# Patient Record
Sex: MALE | Race: ASIAN | NOT HISPANIC OR LATINO | Employment: FULL TIME | URBAN - METROPOLITAN AREA
[De-identification: names, ages, dates, MRNs, and addresses within clinical notes are randomized per-mention and may not be internally consistent; named-entity substitution may affect disease eponyms.]

---

## 2017-03-17 ENCOUNTER — ALLSCRIPTS OFFICE VISIT (OUTPATIENT)
Dept: OTHER | Facility: OTHER | Age: 52
End: 2017-03-17

## 2017-03-17 ENCOUNTER — TRANSCRIBE ORDERS (OUTPATIENT)
Dept: ADMINISTRATIVE | Facility: HOSPITAL | Age: 52
End: 2017-03-17

## 2017-03-17 DIAGNOSIS — K50.919 CROHN'S DISEASE WITH COMPLICATION, UNSPECIFIED GASTROINTESTINAL TRACT LOCATION (HCC): Primary | ICD-10-CM

## 2017-03-17 DIAGNOSIS — R10.13 DYSPEPSIA: ICD-10-CM

## 2017-03-17 DIAGNOSIS — K85.90 ACUTE PANCREATITIS WITHOUT INFECTION OR NECROSIS: ICD-10-CM

## 2017-03-17 DIAGNOSIS — R10.13 ABDOMINAL PAIN, EPIGASTRIC: ICD-10-CM

## 2017-03-17 DIAGNOSIS — R10.13 EPIGASTRIC PAIN: ICD-10-CM

## 2017-03-18 ENCOUNTER — APPOINTMENT (OUTPATIENT)
Dept: LAB | Facility: HOSPITAL | Age: 52
End: 2017-03-18
Attending: INTERNAL MEDICINE
Payer: COMMERCIAL

## 2017-03-18 DIAGNOSIS — K50.919 CROHN'S DISEASE WITH COMPLICATION, UNSPECIFIED GASTROINTESTINAL TRACT LOCATION (HCC): ICD-10-CM

## 2017-03-18 DIAGNOSIS — R10.13 ABDOMINAL PAIN, EPIGASTRIC: ICD-10-CM

## 2017-03-18 DIAGNOSIS — K85.90 ACUTE PANCREATITIS WITHOUT INFECTION OR NECROSIS: ICD-10-CM

## 2017-03-18 DIAGNOSIS — R10.13 EPIGASTRIC PAIN: ICD-10-CM

## 2017-03-18 LAB
ALBUMIN SERPL BCP-MCNC: 4.2 G/DL (ref 3.5–5)
ALP SERPL-CCNC: 83 U/L (ref 46–116)
ALT SERPL W P-5'-P-CCNC: 40 U/L (ref 12–78)
ANION GAP SERPL CALCULATED.3IONS-SCNC: 7 MMOL/L (ref 4–13)
AST SERPL W P-5'-P-CCNC: 19 U/L (ref 5–45)
BILIRUB SERPL-MCNC: 0.45 MG/DL (ref 0.2–1)
BUN SERPL-MCNC: 9 MG/DL (ref 5–25)
CALCIUM SERPL-MCNC: 9.5 MG/DL (ref 8.3–10.1)
CHLORIDE SERPL-SCNC: 97 MMOL/L (ref 100–108)
CO2 SERPL-SCNC: 29 MMOL/L (ref 21–32)
CREAT SERPL-MCNC: 0.91 MG/DL (ref 0.6–1.3)
GFR SERPL CREATININE-BSD FRML MDRD: >60 ML/MIN/1.73SQ M
GLUCOSE P FAST SERPL-MCNC: 138 MG/DL (ref 65–99)
IGA SERPL-MCNC: 186 MG/DL (ref 70–400)
LIPASE SERPL-CCNC: 68 U/L (ref 73–393)
POTASSIUM SERPL-SCNC: 4.5 MMOL/L (ref 3.5–5.3)
PROT SERPL-MCNC: 8 G/DL (ref 6.4–8.2)
SODIUM SERPL-SCNC: 133 MMOL/L (ref 136–145)
TRIGL SERPL-MCNC: 258 MG/DL

## 2017-03-18 PROCEDURE — 82787 IGG 1 2 3 OR 4 EACH: CPT

## 2017-03-18 PROCEDURE — 83516 IMMUNOASSAY NONANTIBODY: CPT

## 2017-03-18 PROCEDURE — 84478 ASSAY OF TRIGLYCERIDES: CPT

## 2017-03-18 PROCEDURE — 82784 ASSAY IGA/IGD/IGG/IGM EACH: CPT

## 2017-03-18 PROCEDURE — 80053 COMPREHEN METABOLIC PANEL: CPT

## 2017-03-18 PROCEDURE — 36415 COLL VENOUS BLD VENIPUNCTURE: CPT

## 2017-03-18 PROCEDURE — 83690 ASSAY OF LIPASE: CPT

## 2017-03-20 LAB
IGG SERPL-MCNC: 1023 MG/DL (ref 700–1600)
IGG1 SER-MCNC: 454 MG/DL (ref 422–1292)
IGG2 SER-MCNC: 449 MG/DL (ref 117–747)
IGG3 SER-MCNC: 62 MG/DL (ref 41–129)
IGG4 SER-MCNC: 315 MG/DL (ref 1–291)
TTG IGA SER-ACNC: <2 U/ML (ref 0–3)

## 2017-03-30 ENCOUNTER — HOSPITAL ENCOUNTER (OUTPATIENT)
Dept: RADIOLOGY | Facility: HOSPITAL | Age: 52
Discharge: HOME | End: 2017-03-30
Attending: INTERNAL MEDICINE
Payer: COMMERCIAL

## 2017-03-30 DIAGNOSIS — R10.13 DYSPEPSIA: ICD-10-CM

## 2017-03-30 PROCEDURE — A9585 GADOBUTROL INJECTION: HCPCS | Performed by: INTERNAL MEDICINE

## 2017-03-30 PROCEDURE — 74183 MRI ABD W/O CNTR FLWD CNTR: CPT

## 2017-03-30 RX ADMIN — GADOBUTROL 7 ML: 604.72 INJECTION INTRAVENOUS at 15:58

## 2017-04-06 ENCOUNTER — GENERIC CONVERSION - ENCOUNTER (OUTPATIENT)
Dept: OTHER | Facility: OTHER | Age: 52
End: 2017-04-06

## 2017-05-25 RX ORDER — SIMVASTATIN 20 MG
20 TABLET ORAL
COMMUNITY
End: 2021-03-19

## 2017-05-25 RX ORDER — FOLIC ACID 1 MG/1
1 TABLET ORAL DAILY
COMMUNITY

## 2017-05-25 RX ORDER — MULTIVIT WITH MINERALS/LUTEIN
1000 TABLET ORAL DAILY
COMMUNITY

## 2017-05-25 RX ORDER — DIPHENOXYLATE HYDROCHLORIDE AND ATROPINE SULFATE 2.5; .025 MG/1; MG/1
1 TABLET ORAL DAILY
COMMUNITY

## 2017-05-25 RX ORDER — BRIMONIDINE TARTRATE 2 MG/ML
3 SOLUTION/ DROPS OPHTHALMIC DAILY
COMMUNITY
End: 2020-12-28

## 2017-05-25 RX ORDER — GLIMEPIRIDE 1 MG/1
2 TABLET ORAL DAILY
COMMUNITY
End: 2020-07-09

## 2017-05-25 RX ORDER — LATANOPROST 50 UG/ML
1 SOLUTION/ DROPS OPHTHALMIC
COMMUNITY

## 2017-05-25 RX ORDER — FAMOTIDINE 20 MG
1 TABLET ORAL 2 TIMES DAILY
COMMUNITY

## 2017-05-25 RX ORDER — LISINOPRIL 10 MG/1
10 TABLET ORAL DAILY
COMMUNITY
End: 2020-12-15 | Stop reason: ALTCHOICE

## 2017-05-26 ENCOUNTER — HOSPITAL ENCOUNTER (OUTPATIENT)
Facility: HOSPITAL | Age: 52
Setting detail: OUTPATIENT SURGERY
Discharge: HOME | End: 2017-05-26
Attending: INTERNAL MEDICINE | Admitting: INTERNAL MEDICINE
Payer: COMMERCIAL

## 2017-05-26 ENCOUNTER — TRANSCRIBE ORDERS (OUTPATIENT)
Dept: LAB | Facility: HOSPITAL | Age: 52
End: 2017-05-26

## 2017-05-26 ENCOUNTER — ANESTHESIA (OUTPATIENT)
Dept: GASTROENTEROLOGY | Facility: HOSPITAL | Age: 52
End: 2017-05-26
Payer: COMMERCIAL

## 2017-05-26 ENCOUNTER — ANESTHESIA EVENT (OUTPATIENT)
Dept: GASTROENTEROLOGY | Facility: HOSPITAL | Age: 52
End: 2017-05-26
Payer: COMMERCIAL

## 2017-05-26 ENCOUNTER — GENERIC CONVERSION - ENCOUNTER (OUTPATIENT)
Dept: OTHER | Facility: OTHER | Age: 52
End: 2017-05-26

## 2017-05-26 VITALS
BODY MASS INDEX: 24.34 KG/M2 | HEIGHT: 70 IN | TEMPERATURE: 98.4 F | WEIGHT: 170 LBS | SYSTOLIC BLOOD PRESSURE: 118 MMHG | DIASTOLIC BLOOD PRESSURE: 71 MMHG | OXYGEN SATURATION: 98 % | RESPIRATION RATE: 20 BRPM | HEART RATE: 82 BPM

## 2017-05-26 DIAGNOSIS — Z12.11 ENCOUNTER FOR SCREENING FOR MALIGNANT NEOPLASM OF COLON: ICD-10-CM

## 2017-05-26 DIAGNOSIS — K29.80 DUODENITIS WITHOUT BLEEDING: ICD-10-CM

## 2017-05-26 DIAGNOSIS — R10.13 EPIGASTRIC PAIN: ICD-10-CM

## 2017-05-26 LAB — GLUCOSE SERPL-MCNC: 159 MG/DL (ref 65–140)

## 2017-05-26 PROCEDURE — 88305 TISSUE EXAM BY PATHOLOGIST: CPT | Performed by: INTERNAL MEDICINE

## 2017-05-26 PROCEDURE — 88342 IMHCHEM/IMCYTCHM 1ST ANTB: CPT | Performed by: INTERNAL MEDICINE

## 2017-05-26 PROCEDURE — 82948 REAGENT STRIP/BLOOD GLUCOSE: CPT

## 2017-05-26 RX ORDER — SODIUM CHLORIDE 9 MG/ML
100 INJECTION, SOLUTION INTRAVENOUS CONTINUOUS
Status: DISCONTINUED | OUTPATIENT
Start: 2017-05-26 | End: 2017-05-26 | Stop reason: HOSPADM

## 2017-05-26 RX ORDER — PROPOFOL 10 MG/ML
INJECTION, EMULSION INTRAVENOUS AS NEEDED
Status: DISCONTINUED | OUTPATIENT
Start: 2017-05-26 | End: 2017-05-26 | Stop reason: SURG

## 2017-05-26 RX ADMIN — SODIUM CHLORIDE 100 ML/HR: 0.9 INJECTION, SOLUTION INTRAVENOUS at 09:27

## 2017-05-26 RX ADMIN — PROPOFOL 50 MG: 10 INJECTION, EMULSION INTRAVENOUS at 10:15

## 2017-05-26 RX ADMIN — PROPOFOL 50 MG: 10 INJECTION, EMULSION INTRAVENOUS at 10:10

## 2017-05-26 RX ADMIN — PROPOFOL 150 MG: 10 INJECTION, EMULSION INTRAVENOUS at 09:44

## 2017-05-26 RX ADMIN — PROPOFOL 50 MG: 10 INJECTION, EMULSION INTRAVENOUS at 09:55

## 2017-05-26 RX ADMIN — PROPOFOL 50 MG: 10 INJECTION, EMULSION INTRAVENOUS at 09:50

## 2017-05-26 RX ADMIN — PROPOFOL 50 MG: 10 INJECTION, EMULSION INTRAVENOUS at 10:20

## 2017-05-30 ENCOUNTER — APPOINTMENT (OUTPATIENT)
Dept: LAB | Facility: HOSPITAL | Age: 52
End: 2017-05-30
Attending: INTERNAL MEDICINE
Payer: COMMERCIAL

## 2017-05-30 ENCOUNTER — TRANSCRIBE ORDERS (OUTPATIENT)
Dept: LAB | Facility: HOSPITAL | Age: 52
End: 2017-05-30

## 2017-05-30 DIAGNOSIS — K29.80 DUODENITIS WITHOUT BLEEDING: ICD-10-CM

## 2017-05-30 PROCEDURE — 36415 COLL VENOUS BLD VENIPUNCTURE: CPT

## 2017-05-30 PROCEDURE — 82941 ASSAY OF GASTRIN: CPT

## 2017-06-01 LAB — GASTRIN SERPL-MCNC: 27 PG/ML (ref 0–115)

## 2017-07-21 ENCOUNTER — ALLSCRIPTS OFFICE VISIT (OUTPATIENT)
Dept: OTHER | Facility: OTHER | Age: 52
End: 2017-07-21

## 2018-01-15 VITALS
HEART RATE: 86 BPM | DIASTOLIC BLOOD PRESSURE: 74 MMHG | SYSTOLIC BLOOD PRESSURE: 132 MMHG | WEIGHT: 170 LBS | OXYGEN SATURATION: 97 % | TEMPERATURE: 97.2 F

## 2018-01-15 VITALS
HEART RATE: 96 BPM | TEMPERATURE: 96.2 F | WEIGHT: 168 LBS | OXYGEN SATURATION: 100 % | BODY MASS INDEX: 24.05 KG/M2 | SYSTOLIC BLOOD PRESSURE: 116 MMHG | DIASTOLIC BLOOD PRESSURE: 72 MMHG | HEIGHT: 70 IN

## 2018-01-15 NOTE — RESULT NOTES
Verified Results  * MRI ABDOMEN W WO CONTRAST AND MRCP [id number] 02:31PM Bakari Ford     Test Name Result Flag Reference   MRI ABDOMEN W WO CONTRAST AND MRCP (Report)     This is a summary report  The complete report is available in the patient's medical record  If you cannot access the medical record, please contact the sending organization for a detailed fax or copy  MRI OF THE ABDOMEN WITH AND WITHOUT CONTRAST WITH MRCP     INDICATION: Dyspepsia  COMPARISON: None     TECHNIQUE: The following pulse sequences were obtained on a 1 5 T scanner: Coronal and axial T2 with TE of 90 and 180 respectively, axial T2 with fat saturation, axial FIESTA fat-sat, axial T1-weighted in-and-out-of phase, axial DWI/ADC, pre-contrast    axial T1 with fat saturation, post-contrast dynamic axial T1 with fat saturation at 20, 70, and 180 seconds, followed by coronal and 7 minute delayed axial T1 with fat saturation  3D MRCP images were obtained with radial thick slabs and projections  3D    rendering was performed from the acquisition scanner  Pre- and postcontrast subtraction images were also obtained  IV Contrast: 7 mL of gadobutrol injection (MULTI-DOSE)       FINDINGS:     LIVER:    General: Normal in size and contour  No loss of signal on out-of-phase images to suggest hepatic steatosis  Lesions: No cystic or solid lesions identified  No areas of abnormal arterial enhancement  Vasculature: Portal and hepatic veins patent without evidence of thrombosis  BILIARY TREE:    No intra-hepatic biliary ductal dilatation  Common bile duct is normal in caliber  The distal cystic duct has a fairly long parallel course with the common hepatic duct at least 3 cm in length  No evidence of bile duct stricture or choledocholithiasis  No mass identified  The pancreatic duct is normal caliber  There is no evidence of pancreatic divisum  GALLBLADDER: Contracted  No gallstones or pericholecystic inflammation  PANCREAS: Moderately severe atrophy  ADRENAL GLANDS: Normal      SPLEEN: Normal size  A 6 mm arterial enhancing nodular focus seen on series 12 image 45 is favored to represent heterogeneous enhancement on arterial phase imaging  This is not seen on additional postcontrast sequences and is without T1 or T2-weighted   signal abnormality and is devoid of restricted diffusion  KIDNEYS: Normal      ABDOMINAL CAVITY: No lymphadenopathy or mass  No ascites  BOWEL: Unremarkable MRI appearance  OSSEOUS STRUCTURES: No osseous destruction  EXTRAHEPATIC VASCULAR STRUCTURES: Visualized vasculature is normal      ABDOMINAL WALL: Normal      LOWER CHEST: Unremarkable MRI appearance  IMPRESSION:     Moderately severe pancreatic atrophy without acute finding  Contracted gallbladder without stones or evidence of cholecystitis  Unremarkable MRCP         Workstation performed: [id number]     Signed by:   Dru Panda DO   3/31/17

## 2018-05-26 DIAGNOSIS — K21.9 GASTROESOPHAGEAL REFLUX DISEASE WITHOUT ESOPHAGITIS: Primary | ICD-10-CM

## 2018-05-30 RX ORDER — OMEPRAZOLE 20 MG/1
CAPSULE, DELAYED RELEASE ORAL
Qty: 90 CAPSULE | Refills: 3 | Status: SHIPPED | OUTPATIENT
Start: 2018-05-30 | End: 2020-07-09

## 2018-09-15 ENCOUNTER — TRANSCRIBE ORDERS (OUTPATIENT)
Dept: LAB | Facility: HOSPITAL | Age: 53
End: 2018-09-15

## 2018-09-15 ENCOUNTER — APPOINTMENT (OUTPATIENT)
Dept: LAB | Facility: HOSPITAL | Age: 53
End: 2018-09-15
Payer: COMMERCIAL

## 2018-09-15 DIAGNOSIS — E78.2 MIXED HYPERLIPIDEMIA: ICD-10-CM

## 2018-09-15 DIAGNOSIS — R73.01 IMPAIRED FASTING GLUCOSE: ICD-10-CM

## 2018-09-15 DIAGNOSIS — E78.2 MIXED HYPERLIPIDEMIA: Primary | ICD-10-CM

## 2018-09-15 LAB
ALBUMIN SERPL BCP-MCNC: 3.9 G/DL (ref 3.5–5)
ALP SERPL-CCNC: 86 U/L (ref 46–116)
ALT SERPL W P-5'-P-CCNC: 25 U/L (ref 12–78)
ANION GAP SERPL CALCULATED.3IONS-SCNC: 5 MMOL/L (ref 4–13)
AST SERPL W P-5'-P-CCNC: 13 U/L (ref 5–45)
BILIRUB SERPL-MCNC: 0.53 MG/DL (ref 0.2–1)
BUN SERPL-MCNC: 14 MG/DL (ref 5–25)
CALCIUM SERPL-MCNC: 9.4 MG/DL (ref 8.3–10.1)
CHLORIDE SERPL-SCNC: 98 MMOL/L (ref 100–108)
CHOLEST SERPL-MCNC: 153 MG/DL (ref 50–200)
CO2 SERPL-SCNC: 29 MMOL/L (ref 21–32)
CREAT SERPL-MCNC: 0.94 MG/DL (ref 0.6–1.3)
EST. AVERAGE GLUCOSE BLD GHB EST-MCNC: 151 MG/DL
GFR SERPL CREATININE-BSD FRML MDRD: 92 ML/MIN/1.73SQ M
GLUCOSE P FAST SERPL-MCNC: 98 MG/DL (ref 65–99)
HBA1C MFR BLD: 6.9 % (ref 4.2–6.3)
HDLC SERPL-MCNC: 48 MG/DL (ref 40–60)
LDLC SERPL CALC-MCNC: 66 MG/DL (ref 0–100)
NONHDLC SERPL-MCNC: 105 MG/DL
POTASSIUM SERPL-SCNC: 4.9 MMOL/L (ref 3.5–5.3)
PROT SERPL-MCNC: 7.6 G/DL (ref 6.4–8.2)
SODIUM SERPL-SCNC: 132 MMOL/L (ref 136–145)
TRIGL SERPL-MCNC: 196 MG/DL

## 2018-09-15 PROCEDURE — 36415 COLL VENOUS BLD VENIPUNCTURE: CPT

## 2018-09-15 PROCEDURE — 80053 COMPREHEN METABOLIC PANEL: CPT

## 2018-09-15 PROCEDURE — 83036 HEMOGLOBIN GLYCOSYLATED A1C: CPT

## 2018-09-15 PROCEDURE — 80061 LIPID PANEL: CPT

## 2018-10-05 ENCOUNTER — TELEPHONE (OUTPATIENT)
Dept: GASTROENTEROLOGY | Facility: MEDICAL CENTER | Age: 53
End: 2018-10-05

## 2018-11-28 ENCOUNTER — OFFICE VISIT (OUTPATIENT)
Dept: GASTROENTEROLOGY | Facility: MEDICAL CENTER | Age: 53
End: 2018-11-28
Payer: COMMERCIAL

## 2018-11-28 VITALS
BODY MASS INDEX: 24.34 KG/M2 | WEIGHT: 170 LBS | DIASTOLIC BLOOD PRESSURE: 78 MMHG | HEIGHT: 70 IN | SYSTOLIC BLOOD PRESSURE: 128 MMHG | TEMPERATURE: 97.8 F | HEART RATE: 80 BPM

## 2018-11-28 DIAGNOSIS — K29.80 DUODENITIS: ICD-10-CM

## 2018-11-28 DIAGNOSIS — D89.89 IGG4 RELATED DISEASE (HCC): ICD-10-CM

## 2018-11-28 DIAGNOSIS — K85.00 IDIOPATHIC ACUTE PANCREATITIS WITHOUT INFECTION OR NECROSIS: ICD-10-CM

## 2018-11-28 DIAGNOSIS — K21.9 GASTROESOPHAGEAL REFLUX DISEASE WITHOUT ESOPHAGITIS: Primary | ICD-10-CM

## 2018-11-28 PROCEDURE — 99214 OFFICE O/P EST MOD 30 MIN: CPT | Performed by: INTERNAL MEDICINE

## 2018-11-28 RX ORDER — FAMOTIDINE 40 MG/1
40 TABLET, FILM COATED ORAL DAILY
Qty: 30 TABLET | Refills: 6 | Status: SHIPPED | OUTPATIENT
Start: 2018-11-28 | End: 2019-06-08 | Stop reason: SDUPTHER

## 2018-11-28 NOTE — LETTER
December 3, 2018     [place], [address] [address]    Patient: Nancy Stevenson   YOB: 1965   Date of Visit: 11/28/2018       Dear Dr Karrie Greenberg Recipients: Thank you for referring Jossy Weber to me for evaluation  Below are my notes for this consultation  If you have questions, please do not hesitate to call me  I look forward to following your patient along with you  Sincerely,        Demetrius Bustamante MD        CC: No Recipients  Demetrius Bustamante MD  12/3/2018  2:31 PM  Sign at close encounter  [facility] - Outpatient Follow-up Note  Nancy Stevenson 52 y o  male MRN: [id number]  Encounter: [id number]          ASSESSMENT AND PLAN:      1  Gastroesophageal reflux disease without esophagitis  - famotidine (PEPCID) 40 MG tablet; Take 1 tablet (40 mg total) by mouth daily for 30 days  Dispense: 30 tablet; Refill: 6  - Case request operating room: ESOPHAGOGASTRODUODENOSCOPY (EGD); Standing  - Case request operating room: ESOPHAGOGASTRODUODENOSCOPY (EGD)    2  Duodenitis    He presents here for follow-up of duodenitis and GERD  Symptoms of GERD have been overall well controlled he is currently taking PPI therapy after extensive discussion regarding side effects of long-term PPI use we have decided to wean off PPI therapy in use H2 blocker in the long-term  He does have history of duodenitis this may be secondary to previous history of pancreatitis  We prior to his endoscopy he did have epigastric discomfort concerning for possible pancreatitis at that time  Based on previous EGD in normal gastrin level will repeat endoscopy to make sure his duodenitis has resolved as this can be to duodenal stricture  He does have previous history of pancreatitis with high IgG 4 levels 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 currently he is taking methotrexate and Rituxan for this   Previously he was on infliximab but due to lack of efficacy he was switched over to Rituxan last year  He has received 2 doses and he is doing fairly well with this  He will be getting Rituxan every 6 months based on his symptoms  His visual symptoms have also improved since he started Rituxan  Will plan for EGD evaluation to confirm resolution of duodenitis  He will remain on H2 blockers as needed for GERD symptoms  Screening colonoscopy be due in 2020    ______________________________________________________________________    SUBJECTIVE:      He presents here for follow-up  He has history of sarcoid, IgG 4 related autoimmune disorders  He likely has pancreatitis secondary to IgG 4 related pathology fortunately has not had episode of pancreatitis was diagnosed  He did have abdominal discomfort in August of 2018 which improved after receive another dose of Rituxan  Currently doing well overall without any acute distress  EGD performed in May of 2017 showed severe duodenitis in the 2nd portion of the duodenum with normal gastrin levels  He has not had any abdominal discomfort at this time and denies any NSAID use  REVIEW OF SYSTEMS IS OTHERWISE NEGATIVE  Historical Information   Past Medical History:   Diagnosis Date    Diabetes mellitus ([address] )     Hyperlipidemia     Hypertension     Sarcoid      Past Surgical History:   Procedure Laterality Date    LYMPH NODE BIOPSY      OPTIC NERVE DECOMPRESSION      [place] ESOPHAGOGASTRODUODENOSCOPY TRANSORAL DIAGNOSTIC N/A 5/26/2017    Procedure: EGD AND COLONOSCOPY;  Surgeon: Connor Guardado MD;  Location[facility]; Service: Gastroenterology     Social History   History   Alcohol Use    Yes     Comment: occasionally     History   Drug Use No     History   Smoking Status    Former Smoker   Smokeless Tobacco    Not on file     No family history on file      Meds/Allergies       Current Outpatient Prescriptions:     Ascorbic Acid (VITAMIN C) 1000 MG tablet    b complex vitamins tablet    brimonidine tartrate 0 2 % ophthalmic solution    Cyanocobalamin (VITAMIN B12 PO)    famotidine (PEPCID) 40 MG tablet    FLUTICASONE PROPIONATE, INHAL, IN    folic acid (FOLVITE) 1 mg tablet    glimepiride (AMARYL) 1 mg tablet    latanoprost (XALATAN) 0 005 % ophthalmic solution    lisinopril (ZESTRIL) 10 mg tablet    METFORMIN HCL PO    Methotrexate Sodium (METHOTREXATE PO)    Multiple Vitamins-Minerals (PRESERVISION AREDS PO)    multivitamin (THERAGRAN) TABS    Omega-3 Fatty Acids (FISH OIL PO)    omeprazole (PriLOSEC) 20 mg delayed release capsule    simvastatin (ZOCOR) 20 mg tablet    Vitamin D, Cholecalciferol, 1000 units CAPS    Allergies   Allergen Reactions    Ceclor [Cefaclor]            Objective     Blood pressure 128/78, pulse 80, temperature 97 8 °F (36 6 °C), temperature source Tympanic, height 5' 10" (1 778 m), weight 77 1 kg (170 lb)  Body mass index is 24 39 kg/m²  PHYSICAL EXAM:      General Appearance:   Alert, cooperative, no distress   HEENT:   Normocephalic, atraumatic, anicteric      Neck:  Supple, symmetrical, trachea midline   Lungs:   Clear to auscultation bilaterally; no rales, rhonchi or wheezing; respirations unlabored    Heart[de-identified]   Regular rate and rhythm; no murmur, rub, or gallop  Abdomen:   Soft, non-tender, non-distended; normal bowel sounds; no masses, no organomegaly    Genitalia:   Deferred    Rectal:   Deferred    Extremities:  No cyanosis, clubbing or edema    Pulses:  2+ and symmetric    Skin:  No jaundice, rashes, or lesions    Lymph nodes:  No palpable cervical lymphadenopathy        Lab Results:   No visits with results within 1 Day(s) from this visit     Latest known visit with results is:   Appointment on 09/15/2018   Component Date Value    Cholesterol 09/15/2018 153     Triglycerides 09/15/2018 196*    HDL, Direct 09/15/2018 48     LDL Calculated 09/15/2018 66     Non-HDL-Chol (CHOL-HDL) 09/15/2018 105          Radiology Results:   No results found

## 2018-11-28 NOTE — PATIENT INSTRUCTIONS
EGD scheduled on 2/15/2019 with Dr Smita Sullivan at [facility]  Instructions given to patient   PT is diabetic

## 2018-12-03 PROBLEM — K85.00 IDIOPATHIC ACUTE PANCREATITIS WITHOUT INFECTION OR NECROSIS: Status: ACTIVE | Noted: 2018-12-03

## 2018-12-03 PROBLEM — D89.84 IGG4 RELATED DISEASE: Status: ACTIVE | Noted: 2018-12-03

## 2018-12-03 PROBLEM — D89.89 IGG4 RELATED DISEASE (HCC): Status: ACTIVE | Noted: 2018-12-03

## 2018-12-03 PROBLEM — K29.80 DUODENITIS: Status: ACTIVE | Noted: 2018-12-03

## 2018-12-03 NOTE — PROGRESS NOTES
[facility] - Outpatient Follow-up Note  Ned Aschoff 52 y o  male MRN: [id number]  Encounter: [id number]          ASSESSMENT AND PLAN:      1  Gastroesophageal reflux disease without esophagitis  - famotidine (PEPCID) 40 MG tablet; Take 1 tablet (40 mg total) by mouth daily for 30 days  Dispense: 30 tablet; Refill: 6  - Case request operating room: ESOPHAGOGASTRODUODENOSCOPY (EGD); Standing  - Case request operating room: ESOPHAGOGASTRODUODENOSCOPY (EGD)    2  Duodenitis    He presents here for follow-up of duodenitis and GERD  Symptoms of GERD have been overall well controlled he is currently taking PPI therapy after extensive discussion regarding side effects of long-term PPI use we have decided to wean off PPI therapy in use H2 blocker in the long-term  He does have history of duodenitis this may be secondary to previous history of pancreatitis  We prior to his endoscopy he did have epigastric discomfort concerning for possible pancreatitis at that time  Based on previous EGD in normal gastrin level will repeat endoscopy to make sure his duodenitis has resolved as this can be to duodenal stricture  He does have previous history of pancreatitis with high IgG 4 levels 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 currently he is taking methotrexate and Rituxan for this  Previously he was on infliximab but due to lack of efficacy he was switched over to Rituxan last year  He has received 2 doses and he is doing fairly well with this  He will be getting Rituxan every 6 months based on his symptoms  His visual symptoms have also improved since he started Rituxan  Will plan for EGD evaluation to confirm resolution of duodenitis  He will remain on H2 blockers as needed for GERD symptoms  Screening colonoscopy be due in 2020  ______________________________________________________________________    SUBJECTIVE:      He presents here for follow-up  He has history of sarcoid, IgG 4 related autoimmune disorders  He likely has pancreatitis secondary to IgG 4 related pathology fortunately has not had episode of pancreatitis was diagnosed  He did have abdominal discomfort in August of 2018 which improved after receive another dose of Rituxan  Currently doing well overall without any acute distress  EGD performed in May of 2017 showed severe duodenitis in the 2nd portion of the duodenum with normal gastrin levels  He has not had any abdominal discomfort at this time and denies any NSAID use  REVIEW OF SYSTEMS IS OTHERWISE NEGATIVE  Historical Information   Past Medical History:   Diagnosis Date    Diabetes mellitus [address] [address] )     Hyperlipidemia     Hypertension     Sarcoid      Past Surgical History:   Procedure Laterality Date    LYMPH NODE BIOPSY      OPTIC NERVE DECOMPRESSION      [place] ESOPHAGOGASTRODUODENOSCOPY TRANSORAL DIAGNOSTIC N/A 5/26/2017    Procedure: EGD AND COLONOSCOPY;  Surgeon: Tommie Holstein, MD;  Location: [facility]; Service: Gastroenterology     Social History   History   Alcohol Use    Yes     Comment: occasionally     History   Drug Use No     History   Smoking Status    Former Smoker   Smokeless Tobacco    Not on file     No family history on file      Meds/Allergies       Current Outpatient Prescriptions:     Ascorbic Acid (VITAMIN C) 1000 MG tablet    b complex vitamins tablet    brimonidine tartrate 0 2 % ophthalmic solution    Cyanocobalamin (VITAMIN B12 PO)    famotidine (PEPCID) 40 MG tablet    FLUTICASONE PROPIONATE, INHAL, IN    folic acid (FOLVITE) 1 mg tablet    glimepiride (AMARYL) 1 mg tablet    latanoprost (XALATAN) 0 005 % ophthalmic solution    lisinopril (ZESTRIL) 10 mg tablet    METFORMIN HCL PO    Methotrexate Sodium (METHOTREXATE PO)    Multiple Vitamins-Minerals (PRESERVISION AREDS PO)    multivitamin (THERAGRAN) TABS    Omega-3 Fatty Acids (FISH OIL PO)    omeprazole (PriLOSEC) 20 mg delayed release capsule    simvastatin (ZOCOR) 20 mg tablet    Vitamin D, Cholecalciferol, 1000 units CAPS    Allergies   Allergen Reactions    Ceclor [Cefaclor]            Objective     Blood pressure 128/78, pulse 80, temperature 97 8 °F (36 6 °C), temperature source Tympanic, height 5' 10" (1 778 m), weight 77 1 kg (170 lb)  Body mass index is 24 39 kg/m²  PHYSICAL EXAM:      General Appearance:   Alert, cooperative, no distress   HEENT:   Normocephalic, atraumatic, anicteric      Neck:  Supple, symmetrical, trachea midline   Lungs:   Clear to auscultation bilaterally; no rales, rhonchi or wheezing; respirations unlabored    Heart[de-identified]   Regular rate and rhythm; no murmur, rub, or gallop  Abdomen:   Soft, non-tender, non-distended; normal bowel sounds; no masses, no organomegaly    Genitalia:   Deferred    Rectal:   Deferred    Extremities:  No cyanosis, clubbing or edema    Pulses:  2+ and symmetric    Skin:  No jaundice, rashes, or lesions    Lymph nodes:  No palpable cervical lymphadenopathy        Lab Results:   No visits with results within 1 Day(s) from this visit  Latest known visit with results is:   Appointment on 09/15/2018   Component Date Value    Cholesterol 09/15/2018 153     Triglycerides 09/15/2018 196*    HDL, Direct 09/15/2018 48     LDL Calculated 09/15/2018 66     Non-HDL-Chol (CHOL-HDL) 09/15/2018 105          Radiology Results:   No results found

## 2019-01-05 ENCOUNTER — TRANSCRIBE ORDERS (OUTPATIENT)
Dept: LAB | Facility: HOSPITAL | Age: 54
End: 2019-01-05

## 2019-01-05 ENCOUNTER — APPOINTMENT (OUTPATIENT)
Dept: LAB | Facility: HOSPITAL | Age: 54
End: 2019-01-05
Payer: COMMERCIAL

## 2019-01-05 DIAGNOSIS — E78.2 MIXED HYPERLIPIDEMIA: ICD-10-CM

## 2019-01-05 DIAGNOSIS — E11.8 TYPE 2 DIABETES MELLITUS WITH COMPLICATION, UNSPECIFIED WHETHER LONG TERM INSULIN USE: ICD-10-CM

## 2019-01-05 DIAGNOSIS — E11.8 TYPE 2 DIABETES MELLITUS WITH COMPLICATION, UNSPECIFIED WHETHER LONG TERM INSULIN USE: Primary | ICD-10-CM

## 2019-01-05 LAB
ALBUMIN SERPL BCP-MCNC: 3.9 G/DL (ref 3.5–5)
ALP SERPL-CCNC: 79 U/L (ref 46–116)
ALT SERPL W P-5'-P-CCNC: 37 U/L (ref 12–78)
ANION GAP SERPL CALCULATED.3IONS-SCNC: 8 MMOL/L (ref 4–13)
AST SERPL W P-5'-P-CCNC: 14 U/L (ref 5–45)
BILIRUB SERPL-MCNC: 0.33 MG/DL (ref 0.2–1)
BUN SERPL-MCNC: 12 MG/DL (ref 5–25)
CALCIUM SERPL-MCNC: 8.7 MG/DL (ref 8.3–10.1)
CHLORIDE SERPL-SCNC: 102 MMOL/L (ref 100–108)
CHOLEST SERPL-MCNC: 186 MG/DL (ref 50–200)
CO2 SERPL-SCNC: 25 MMOL/L (ref 21–32)
CREAT SERPL-MCNC: 0.86 MG/DL (ref 0.6–1.3)
EST. AVERAGE GLUCOSE BLD GHB EST-MCNC: 174 MG/DL
GFR SERPL CREATININE-BSD FRML MDRD: 98 ML/MIN/1.73SQ M
GLUCOSE P FAST SERPL-MCNC: 138 MG/DL (ref 65–99)
HBA1C MFR BLD: 7.7 % (ref 4.2–6.3)
HDLC SERPL-MCNC: 46 MG/DL (ref 40–60)
LDLC SERPL CALC-MCNC: 96 MG/DL (ref 0–100)
NONHDLC SERPL-MCNC: 140 MG/DL
POTASSIUM SERPL-SCNC: 4.1 MMOL/L (ref 3.5–5.3)
PROT SERPL-MCNC: 7.2 G/DL (ref 6.4–8.2)
SODIUM SERPL-SCNC: 135 MMOL/L (ref 136–145)
TRIGL SERPL-MCNC: 220 MG/DL

## 2019-01-05 PROCEDURE — 83036 HEMOGLOBIN GLYCOSYLATED A1C: CPT

## 2019-01-05 PROCEDURE — 80061 LIPID PANEL: CPT

## 2019-01-05 PROCEDURE — 36415 COLL VENOUS BLD VENIPUNCTURE: CPT

## 2019-01-05 PROCEDURE — 80053 COMPREHEN METABOLIC PANEL: CPT

## 2019-02-04 ENCOUNTER — TELEPHONE (OUTPATIENT)
Dept: GASTROENTEROLOGY | Facility: CLINIC | Age: 54
End: 2019-02-04

## 2019-04-01 ENCOUNTER — ANESTHESIA EVENT (OUTPATIENT)
Dept: GASTROENTEROLOGY | Facility: MEDICAL CENTER | Age: 54
End: 2019-04-01
Payer: COMMERCIAL

## 2019-04-02 ENCOUNTER — ANESTHESIA (OUTPATIENT)
Dept: GASTROENTEROLOGY | Facility: MEDICAL CENTER | Age: 54
End: 2019-04-02
Payer: COMMERCIAL

## 2019-04-02 ENCOUNTER — HOSPITAL ENCOUNTER (OUTPATIENT)
Facility: MEDICAL CENTER | Age: 54
Setting detail: OUTPATIENT SURGERY
Discharge: HOME | End: 2019-04-02
Attending: INTERNAL MEDICINE | Admitting: INTERNAL MEDICINE
Payer: COMMERCIAL

## 2019-04-02 VITALS
DIASTOLIC BLOOD PRESSURE: 87 MMHG | HEART RATE: 82 BPM | HEIGHT: 70 IN | TEMPERATURE: 97 F | OXYGEN SATURATION: 98 % | WEIGHT: 165 LBS | BODY MASS INDEX: 23.62 KG/M2 | RESPIRATION RATE: 16 BRPM | SYSTOLIC BLOOD PRESSURE: 143 MMHG

## 2019-04-02 PROCEDURE — 43235 EGD DIAGNOSTIC BRUSH WASH: CPT | Performed by: INTERNAL MEDICINE

## 2019-04-02 RX ORDER — PROPOFOL 10 MG/ML
INJECTION, EMULSION INTRAVENOUS AS NEEDED
Status: DISCONTINUED | OUTPATIENT
Start: 2019-04-02 | End: 2019-04-02 | Stop reason: SURG

## 2019-04-02 RX ORDER — SODIUM CHLORIDE 9 MG/ML
125 INJECTION, SOLUTION INTRAVENOUS CONTINUOUS
Status: DISCONTINUED | OUTPATIENT
Start: 2019-04-02 | End: 2019-04-02 | Stop reason: HOSPADM

## 2019-04-02 RX ADMIN — SODIUM CHLORIDE 125 ML/HR: 0.9 INJECTION, SOLUTION INTRAVENOUS at 07:38

## 2019-04-02 RX ADMIN — PROPOFOL 120 MG: 10 INJECTION, EMULSION INTRAVENOUS at 07:59

## 2019-04-12 ENCOUNTER — HOSPITAL ENCOUNTER (EMERGENCY)
Facility: HOSPITAL | Age: 54
Discharge: HOME | End: 2019-04-12
Attending: EMERGENCY MEDICINE | Admitting: EMERGENCY MEDICINE
Payer: COMMERCIAL

## 2019-04-12 VITALS
DIASTOLIC BLOOD PRESSURE: 110 MMHG | TEMPERATURE: 98.9 F | WEIGHT: 165.44 LBS | HEIGHT: 70 IN | HEART RATE: 91 BPM | SYSTOLIC BLOOD PRESSURE: 195 MMHG | BODY MASS INDEX: 23.68 KG/M2 | OXYGEN SATURATION: 99 % | RESPIRATION RATE: 16 BRPM

## 2019-04-12 DIAGNOSIS — M79.601 RIGHT ARM PAIN: Primary | ICD-10-CM

## 2019-04-12 PROCEDURE — 99283 EMERGENCY DEPT VISIT LOW MDM: CPT

## 2019-04-12 PROCEDURE — 99283 EMERGENCY DEPT VISIT LOW MDM: CPT | Performed by: EMERGENCY MEDICINE

## 2019-04-15 ENCOUNTER — TELEPHONE (OUTPATIENT)
Dept: GASTROENTEROLOGY | Facility: AMBULARY SURGERY CENTER | Age: 54
End: 2019-04-15

## 2019-05-14 ENCOUNTER — APPOINTMENT (OUTPATIENT)
Dept: LAB | Facility: HOSPITAL | Age: 54
End: 2019-05-14
Payer: COMMERCIAL

## 2019-05-14 ENCOUNTER — TRANSCRIBE ORDERS (OUTPATIENT)
Dept: LAB | Facility: HOSPITAL | Age: 54
End: 2019-05-14

## 2019-05-14 DIAGNOSIS — E78.00 PURE HYPERCHOLESTEROLEMIA: ICD-10-CM

## 2019-05-14 DIAGNOSIS — E78.00 PURE HYPERCHOLESTEROLEMIA: Primary | ICD-10-CM

## 2019-05-14 DIAGNOSIS — R73.09 IMPAIRED GLUCOSE TOLERANCE TEST: ICD-10-CM

## 2019-05-14 LAB
ALBUMIN SERPL BCP-MCNC: 4 G/DL (ref 3.5–5)
ALP SERPL-CCNC: 83 U/L (ref 46–116)
ALT SERPL W P-5'-P-CCNC: 32 U/L (ref 12–78)
ANION GAP SERPL CALCULATED.3IONS-SCNC: 4 MMOL/L (ref 4–13)
AST SERPL W P-5'-P-CCNC: 13 U/L (ref 5–45)
BILIRUB SERPL-MCNC: 0.38 MG/DL (ref 0.2–1)
BUN SERPL-MCNC: 12 MG/DL (ref 5–25)
CALCIUM SERPL-MCNC: 8.5 MG/DL (ref 8.3–10.1)
CHLORIDE SERPL-SCNC: 99 MMOL/L (ref 100–108)
CHOLEST SERPL-MCNC: 163 MG/DL (ref 50–200)
CO2 SERPL-SCNC: 30 MMOL/L (ref 21–32)
CREAT SERPL-MCNC: 0.89 MG/DL (ref 0.6–1.3)
EST. AVERAGE GLUCOSE BLD GHB EST-MCNC: 174 MG/DL
GFR SERPL CREATININE-BSD FRML MDRD: 96 ML/MIN/1.73SQ M
GLUCOSE P FAST SERPL-MCNC: 138 MG/DL (ref 65–99)
HBA1C MFR BLD: 7.7 % (ref 4.2–6.3)
HDLC SERPL-MCNC: 45 MG/DL (ref 40–60)
LDLC SERPL CALC-MCNC: 77 MG/DL (ref 0–100)
NONHDLC SERPL-MCNC: 118 MG/DL
POTASSIUM SERPL-SCNC: 4.1 MMOL/L (ref 3.5–5.3)
PROT SERPL-MCNC: 7.4 G/DL (ref 6.4–8.2)
SODIUM SERPL-SCNC: 133 MMOL/L (ref 136–145)
TRIGL SERPL-MCNC: 206 MG/DL

## 2019-05-14 PROCEDURE — 36415 COLL VENOUS BLD VENIPUNCTURE: CPT

## 2019-05-14 PROCEDURE — 83036 HEMOGLOBIN GLYCOSYLATED A1C: CPT

## 2019-05-14 PROCEDURE — 80053 COMPREHEN METABOLIC PANEL: CPT

## 2019-05-14 PROCEDURE — 80061 LIPID PANEL: CPT

## 2019-06-08 DIAGNOSIS — K21.9 GASTROESOPHAGEAL REFLUX DISEASE WITHOUT ESOPHAGITIS: ICD-10-CM

## 2019-06-17 RX ORDER — FAMOTIDINE 40 MG/1
TABLET, FILM COATED ORAL
Qty: 30 TABLET | Refills: 6 | Status: SHIPPED | OUTPATIENT
Start: 2019-06-17 | End: 2022-08-09

## 2019-08-29 ENCOUNTER — OFFICE VISIT (OUTPATIENT)
Dept: GASTROENTEROLOGY | Facility: MEDICAL CENTER | Age: 54
End: 2019-08-29
Payer: COMMERCIAL

## 2019-08-29 VITALS
BODY MASS INDEX: 23.48 KG/M2 | DIASTOLIC BLOOD PRESSURE: 80 MMHG | WEIGHT: 164 LBS | SYSTOLIC BLOOD PRESSURE: 120 MMHG | HEART RATE: 76 BPM | HEIGHT: 70 IN | TEMPERATURE: 98.6 F

## 2019-08-29 DIAGNOSIS — D89.89 IGG4 RELATED DISEASE (HCC): Primary | ICD-10-CM

## 2019-08-29 DIAGNOSIS — K29.80 DUODENITIS: ICD-10-CM

## 2019-08-29 DIAGNOSIS — K85.00 IDIOPATHIC ACUTE PANCREATITIS WITHOUT INFECTION OR NECROSIS: ICD-10-CM

## 2019-08-29 PROCEDURE — 99213 OFFICE O/P EST LOW 20 MIN: CPT | Performed by: INTERNAL MEDICINE

## 2019-09-04 NOTE — PROGRESS NOTES
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 disorders as well as GI involvement  He is currently on Rituxan with good response  Rituxan is being dosed with IgG 4 levels and total B-cell CD 19+ levels  He is following up with [address] [place] [address] for this  Major indicator for uncontrolled disease is related to discomfort and periorbital region as his optic nerves are also involved  Currently he has no acute issues or distress  He has pancreatitis related to IgG 4 related disease in the past   He has had multiple episodes but has not had an episode over the last year  This is another indication is disease is better controlled  Pancreatitis led to duodenitis in the past but recent EGD with biopsy showed resolution of severe duodenitis  He will follow up with us as needed  His repeat colonoscopy will be planned in 3 years since removed polyp of the colon was serrated sessile adenoma     ______________________________________________________________________    SUBJECTIVE:      He denies any acute distress at this time  He has not had any episodes related to pancreatitis over the last year  Denies any nausea/vomiting/fever/chills  No acute distress at this time  REVIEW OF SYSTEMS IS OTHERWISE NEGATIVE        Historical Information   Past Medical History:   Diagnosis Date    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[facility] )     Diabetes mellitus ([facility] )     blood sugar 121 @ 0600    GERD (gastroesophageal reflux disease)     Hyperlipidemia     Hypertension     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[address] [place] [address] )     Sarcoid      Past Surgical History:   Procedure Laterality Date    COLONOSCOPY      ESOPHAGOGASTRODUODENOSCOPY N/A 4/2/2019 Procedure: ESOPHAGOGASTRODUODENOSCOPY (EGD); Surgeon: Rozina Brooks MD;  Location: [facility]; Service: Gastroenterology    LYMPH NODE BIOPSY      OPTIC NERVE DECOMPRESSION      [place] ESOPHAGOGASTRODUODENOSCOPY TRANSORAL DIAGNOSTIC N/A 5/26/2017    Procedure: EGD AND COLONOSCOPY;  Surgeon: Rozina Brooks MD;  Location:  GI LAB; Service: Gastroenterology    SINUS SURGERY      WISDOM TOOTH EXTRACTION       Social History   Social History     Substance and Sexual Activity   Alcohol Use Yes    Comment: occasionally     Social History     Substance and Sexual Activity   Drug Use No     Social History     Tobacco Use   Smoking Status Former Smoker   Smokeless Tobacco Never Used   Tobacco Comment    quit 3 years ago     No family history on file  Meds/Allergies       Current Outpatient Medications:     Ascorbic Acid (VITAMIN C) 1000 MG tablet    b complex vitamins tablet    brimonidine tartrate 0 2 % ophthalmic solution    Cyanocobalamin (VITAMIN B12 PO)    famotidine (PEPCID) 40 MG tablet    folic acid (FOLVITE) 1 mg tablet    insulin aspart (NovoLOG) 100 units/mL injection    latanoprost (XALATAN) 0 005 % ophthalmic solution    lisinopril (ZESTRIL) 10 mg tablet    METFORMIN HCL PO    Methotrexate Sodium (METHOTREXATE PO)    Multiple Vitamins-Minerals (PRESERVISION AREDS PO)    multivitamin (THERAGRAN) TABS    Omega-3 Fatty Acids (FISH OIL PO)    riTUXimab (RITUXAN) injection    simvastatin (ZOCOR) 20 mg tablet    Vitamin D, Cholecalciferol, 1000 units CAPS    FLUTICASONE PROPIONATE, INHAL, IN    glimepiride (AMARYL) 1 mg tablet    omeprazole (PriLOSEC) 20 mg delayed release capsule    Allergies   Allergen Reactions    Ceclor [Cefaclor]            Objective     Blood pressure 120/80, pulse 76, temperature 98 6 °F (37 °C), temperature source Tympanic, height 5' 10" (1 778 m), weight 74 4 kg (164 lb)  Body mass index is 23 53 kg/m²        PHYSICAL EXAM:      General Appearance:   Alert, cooperative, no distress   HEENT:   Normocephalic, atraumatic, anicteric      Neck:  Supple, symmetrical, trachea midline   Lungs:   Clear to auscultation bilaterally; no rales, rhonchi or wheezing; respirations unlabored    Heart[de-identified]   Regular rate and rhythm; no murmur, rub, or gallop  Abdomen:   Soft, non-tender, non-distended; normal bowel sounds; no masses, no organomegaly    Genitalia:   Deferred    Rectal:   Deferred    Extremities:  No cyanosis, clubbing or edema    Pulses:  2+ and symmetric    Skin:  No jaundice, rashes, or lesions    Lymph nodes:  No palpable cervical lymphadenopathy        Lab Results:   No visits with results within 1 Day(s) from this visit  Latest known visit with results is:   Appointment on 05/14/2019   Component Date Value    Cholesterol 05/14/2019 163     Triglycerides 05/14/2019 206*    HDL, Direct 05/14/2019 45     LDL Calculated 05/14/2019 77     Non-HDL-Chol (CHOL-HDL) 05/14/2019 118     Sodium 05/14/2019 133*    Potassium 05/14/2019 4 1     Chloride 05/14/2019 99*    CO2 05/14/2019 30     ANION GAP 05/14/2019 4     BUN 05/14/2019 12     Creatinine 05/14/2019 0 89     Glucose, Fasting 05/14/2019 138*    Calcium 05/14/2019 8 5     AST 05/14/2019 13     ALT 05/14/2019 32     Alkaline Phosphatase 05/14/2019 83     Total Protein 05/14/2019 7 4     Albumin 05/14/2019 4 0     Total Bilirubin 05/14/2019 0 38     eGFR 05/14/2019 96     Hemoglobin A1C 05/14/2019 7 7*    EAG 05/14/2019 174          Radiology Results:   No results found

## 2019-09-28 ENCOUNTER — TRANSCRIBE ORDERS (OUTPATIENT)
Dept: LAB | Facility: HOSPITAL | Age: 54
End: 2019-09-28

## 2019-09-28 ENCOUNTER — APPOINTMENT (OUTPATIENT)
Dept: LAB | Facility: HOSPITAL | Age: 54
End: 2019-09-28
Payer: COMMERCIAL

## 2019-09-28 DIAGNOSIS — E13.8 DIABETES MELLITUS OF OTHER TYPE WITH COMPLICATION, UNSPECIFIED WHETHER LONG TERM INSULIN USE: ICD-10-CM

## 2019-09-28 DIAGNOSIS — E13.8 DIABETES MELLITUS OF OTHER TYPE WITH COMPLICATION, UNSPECIFIED WHETHER LONG TERM INSULIN USE: Primary | ICD-10-CM

## 2019-09-28 DIAGNOSIS — E78.2 MIXED HYPERLIPIDEMIA: ICD-10-CM

## 2019-09-28 DIAGNOSIS — I10 ESSENTIAL HYPERTENSION, MALIGNANT: ICD-10-CM

## 2019-09-28 LAB
ALBUMIN SERPL BCP-MCNC: 4.1 G/DL (ref 3.5–5)
ALP SERPL-CCNC: 74 U/L (ref 46–116)
ALT SERPL W P-5'-P-CCNC: 41 U/L (ref 12–78)
ANION GAP SERPL CALCULATED.3IONS-SCNC: 6 MMOL/L (ref 4–13)
AST SERPL W P-5'-P-CCNC: 25 U/L (ref 5–45)
BILIRUB DIRECT SERPL-MCNC: 0.13 MG/DL (ref 0–0.2)
BILIRUB SERPL-MCNC: 0.46 MG/DL (ref 0.2–1)
BUN SERPL-MCNC: 14 MG/DL (ref 5–25)
CALCIUM SERPL-MCNC: 9.2 MG/DL (ref 8.3–10.1)
CHLORIDE SERPL-SCNC: 99 MMOL/L (ref 100–108)
CHOLEST SERPL-MCNC: 167 MG/DL (ref 50–200)
CO2 SERPL-SCNC: 27 MMOL/L (ref 21–32)
CREAT SERPL-MCNC: 0.84 MG/DL (ref 0.6–1.3)
EST. AVERAGE GLUCOSE BLD GHB EST-MCNC: 171 MG/DL
GFR SERPL CREATININE-BSD FRML MDRD: 99 ML/MIN/1.73SQ M
GLUCOSE P FAST SERPL-MCNC: 140 MG/DL (ref 65–99)
HBA1C MFR BLD: 7.6 % (ref 4.2–6.3)
HDLC SERPL-MCNC: 51 MG/DL (ref 40–60)
LDLC SERPL CALC-MCNC: 81 MG/DL (ref 0–100)
NONHDLC SERPL-MCNC: 116 MG/DL
POTASSIUM SERPL-SCNC: 4.2 MMOL/L (ref 3.5–5.3)
PROT SERPL-MCNC: 7.2 G/DL (ref 6.4–8.2)
SODIUM SERPL-SCNC: 132 MMOL/L (ref 136–145)
TRIGL SERPL-MCNC: 173 MG/DL

## 2019-09-28 PROCEDURE — 80061 LIPID PANEL: CPT

## 2019-09-28 PROCEDURE — 83036 HEMOGLOBIN GLYCOSYLATED A1C: CPT

## 2019-09-28 PROCEDURE — 36415 COLL VENOUS BLD VENIPUNCTURE: CPT

## 2019-09-28 PROCEDURE — 80053 COMPREHEN METABOLIC PANEL: CPT

## 2019-09-28 PROCEDURE — 82248 BILIRUBIN DIRECT: CPT

## 2019-12-06 LAB
LEFT EYE DIABETIC RETINOPATHY: NORMAL
RIGHT EYE DIABETIC RETINOPATHY: NORMAL

## 2020-02-08 ENCOUNTER — APPOINTMENT (OUTPATIENT)
Dept: LAB | Facility: HOSPITAL | Age: 55
End: 2020-02-08
Payer: COMMERCIAL

## 2020-02-08 ENCOUNTER — TRANSCRIBE ORDERS (OUTPATIENT)
Dept: LAB | Facility: HOSPITAL | Age: 55
End: 2020-02-08

## 2020-02-08 DIAGNOSIS — E78.2 MIXED HYPERLIPIDEMIA: ICD-10-CM

## 2020-02-08 DIAGNOSIS — Z00.01 ENCOUNTER FOR GENERAL ADULT MEDICAL EXAMINATION WITH ABNORMAL FINDINGS: ICD-10-CM

## 2020-02-08 DIAGNOSIS — R25.0 HEAD MOVEMENTS ABNORMAL: ICD-10-CM

## 2020-02-08 DIAGNOSIS — E13.69 OTHER SPECIFIED DIABETES MELLITUS WITH OTHER SPECIFIED COMPLICATION, UNSPECIFIED WHETHER LONG TERM INSULIN USE (HCC): Primary | ICD-10-CM

## 2020-02-08 DIAGNOSIS — E13.69 OTHER SPECIFIED DIABETES MELLITUS WITH OTHER SPECIFIED COMPLICATION, UNSPECIFIED WHETHER LONG TERM INSULIN USE (HCC): ICD-10-CM

## 2020-02-08 LAB
ALBUMIN SERPL BCP-MCNC: 4.1 G/DL (ref 3.5–5)
ALP SERPL-CCNC: 76 U/L (ref 46–116)
ALT SERPL W P-5'-P-CCNC: 47 U/L (ref 12–78)
ANION GAP SERPL CALCULATED.3IONS-SCNC: 2 MMOL/L (ref 4–13)
AST SERPL W P-5'-P-CCNC: 13 U/L (ref 5–45)
BILIRUB SERPL-MCNC: 0.45 MG/DL (ref 0.2–1)
BUN SERPL-MCNC: 13 MG/DL (ref 5–25)
CALCIUM SERPL-MCNC: 9.3 MG/DL (ref 8.3–10.1)
CHLORIDE SERPL-SCNC: 100 MMOL/L (ref 100–108)
CHOLEST SERPL-MCNC: 177 MG/DL (ref 50–200)
CO2 SERPL-SCNC: 29 MMOL/L (ref 21–32)
CREAT SERPL-MCNC: 0.9 MG/DL (ref 0.6–1.3)
EST. AVERAGE GLUCOSE BLD GHB EST-MCNC: 183 MG/DL
GFR SERPL CREATININE-BSD FRML MDRD: 95 ML/MIN/1.73SQ M
GLUCOSE P FAST SERPL-MCNC: 126 MG/DL (ref 65–99)
HBA1C MFR BLD: 8 % (ref 4.2–6.3)
HDLC SERPL-MCNC: 50 MG/DL
LDLC SERPL CALC-MCNC: 94 MG/DL (ref 0–100)
NONHDLC SERPL-MCNC: 127 MG/DL
POTASSIUM SERPL-SCNC: 4.8 MMOL/L (ref 3.5–5.3)
PROT SERPL-MCNC: 7.3 G/DL (ref 6.4–8.2)
PSA SERPL-MCNC: 1 NG/ML (ref 0–4)
SODIUM SERPL-SCNC: 131 MMOL/L (ref 136–145)
TRIGL SERPL-MCNC: 164 MG/DL
TSH SERPL DL<=0.05 MIU/L-ACNC: 2 UIU/ML (ref 0.36–3.74)

## 2020-02-08 PROCEDURE — 83036 HEMOGLOBIN GLYCOSYLATED A1C: CPT

## 2020-02-08 PROCEDURE — 80053 COMPREHEN METABOLIC PANEL: CPT

## 2020-02-08 PROCEDURE — 84153 ASSAY OF PSA TOTAL: CPT

## 2020-02-08 PROCEDURE — 36415 COLL VENOUS BLD VENIPUNCTURE: CPT

## 2020-02-08 PROCEDURE — 80061 LIPID PANEL: CPT

## 2020-02-08 PROCEDURE — 84443 ASSAY THYROID STIM HORMONE: CPT

## 2020-03-04 ENCOUNTER — PREP FOR PROCEDURE (OUTPATIENT)
Dept: GASTROENTEROLOGY | Facility: MEDICAL CENTER | Age: 55
End: 2020-03-04

## 2020-03-04 ENCOUNTER — TELEPHONE (OUTPATIENT)
Dept: GASTROENTEROLOGY | Facility: MEDICAL CENTER | Age: 55
End: 2020-03-04

## 2020-03-04 DIAGNOSIS — D89.89 IGG4 RELATED DISEASE (HCC): Primary | ICD-10-CM

## 2020-03-04 NOTE — TELEPHONE ENCOUNTER
Patient called in to schedule recall colon in May with Dr Veronica Moreira  The patient is scheduled at [facility] for a repeat colon with Dr Veronica Moreira on 05/06/2020  Miralax/Dulcolax and diabetic prep instructions will be sent to the patient  He is aware that he will receive a call with the arrival time the day prior to procedure and that he will need a  the day of the procedure   I have asked the patient to call with any questions that he might have prior to procedure

## 2020-03-04 NOTE — PATIENT INSTRUCTIONS
Patient called in to schedule recall colon in May with Dr Latrice Paz  The patient is scheduled at [facility] for a repeat colon with Dr Latrice Paz on 05/06/2020  Miralax/Dulcolax and diabetic prep instructions will be sent to the patient  He is aware that he will receive a call with the arrival time the day prior to procedure and that he will need a  the day of the procedure   I have asked the patient to call with any questions that he might have prior to procedure

## 2020-04-17 NOTE — TELEPHONE ENCOUNTER
Pt is rescheduled due to covid with dr Anoop Carr to 7/9/20 at [facility] pt is diabetic

## 2020-05-06 ENCOUNTER — TRANSCRIBE ORDERS (OUTPATIENT)
Dept: LAB | Facility: HOSPITAL | Age: 55
End: 2020-05-06

## 2020-05-06 ENCOUNTER — TELEPHONE (OUTPATIENT)
Dept: LAB | Facility: HOSPITAL | Age: 55
End: 2020-05-06

## 2020-05-06 DIAGNOSIS — D89.89 SECONDARY IMMUNE DEFICIENCY DISORDER (HCC): Primary | ICD-10-CM

## 2020-05-15 ENCOUNTER — APPOINTMENT (OUTPATIENT)
Dept: LAB | Facility: HOSPITAL | Age: 55
End: 2020-05-15
Payer: COMMERCIAL

## 2020-05-15 DIAGNOSIS — D89.89 SECONDARY IMMUNE DEFICIENCY DISORDER (HCC): ICD-10-CM

## 2020-05-15 LAB
ALBUMIN SERPL BCP-MCNC: 4.3 G/DL (ref 3.5–5)
ALP SERPL-CCNC: 95 U/L (ref 46–116)
ALT SERPL W P-5'-P-CCNC: 41 U/L (ref 12–78)
ANION GAP SERPL CALCULATED.3IONS-SCNC: 6 MMOL/L (ref 4–13)
AST SERPL W P-5'-P-CCNC: 20 U/L (ref 5–45)
BASOPHILS # BLD AUTO: 0.04 THOUSANDS/ΜL (ref 0–0.1)
BASOPHILS NFR BLD AUTO: 1 % (ref 0–1)
BILIRUB SERPL-MCNC: 0.45 MG/DL (ref 0.2–1)
BUN SERPL-MCNC: 12 MG/DL (ref 5–25)
CALCIUM SERPL-MCNC: 9.6 MG/DL (ref 8.3–10.1)
CHLORIDE SERPL-SCNC: 96 MMOL/L (ref 100–108)
CO2 SERPL-SCNC: 28 MMOL/L (ref 21–32)
CREAT SERPL-MCNC: 0.9 MG/DL (ref 0.6–1.3)
CRP SERPL QL: 7.3 MG/L
EOSINOPHIL # BLD AUTO: 0.27 THOUSAND/ΜL (ref 0–0.61)
EOSINOPHIL NFR BLD AUTO: 3 % (ref 0–6)
ERYTHROCYTE [DISTWIDTH] IN BLOOD BY AUTOMATED COUNT: 13.8 % (ref 11.6–15.1)
ERYTHROCYTE [SEDIMENTATION RATE] IN BLOOD: 23 MM/HOUR (ref 0–10)
GFR SERPL CREATININE-BSD FRML MDRD: 95 ML/MIN/1.73SQ M
GLUCOSE P FAST SERPL-MCNC: 180 MG/DL (ref 65–99)
HCT VFR BLD AUTO: 44.4 % (ref 36.5–49.3)
HGB BLD-MCNC: 14.9 G/DL (ref 12–17)
IGA SERPL-MCNC: 169 MG/DL (ref 70–400)
IGG SERPL-MCNC: 757 MG/DL (ref 700–1600)
IGM SERPL-MCNC: 24 MG/DL (ref 40–230)
IMM GRANULOCYTES # BLD AUTO: 0.03 THOUSAND/UL (ref 0–0.2)
IMM GRANULOCYTES NFR BLD AUTO: 0 % (ref 0–2)
LYMPHOCYTES # BLD AUTO: 0.96 THOUSANDS/ΜL (ref 0.6–4.47)
LYMPHOCYTES NFR BLD AUTO: 12 % (ref 14–44)
MCH RBC QN AUTO: 31.4 PG (ref 26.8–34.3)
MCHC RBC AUTO-ENTMCNC: 33.6 G/DL (ref 31.4–37.4)
MCV RBC AUTO: 94 FL (ref 82–98)
MONOCYTES # BLD AUTO: 0.75 THOUSAND/ΜL (ref 0.17–1.22)
MONOCYTES NFR BLD AUTO: 10 % (ref 4–12)
NEUTROPHILS # BLD AUTO: 5.81 THOUSANDS/ΜL (ref 1.85–7.62)
NEUTS SEG NFR BLD AUTO: 74 % (ref 43–75)
NRBC BLD AUTO-RTO: 0 /100 WBCS
PLATELET # BLD AUTO: 346 THOUSANDS/UL (ref 149–390)
PMV BLD AUTO: 8.9 FL (ref 8.9–12.7)
POTASSIUM SERPL-SCNC: 4.7 MMOL/L (ref 3.5–5.3)
PROT SERPL-MCNC: 7.7 G/DL (ref 6.4–8.2)
RBC # BLD AUTO: 4.75 MILLION/UL (ref 3.88–5.62)
SODIUM SERPL-SCNC: 130 MMOL/L (ref 136–145)
WBC # BLD AUTO: 7.86 THOUSAND/UL (ref 4.31–10.16)

## 2020-05-15 PROCEDURE — 85025 COMPLETE CBC W/AUTO DIFF WBC: CPT

## 2020-05-15 PROCEDURE — 82784 ASSAY IGA/IGD/IGG/IGM EACH: CPT

## 2020-05-15 PROCEDURE — 36415 COLL VENOUS BLD VENIPUNCTURE: CPT

## 2020-05-15 PROCEDURE — 85652 RBC SED RATE AUTOMATED: CPT

## 2020-05-15 PROCEDURE — 82787 IGG 1 2 3 OR 4 EACH: CPT

## 2020-05-15 PROCEDURE — 86140 C-REACTIVE PROTEIN: CPT

## 2020-05-15 PROCEDURE — 80053 COMPREHEN METABOLIC PANEL: CPT

## 2020-05-18 LAB
IGG SERPL-MCNC: 762 MG/DL (ref 603–1613)
IGG1 SER-MCNC: 365 MG/DL (ref 248–810)
IGG2 SER-MCNC: 204 MG/DL (ref 130–555)
IGG3 SER-MCNC: 52 MG/DL (ref 15–102)
IGG4 SER-MCNC: 77 MG/DL (ref 2–96)

## 2020-06-19 ENCOUNTER — TRANSCRIBE ORDERS (OUTPATIENT)
Dept: LAB | Facility: HOSPITAL | Age: 55
End: 2020-06-19

## 2020-06-19 ENCOUNTER — APPOINTMENT (OUTPATIENT)
Dept: LAB | Facility: HOSPITAL | Age: 55
End: 2020-06-19
Payer: COMMERCIAL

## 2020-06-19 DIAGNOSIS — I10 ESSENTIAL HYPERTENSION, MALIGNANT: ICD-10-CM

## 2020-06-19 DIAGNOSIS — E08.00 DIABETES MELLITUS DUE TO UNDERLYING CONDITION WITH HYPEROSMOLARITY WITHOUT COMA, WITHOUT LONG-TERM CURRENT USE OF INSULIN (HCC): Primary | ICD-10-CM

## 2020-06-19 DIAGNOSIS — E08.00 DIABETES MELLITUS DUE TO UNDERLYING CONDITION WITH HYPEROSMOLARITY WITHOUT COMA, WITHOUT LONG-TERM CURRENT USE OF INSULIN (HCC): ICD-10-CM

## 2020-06-19 DIAGNOSIS — E78.2 MIXED HYPERLIPIDEMIA: ICD-10-CM

## 2020-06-19 LAB
ALBUMIN SERPL BCP-MCNC: 4.3 G/DL (ref 3.5–5)
ALP SERPL-CCNC: 95 U/L (ref 46–116)
ALT SERPL W P-5'-P-CCNC: 40 U/L (ref 12–78)
ANION GAP SERPL CALCULATED.3IONS-SCNC: 5 MMOL/L (ref 4–13)
AST SERPL W P-5'-P-CCNC: 19 U/L (ref 5–45)
BILIRUB SERPL-MCNC: 0.41 MG/DL (ref 0.2–1)
BUN SERPL-MCNC: 11 MG/DL (ref 5–25)
CALCIUM SERPL-MCNC: 9.4 MG/DL (ref 8.3–10.1)
CHLORIDE SERPL-SCNC: 101 MMOL/L (ref 100–108)
CHOLEST SERPL-MCNC: 188 MG/DL (ref 50–200)
CO2 SERPL-SCNC: 26 MMOL/L (ref 21–32)
CREAT SERPL-MCNC: 0.76 MG/DL (ref 0.6–1.3)
EST. AVERAGE GLUCOSE BLD GHB EST-MCNC: 177 MG/DL
GFR SERPL CREATININE-BSD FRML MDRD: 102 ML/MIN/1.73SQ M
GLUCOSE P FAST SERPL-MCNC: 138 MG/DL (ref 65–99)
HBA1C MFR BLD: 7.8 %
HDLC SERPL-MCNC: 55 MG/DL
LDLC SERPL CALC-MCNC: 94 MG/DL (ref 0–100)
NONHDLC SERPL-MCNC: 133 MG/DL
POTASSIUM SERPL-SCNC: 4.3 MMOL/L (ref 3.5–5.3)
PROT SERPL-MCNC: 7.5 G/DL (ref 6.4–8.2)
SODIUM SERPL-SCNC: 132 MMOL/L (ref 136–145)
TRIGL SERPL-MCNC: 193 MG/DL

## 2020-06-19 PROCEDURE — 83036 HEMOGLOBIN GLYCOSYLATED A1C: CPT

## 2020-06-19 PROCEDURE — 80053 COMPREHEN METABOLIC PANEL: CPT

## 2020-06-19 PROCEDURE — 80061 LIPID PANEL: CPT

## 2020-06-19 PROCEDURE — 36415 COLL VENOUS BLD VENIPUNCTURE: CPT

## 2020-06-26 ENCOUNTER — ANESTHESIA EVENT (OUTPATIENT)
Dept: GASTROENTEROLOGY | Facility: MEDICAL CENTER | Age: 55
End: 2020-06-26

## 2020-06-29 ENCOUNTER — TELEPHONE (OUTPATIENT)
Dept: GASTROENTEROLOGY | Facility: CLINIC | Age: 55
End: 2020-06-29

## 2020-06-30 ENCOUNTER — PREP FOR PROCEDURE (OUTPATIENT)
Dept: GASTROENTEROLOGY | Facility: MEDICAL CENTER | Age: 55
End: 2020-06-30

## 2020-06-30 DIAGNOSIS — Z20.822 ENCOUNTER FOR LABORATORY TESTING FOR COVID-19 VIRUS: Primary | ICD-10-CM

## 2020-07-02 DIAGNOSIS — Z20.822 ENCOUNTER FOR LABORATORY TESTING FOR COVID-19 VIRUS: ICD-10-CM

## 2020-07-02 PROCEDURE — U0003 INFECTIOUS AGENT DETECTION BY NUCLEIC ACID (DNA OR RNA); SEVERE ACUTE RESPIRATORY SYNDROME CORONAVIRUS 2 (SARS-COV-2) (CORONAVIRUS DISEASE [COVID-19]), AMPLIFIED PROBE TECHNIQUE, MAKING USE OF HIGH THROUGHPUT TECHNOLOGIES AS DESCRIBED BY CMS-2020-01-R: HCPCS

## 2020-07-09 ENCOUNTER — HOSPITAL ENCOUNTER (OUTPATIENT)
Dept: GASTROENTEROLOGY | Facility: MEDICAL CENTER | Age: 55
Setting detail: OUTPATIENT SURGERY
Discharge: HOME | End: 2020-07-09
Attending: INTERNAL MEDICINE | Admitting: INTERNAL MEDICINE
Payer: COMMERCIAL

## 2020-07-09 ENCOUNTER — ANESTHESIA (OUTPATIENT)
Dept: GASTROENTEROLOGY | Facility: MEDICAL CENTER | Age: 55
End: 2020-07-09

## 2020-07-09 VITALS
HEIGHT: 70 IN | OXYGEN SATURATION: 96 % | BODY MASS INDEX: 23.62 KG/M2 | TEMPERATURE: 97.5 F | DIASTOLIC BLOOD PRESSURE: 74 MMHG | WEIGHT: 165 LBS | SYSTOLIC BLOOD PRESSURE: 119 MMHG | HEART RATE: 68 BPM | RESPIRATION RATE: 16 BRPM

## 2020-07-09 DIAGNOSIS — D89.89 IGG4 RELATED DISEASE (HCC): ICD-10-CM

## 2020-07-09 LAB — SARS-COV-2 RNA SPEC QL NAA+PROBE: NOT DETECTED

## 2020-07-09 PROCEDURE — 88305 TISSUE EXAM BY PATHOLOGIST: CPT | Performed by: PATHOLOGY

## 2020-07-09 PROCEDURE — 45385 COLONOSCOPY W/LESION REMOVAL: CPT | Performed by: INTERNAL MEDICINE

## 2020-07-09 RX ORDER — PROPOFOL 10 MG/ML
INJECTION, EMULSION INTRAVENOUS AS NEEDED
Status: DISCONTINUED | OUTPATIENT
Start: 2020-07-09 | End: 2020-07-09 | Stop reason: SURG

## 2020-07-09 RX ORDER — SODIUM CHLORIDE 9 MG/ML
125 INJECTION, SOLUTION INTRAVENOUS CONTINUOUS
Status: DISCONTINUED | OUTPATIENT
Start: 2020-07-09 | End: 2020-07-13 | Stop reason: HOSPADM

## 2020-07-09 RX ADMIN — SODIUM CHLORIDE 125 ML/HR: 0.9 INJECTION, SOLUTION INTRAVENOUS at 08:36

## 2020-07-09 RX ADMIN — PROPOFOL 50 MG: 10 INJECTION, EMULSION INTRAVENOUS at 08:51

## 2020-07-09 RX ADMIN — PROPOFOL 150 MG: 10 INJECTION, EMULSION INTRAVENOUS at 08:48

## 2020-07-09 RX ADMIN — PROPOFOL 50 MG: 10 INJECTION, EMULSION INTRAVENOUS at 08:57

## 2020-07-09 NOTE — DISCHARGE INSTRUCTIONS
Colonoscopy   WHAT YOU NEED TO KNOW:   A colonoscopy is a procedure to examine the inside of your colon (intestine) with a scope  Polyps or tissue growths may have been removed during your colonoscopy  It is normal to feel bloated and to have some abdominal discomfort  You should be passing gas  If you have hemorrhoids or you had polyps removed, you may have a small amount of bleeding  DISCHARGE INSTRUCTIONS:   Seek care immediately if:   · You have a large amount of bright red blood in your bowel movements  · Your abdomen is hard and firm and you have severe pain  · You have sudden trouble breathing  Contact your healthcare provider if:   · You develop a rash or hives  · You have a fever within 24 hours of your procedure  · You have not had a bowel movement for 3 days after your procedure  · You have questions or concerns about your condition or care  Activity:   · Do not lift, strain, or run  for 3 days after your procedure  · Rest after your procedure  You have been given medicine to relax you  Do not  drive or make important decisions until the day after your procedure  Return to your normal activity as directed  · Relieve gas and discomfort from bloating  by lying on your right side with a heating pad on your abdomen  You may need to take short walks to help the gas move out  Eat small meals until bloating is relieved  If you had polyps removed: For 7 days after your procedure:  · Do not  take aspirin  · Do not  go on long car rides  Help prevent constipation:   · Eat a variety of healthy foods  Healthy foods include fruit, vegetables, whole-grain breads, low-fat dairy products, beans, lean meat, and fish  Ask if you need to be on a special diet  Your healthcare provider may recommend that you eat high-fiber foods such as cooked beans  Fiber helps you have regular bowel movements  · Drink liquids as directed    Adults should drink between 9 and 13 eight-ounce cups of liquid every day  Ask what amount is best for you  For most people, good liquids to drink are water, juice, and milk  · Exercise as directed  Talk to your healthcare provider about the best exercise plan for you  Exercise can help prevent constipation, decrease your blood pressure and improve your health  Follow up with your healthcare provider as directed:  Write down your questions so you remember to ask them during your visits  © 2017 [address] [employer] Information is for End User's use only and may not be sold, redistributed or otherwise used for commercial purposes  All illustrations and images included in CareNotes® are the copyrighted property of A D A M , Inc  or Fidel Coyne  The above information is an  only  It is not intended as medical advice for individual conditions or treatments  Talk to your doctor, nurse or pharmacist before following any medical regimen to see if it is safe and effective for you  Colorectal Polyps   WHAT YOU NEED TO KNOW:   Colorectal polyps are small growths of tissue in the lining of the colon and rectum  Most polyps are hyperplastic polyps and are usually benign (noncancerous)  Certain types of polyps, called adenomatous polyps, may turn into cancer  DISCHARGE INSTRUCTIONS:   Follow up with your healthcare provider or gastroenterologist as directed: You may need to return for more tests, such as another colonoscopy  Write down your questions so you remember to ask them during your visits  Reduce your risk for colorectal polyps:   · Eat a variety of healthy foods:  Healthy foods include fruit, vegetables, whole-grain breads, low-fat dairy products, beans, lean meat, and fish  Ask if you need to be on a special diet  · Maintain a healthy weight:  Ask your healthcare provider if you need to lose weight and how much you need to lose   Ask for help with a weight loss program     · Exercise:  Begin to exercise slowly and do more as you get stronger  Talk with your healthcare provider before you start an exercise program      · Limit alcohol:  Your risk for polyps increases the more you drink  · Do not smoke: If you smoke, it is never too late to quit  Ask for information about how to stop  For support and more information:   · Dajuan Marshall ([facility])  [address] , [place] [address]  Phone: [phone]  Web Address: www digestive  niddk nih gov  Contact your healthcare provider or gastroenterologist if:   · You have a fever  · You have chills, a cough, or feel weak and achy  · You have abdominal pain that does not go away or gets worse after you take medicine  · Your abdomen is swollen  · You are losing weight without trying  · You have questions or concerns about your condition or care  Seek care immediately or call 911 if:   · You have sudden shortness of breath  · You have a fast heart rate, fast breathing, or are too dizzy to stand up  · You have severe abdominal pain  · You see blood in your bowel movement  © 2017 [address] [facility] Information is for End User's use only and may not be sold, redistributed or otherwise used for commercial purposes  All illustrations and images included in CareNotes® are the copyrighted property of A D A M , Inc  or Fidel Coyne  The above information is an  only  It is not intended as medical advice for individual conditions or treatments  Talk to your doctor, nurse or pharmacist before following any medical regimen to see if it is safe and effective for you

## 2020-07-09 NOTE — ANESTHESIA POSTPROCEDURE EVALUATION
Post-Op Assessment Note    CV Status:  Stable  Pain Score: 2    Pain management: adequate     Mental Status:  Alert and awake   Hydration Status:  Euvolemic   PONV Controlled:  Controlled   Airway Patency:  Patent   Post Op Vitals Reviewed: Yes      Staff: Anesthesiologist           /76 (07/09/20 0908)    Temp      Pulse 71 (07/09/20 0908)   Resp 16 (07/09/20 0908)    SpO2 96 % (07/09/20 0908)

## 2020-07-09 NOTE — ANESTHESIA PREPROCEDURE EVALUATION
Review of Systems/Medical History  Patient summary reviewed  Chart reviewed      Cardiovascular  Exercise tolerance (METS): >4,  Hyperlipidemia, Hypertension ,    Pulmonary  Negative pulmonary ROS        GI/Hepatic    GERD ,             Endo/Other  Diabetes type 2 Oral agent,      GYN       Hematology   Musculoskeletal  Negative musculoskeletal ROS        Neurology  Negative neurology ROS      Psychology   Negative psychology ROS              Physical Exam    Airway    Mallampati score: II  TM Distance: <3 FB  Neck ROM: full     Dental       Cardiovascular  Rhythm: regular, Rate: normal,     Pulmonary  Breath sounds clear to auscultation,     Other Findings        Anesthesia Plan  ASA Score- 3     Anesthesia Type- IV sedation with anesthesia with ASA Monitors  Additional Monitors:   Airway Plan:         Plan Factors-Patient not instructed to abstain from smoking on day of procedure  Patient did not smoke on day of surgery  Induction- intravenous  Postoperative Plan-     Informed Consent- Anesthetic plan and risks discussed with patient

## 2020-07-09 NOTE — H&P
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[address] )     Sarcoid      Past Surgical History:   Procedure Laterality Date    COLONOSCOPY      ESOPHAGOGASTRODUODENOSCOPY N/A 4/2/2019    Procedure: ESOPHAGOGASTRODUODENOSCOPY (EGD); Surgeon: Regulo Valencia MD;  Location: [facility] GI LAB; Service: Gastroenterology    LYMPH NODE BIOPSY      OPTIC NERVE DECOMPRESSION      [place] ESOPHAGOGASTRODUODENOSCOPY TRANSORAL DIAGNOSTIC N/A 5/26/2017    Procedure: EGD AND COLONOSCOPY;  Surgeon: Regulo Valencia MD;  Location:  GI LAB; Service: Gastroenterology    SINUS SURGERY      WISDOM TOOTH EXTRACTION       Social History   Social History     Substance and Sexual Activity   Alcohol Use Yes    Comment: occasionally     Social History     Substance and Sexual Activity   Drug Use No     Social History     Tobacco Use   Smoking Status Former Smoker   Smokeless Tobacco Never Used   Tobacco Comment    quit 3 years ago     No family history on file  Meds/Allergies       (Not in a hospital admission)    Allergies   Allergen Reactions    Ceclor [Cefaclor]        Objective     There were no vitals taken for this visit  PHYSICAL EXAM    Gen: NAD  CV: RRR  CHEST: Clear  ABD: soft, NT/ND  EXT: no edema      ASSESSMENT/PLAN:  This is a 62y o  year old male here for colonoscopy, and he is stable and optimized for his procedure

## 2020-10-11 ENCOUNTER — LAB (OUTPATIENT)
Dept: LAB | Facility: HOSPITAL | Age: 55
End: 2020-10-11
Payer: COMMERCIAL

## 2020-10-11 ENCOUNTER — TRANSCRIBE ORDERS (OUTPATIENT)
Dept: LAB | Facility: HOSPITAL | Age: 55
End: 2020-10-11

## 2020-10-11 DIAGNOSIS — E55.9 AVITAMINOSIS D: ICD-10-CM

## 2020-10-11 DIAGNOSIS — I10 ESSENTIAL HYPERTENSION, MALIGNANT: Primary | ICD-10-CM

## 2020-10-11 DIAGNOSIS — E08.69 DIABETES MELLITUS DUE TO UNDERLYING CONDITION WITH OTHER SPECIFIED COMPLICATION, UNSPECIFIED WHETHER LONG TERM INSULIN USE (HCC): ICD-10-CM

## 2020-10-11 DIAGNOSIS — Z11.59 SCREENING EXAMINATION FOR POLIOMYELITIS: ICD-10-CM

## 2020-10-11 DIAGNOSIS — E78.2 MIXED HYPERLIPIDEMIA: ICD-10-CM

## 2020-10-11 DIAGNOSIS — I10 ESSENTIAL HYPERTENSION, MALIGNANT: ICD-10-CM

## 2020-10-11 LAB
25(OH)D3 SERPL-MCNC: 31 NG/ML (ref 30–100)
ALBUMIN SERPL BCP-MCNC: 4.5 G/DL (ref 3.5–5)
ALP SERPL-CCNC: 92 U/L (ref 46–116)
ALT SERPL W P-5'-P-CCNC: 58 U/L (ref 12–78)
ANION GAP SERPL CALCULATED.3IONS-SCNC: 4 MMOL/L (ref 4–13)
AST SERPL W P-5'-P-CCNC: 23 U/L (ref 5–45)
BASOPHILS # BLD AUTO: 0.07 THOUSANDS/ΜL (ref 0–0.1)
BASOPHILS NFR BLD AUTO: 1 % (ref 0–1)
BILIRUB SERPL-MCNC: 0.54 MG/DL (ref 0.2–1)
BILIRUB UR QL STRIP: NEGATIVE
BUN SERPL-MCNC: 13 MG/DL (ref 5–25)
CALCIUM SERPL-MCNC: 9.9 MG/DL (ref 8.3–10.1)
CHLORIDE SERPL-SCNC: 99 MMOL/L (ref 100–108)
CHOLEST SERPL-MCNC: 204 MG/DL (ref 50–200)
CLARITY UR: CLEAR
CO2 SERPL-SCNC: 29 MMOL/L (ref 21–32)
COLOR UR: YELLOW
CREAT SERPL-MCNC: 0.86 MG/DL (ref 0.6–1.3)
EOSINOPHIL # BLD AUTO: 0.21 THOUSAND/ΜL (ref 0–0.61)
EOSINOPHIL NFR BLD AUTO: 3 % (ref 0–6)
ERYTHROCYTE [DISTWIDTH] IN BLOOD BY AUTOMATED COUNT: 13.8 % (ref 11.6–15.1)
EST. AVERAGE GLUCOSE BLD GHB EST-MCNC: 194 MG/DL
GFR SERPL CREATININE-BSD FRML MDRD: 96 ML/MIN/1.73SQ M
GLUCOSE P FAST SERPL-MCNC: 137 MG/DL (ref 65–99)
GLUCOSE UR STRIP-MCNC: NEGATIVE MG/DL
HBA1C MFR BLD: 8.4 %
HCT VFR BLD AUTO: 44.6 % (ref 36.5–49.3)
HCV AB SER QL: NORMAL
HDLC SERPL-MCNC: 61 MG/DL
HGB BLD-MCNC: 15.5 G/DL (ref 12–17)
HGB UR QL STRIP.AUTO: NEGATIVE
IMM GRANULOCYTES # BLD AUTO: 0.03 THOUSAND/UL (ref 0–0.2)
IMM GRANULOCYTES NFR BLD AUTO: 0 % (ref 0–2)
KETONES UR STRIP-MCNC: NEGATIVE MG/DL
LDLC SERPL CALC-MCNC: 111 MG/DL (ref 0–100)
LEUKOCYTE ESTERASE UR QL STRIP: NEGATIVE
LYMPHOCYTES # BLD AUTO: 0.91 THOUSANDS/ΜL (ref 0.6–4.47)
LYMPHOCYTES NFR BLD AUTO: 12 % (ref 14–44)
MCH RBC QN AUTO: 31.8 PG (ref 26.8–34.3)
MCHC RBC AUTO-ENTMCNC: 34.8 G/DL (ref 31.4–37.4)
MCV RBC AUTO: 91 FL (ref 82–98)
MONOCYTES # BLD AUTO: 0.96 THOUSAND/ΜL (ref 0.17–1.22)
MONOCYTES NFR BLD AUTO: 13 % (ref 4–12)
NEUTROPHILS # BLD AUTO: 5.46 THOUSANDS/ΜL (ref 1.85–7.62)
NEUTS SEG NFR BLD AUTO: 71 % (ref 43–75)
NITRITE UR QL STRIP: NEGATIVE
NONHDLC SERPL-MCNC: 143 MG/DL
NRBC BLD AUTO-RTO: 0 /100 WBCS
PH UR STRIP.AUTO: 7.5 [PH]
PLATELET # BLD AUTO: 338 THOUSANDS/UL (ref 149–390)
PMV BLD AUTO: 8.6 FL (ref 8.9–12.7)
POTASSIUM SERPL-SCNC: 4.5 MMOL/L (ref 3.5–5.3)
PROT SERPL-MCNC: 8 G/DL (ref 6.4–8.2)
PROT UR STRIP-MCNC: NEGATIVE MG/DL
RBC # BLD AUTO: 4.88 MILLION/UL (ref 3.88–5.62)
SODIUM SERPL-SCNC: 132 MMOL/L (ref 136–145)
SP GR UR STRIP.AUTO: 1.01 (ref 1–1.03)
TRIGL SERPL-MCNC: 161 MG/DL
UROBILINOGEN UR QL STRIP.AUTO: 1 E.U./DL
WBC # BLD AUTO: 7.64 THOUSAND/UL (ref 4.31–10.16)

## 2020-10-11 PROCEDURE — 85025 COMPLETE CBC W/AUTO DIFF WBC: CPT

## 2020-10-11 PROCEDURE — 83036 HEMOGLOBIN GLYCOSYLATED A1C: CPT

## 2020-10-11 PROCEDURE — 80053 COMPREHEN METABOLIC PANEL: CPT

## 2020-10-11 PROCEDURE — 86803 HEPATITIS C AB TEST: CPT

## 2020-10-11 PROCEDURE — 36415 COLL VENOUS BLD VENIPUNCTURE: CPT

## 2020-10-11 PROCEDURE — 80061 LIPID PANEL: CPT

## 2020-10-11 PROCEDURE — 82306 VITAMIN D 25 HYDROXY: CPT

## 2020-10-11 PROCEDURE — 81003 URINALYSIS AUTO W/O SCOPE: CPT

## 2020-12-15 ENCOUNTER — OFFICE VISIT (OUTPATIENT)
Dept: ENDOCRINOLOGY | Facility: CLINIC | Age: 55
End: 2020-12-15
Payer: COMMERCIAL

## 2020-12-15 VITALS
HEIGHT: 70 IN | DIASTOLIC BLOOD PRESSURE: 92 MMHG | HEART RATE: 80 BPM | BODY MASS INDEX: 24.55 KG/M2 | SYSTOLIC BLOOD PRESSURE: 150 MMHG | WEIGHT: 171.5 LBS

## 2020-12-15 DIAGNOSIS — Z79.4 TYPE 2 DIABETES MELLITUS WITH HYPERGLYCEMIA, WITH LONG-TERM CURRENT USE OF INSULIN (HCC): Primary | ICD-10-CM

## 2020-12-15 DIAGNOSIS — E78.2 MIXED HYPERLIPIDEMIA: ICD-10-CM

## 2020-12-15 DIAGNOSIS — E11.65 TYPE 2 DIABETES MELLITUS WITH HYPERGLYCEMIA, WITH LONG-TERM CURRENT USE OF INSULIN (HCC): Primary | ICD-10-CM

## 2020-12-15 DIAGNOSIS — I10 ESSENTIAL HYPERTENSION: ICD-10-CM

## 2020-12-15 PROCEDURE — 3008F BODY MASS INDEX DOCD: CPT | Performed by: INTERNAL MEDICINE

## 2020-12-15 PROCEDURE — 99204 OFFICE O/P NEW MOD 45 MIN: CPT | Performed by: INTERNAL MEDICINE

## 2020-12-15 PROCEDURE — 3080F DIAST BP >= 90 MM HG: CPT | Performed by: INTERNAL MEDICINE

## 2020-12-15 PROCEDURE — 3077F SYST BP >= 140 MM HG: CPT | Performed by: INTERNAL MEDICINE

## 2020-12-15 PROCEDURE — 1036F TOBACCO NON-USER: CPT | Performed by: INTERNAL MEDICINE

## 2020-12-15 RX ORDER — PEN NEEDLE, DIABETIC 31 GX5/16"
NEEDLE, DISPOSABLE MISCELLANEOUS 2 TIMES DAILY
COMMUNITY
Start: 2020-11-24 | End: 2021-04-12 | Stop reason: SDUPTHER

## 2020-12-15 RX ORDER — LISINOPRIL 20 MG/1
20 TABLET ORAL DAILY
COMMUNITY
Start: 2020-12-05 | End: 2021-06-02 | Stop reason: SDUPTHER

## 2020-12-15 RX ORDER — INSULIN ASPART 100 [IU]/ML
6 INJECTION, SUSPENSION SUBCUTANEOUS 2 TIMES DAILY
COMMUNITY
Start: 2020-11-18 | End: 2021-09-03 | Stop reason: SDUPTHER

## 2020-12-18 ENCOUNTER — TELEPHONE (OUTPATIENT)
Dept: ENDOCRINOLOGY | Facility: CLINIC | Age: 55
End: 2020-12-18

## 2020-12-18 NOTE — TELEPHONE ENCOUNTER
I did try to call Pt's insurance multiple times, Couldn't to talk to any representative  Request was sent to [facility] via [facility] portal, Order was approve by DR Hernandez Or  Called pt, left message regarding Dexcom GC6 request, Advised pt to callus back with any questions

## 2020-12-18 NOTE — TELEPHONE ENCOUNTER
----- Message from Maribell Guadalupe MD sent at 12/15/2020  3:02 PM EST -----  Regarding: CGM coverage  Can you please see if his insurance covers either the [place] or the Dexcom?  Thanks

## 2020-12-19 ENCOUNTER — LAB (OUTPATIENT)
Dept: LAB | Age: 55
End: 2020-12-19
Payer: COMMERCIAL

## 2020-12-19 DIAGNOSIS — E11.65 TYPE 2 DIABETES MELLITUS WITH HYPERGLYCEMIA, WITH LONG-TERM CURRENT USE OF INSULIN (HCC): ICD-10-CM

## 2020-12-19 DIAGNOSIS — Z79.4 TYPE 2 DIABETES MELLITUS WITH HYPERGLYCEMIA, WITH LONG-TERM CURRENT USE OF INSULIN (HCC): ICD-10-CM

## 2020-12-19 LAB — GLUCOSE P FAST SERPL-MCNC: 157 MG/DL (ref 65–99)

## 2020-12-19 PROCEDURE — 84681 ASSAY OF C-PEPTIDE: CPT

## 2020-12-19 PROCEDURE — 82947 ASSAY GLUCOSE BLOOD QUANT: CPT

## 2020-12-19 PROCEDURE — 83519 RIA NONANTIBODY: CPT

## 2020-12-19 PROCEDURE — 36415 COLL VENOUS BLD VENIPUNCTURE: CPT

## 2020-12-20 LAB — C PEPTIDE SERPL-MCNC: 1.3 NG/ML (ref 1.1–4.4)

## 2020-12-21 LAB — GAD65 AB SER-ACNC: <5 U/ML (ref 0–5)

## 2020-12-21 NOTE — TELEPHONE ENCOUNTER
Jaime Sports ([facility])  Hello! We have left a voicemail to have this patient confirm the order  Documents are being reviewed at this time  As soon as the documents are reviewed, authorization from insurance comes in, and patient consents, the order will ship  Thank you and have a great day!

## 2020-12-28 ENCOUNTER — OFFICE VISIT (OUTPATIENT)
Dept: INTERNAL MEDICINE CLINIC | Facility: CLINIC | Age: 55
End: 2020-12-28
Payer: COMMERCIAL

## 2020-12-28 VITALS
HEART RATE: 68 BPM | HEIGHT: 70 IN | BODY MASS INDEX: 24.6 KG/M2 | SYSTOLIC BLOOD PRESSURE: 132 MMHG | RESPIRATION RATE: 18 BRPM | WEIGHT: 171.8 LBS | TEMPERATURE: 98.1 F | OXYGEN SATURATION: 99 % | DIASTOLIC BLOOD PRESSURE: 82 MMHG

## 2020-12-28 DIAGNOSIS — M25.562 CHRONIC PAIN OF LEFT KNEE: ICD-10-CM

## 2020-12-28 DIAGNOSIS — D89.89 IGG4 RELATED DISEASE (HCC): ICD-10-CM

## 2020-12-28 DIAGNOSIS — E11.65 TYPE 2 DIABETES MELLITUS WITH HYPERGLYCEMIA, WITH LONG-TERM CURRENT USE OF INSULIN (HCC): Primary | ICD-10-CM

## 2020-12-28 DIAGNOSIS — K85.00 IDIOPATHIC ACUTE PANCREATITIS WITHOUT INFECTION OR NECROSIS: ICD-10-CM

## 2020-12-28 DIAGNOSIS — G89.29 CHRONIC PAIN OF LEFT KNEE: ICD-10-CM

## 2020-12-28 DIAGNOSIS — Z79.4 TYPE 2 DIABETES MELLITUS WITH HYPERGLYCEMIA, WITH LONG-TERM CURRENT USE OF INSULIN (HCC): Primary | ICD-10-CM

## 2020-12-28 DIAGNOSIS — I10 ESSENTIAL HYPERTENSION: ICD-10-CM

## 2020-12-28 DIAGNOSIS — E78.2 MIXED HYPERLIPIDEMIA: ICD-10-CM

## 2020-12-28 PROBLEM — K29.80 DUODENITIS: Status: RESOLVED | Noted: 2018-12-03 | Resolved: 2020-12-28

## 2020-12-28 PROCEDURE — 99204 OFFICE O/P NEW MOD 45 MIN: CPT | Performed by: INTERNAL MEDICINE

## 2020-12-28 PROCEDURE — 3008F BODY MASS INDEX DOCD: CPT | Performed by: INTERNAL MEDICINE

## 2020-12-28 PROCEDURE — 3075F SYST BP GE 130 - 139MM HG: CPT | Performed by: INTERNAL MEDICINE

## 2020-12-28 PROCEDURE — 1036F TOBACCO NON-USER: CPT | Performed by: INTERNAL MEDICINE

## 2020-12-28 PROCEDURE — 3725F SCREEN DEPRESSION PERFORMED: CPT | Performed by: INTERNAL MEDICINE

## 2020-12-28 PROCEDURE — 3079F DIAST BP 80-89 MM HG: CPT | Performed by: INTERNAL MEDICINE

## 2020-12-29 ENCOUNTER — APPOINTMENT (OUTPATIENT)
Dept: RADIOLOGY | Age: 55
End: 2020-12-29
Payer: COMMERCIAL

## 2020-12-29 ENCOUNTER — TELEPHONE (OUTPATIENT)
Dept: ENDOCRINOLOGY | Facility: CLINIC | Age: 55
End: 2020-12-29

## 2020-12-29 DIAGNOSIS — M25.562 CHRONIC PAIN OF LEFT KNEE: ICD-10-CM

## 2020-12-29 DIAGNOSIS — G89.29 CHRONIC PAIN OF LEFT KNEE: ICD-10-CM

## 2020-12-29 PROCEDURE — 73562 X-RAY EXAM OF KNEE 3: CPT

## 2020-12-30 ENCOUNTER — TELEPHONE (OUTPATIENT)
Dept: ENDOCRINOLOGY | Facility: CLINIC | Age: 55
End: 2020-12-30

## 2021-01-04 ENCOUNTER — TELEPHONE (OUTPATIENT)
Dept: INTERNAL MEDICINE CLINIC | Facility: CLINIC | Age: 56
End: 2021-01-04

## 2021-01-04 NOTE — TELEPHONE ENCOUNTER
Called and gave patient results    Told him if low-impact and topical clean cream is not working he needs to see orthopedic

## 2021-01-04 NOTE — TELEPHONE ENCOUNTER
----- Message from Jossy Cowan DO sent at 1/4/2021  4:22 PM EST -----  Knee x-ray is back and is normal/no signs of arthritis    If low impact exercise & diclofenac topical gel does not help -> recommend Nixon see [facility]'s orthopedics

## 2021-01-06 NOTE — TELEPHONE ENCOUNTER
Berto Mcclure ([facility])  Hello- We have spoken with the patient and confirmed the order  Documents are being reviewed at this time  As soon as the documents are reviewed, the order will ship  Thank you and have a great day!

## 2021-01-12 ENCOUNTER — TELEPHONE (OUTPATIENT)
Dept: ENDOCRINOLOGY | Facility: CLINIC | Age: 56
End: 2021-01-12

## 2021-01-12 DIAGNOSIS — Z79.4 TYPE 2 DIABETES MELLITUS WITH HYPERGLYCEMIA, WITH LONG-TERM CURRENT USE OF INSULIN (HCC): Primary | ICD-10-CM

## 2021-01-12 DIAGNOSIS — E11.65 TYPE 2 DIABETES MELLITUS WITH HYPERGLYCEMIA, WITH LONG-TERM CURRENT USE OF INSULIN (HCC): Primary | ICD-10-CM

## 2021-01-12 NOTE — TELEPHONE ENCOUNTER
Pt called received dexcom and would like to be set up for training    Spoke with pt aware education will be reaching out to schedule

## 2021-01-13 LAB
LEFT EYE DIABETIC RETINOPATHY: NORMAL
RIGHT EYE DIABETIC RETINOPATHY: NORMAL

## 2021-01-14 NOTE — TELEPHONE ENCOUNTER
Pt is scheduled for 1-25-21 w/ Jonathan Anthony  Please have Dr Bonnie Trevizo put in a referral for his Dexcom training  Thanx

## 2021-01-25 ENCOUNTER — TELEPHONE (OUTPATIENT)
Dept: ENDOCRINOLOGY | Facility: CLINIC | Age: 56
End: 2021-01-25

## 2021-01-25 ENCOUNTER — OFFICE VISIT (OUTPATIENT)
Dept: DIABETES SERVICES | Facility: CLINIC | Age: 56
End: 2021-01-25
Payer: COMMERCIAL

## 2021-01-25 DIAGNOSIS — E11.65 TYPE 2 DIABETES MELLITUS WITH HYPERGLYCEMIA, WITH LONG-TERM CURRENT USE OF INSULIN (HCC): ICD-10-CM

## 2021-01-25 DIAGNOSIS — Z79.4 TYPE 2 DIABETES MELLITUS WITH HYPERGLYCEMIA, WITH LONG-TERM CURRENT USE OF INSULIN (HCC): ICD-10-CM

## 2021-01-25 PROCEDURE — 95249 CONT GLUC MNTR PT PROV EQP: CPT

## 2021-01-25 NOTE — TELEPHONE ENCOUNTER
Pt lm questioning if we sent refills for dexcom supplies to [facility]  Jazmine () reached out to [facility] awaiting response  Lm on pt vm with same info

## 2021-01-25 NOTE — PROGRESS NOTES
Personal CGM Initiation    Met with Zaira Pineda, 60 -yr old patient, here today for initiation of Personal Continuous Glucose Monitoring:DexCom G6  Stated goal for CGM expressed by parent/patient: more information  The patient/family brings all necessary equipment, fully charged?: Yes     The patient/family was assisted with set-up of the device  High alert: 180 Snooze time: 0 minutes  Low alert: 80 Snooze time: 15 minutes  Other alert: low alert soon   Alerts set? : Yes    The patients skin was prepped with alcohol  The patient/family observed the educator perform sensor insertion into the patient's: Yes  The patient/family was assisted in performing sensor insertion into the patients yes: Yes  The patient tolerance of the procedure: well tolerated  Sensor connectivity was confirmed?: Yes    Topics discussed included:   difference between sensor and meter glucose   when fingerstick confirmation is advisable   system set-up   sensor insertion and start   reading graphs and trend information   troubleshooting alarms    activities of daily living: shower, swimming, travel, CT/MRI  downloading appropriate trenton(s) for downloading/sharing  sensor removal, disposal    He will be scheduled for a follow for download and report review for personal CGM follow-up

## 2021-01-29 ENCOUNTER — TELEPHONE (OUTPATIENT)
Dept: ENDOCRINOLOGY | Facility: CLINIC | Age: 56
End: 2021-01-29

## 2021-01-29 NOTE — TELEPHONE ENCOUNTER
Please let him know that we have the report  It looks like he started using it in the past few days, and everything is working correctly  Blood sugars are looking good   Thank you

## 2021-02-08 ENCOUNTER — OFFICE VISIT (OUTPATIENT)
Dept: DIABETES SERVICES | Facility: CLINIC | Age: 56
End: 2021-02-08

## 2021-02-08 DIAGNOSIS — E11.9 DIABETES MELLITUS WITHOUT COMPLICATION (HCC): Primary | ICD-10-CM

## 2021-02-08 NOTE — PROGRESS NOTES
He is seen today for download of his DexCom report  I reviewed his report with him   He has made some changes in his breakfast choice  , his cereal was elevating his glucose quickly and he has changed to oatmeal in the morning  He finds the DexCom information useful in seeing how glucose can be affected by food, stress  He is connected to the office in [facility]  Download was given to his PCP for review

## 2021-03-03 ENCOUNTER — TELEPHONE (OUTPATIENT)
Dept: ENDOCRINOLOGY | Facility: CLINIC | Age: 56
End: 2021-03-03

## 2021-03-03 NOTE — TELEPHONE ENCOUNTER
A new detailed written order for continuous CGM was fax to [facility] @ [phone]

## 2021-03-10 DIAGNOSIS — Z23 ENCOUNTER FOR IMMUNIZATION: ICD-10-CM

## 2021-03-19 ENCOUNTER — TELEPHONE (OUTPATIENT)
Dept: INTERNAL MEDICINE CLINIC | Facility: CLINIC | Age: 56
End: 2021-03-19

## 2021-03-19 ENCOUNTER — APPOINTMENT (OUTPATIENT)
Dept: LAB | Facility: HOSPITAL | Age: 56
End: 2021-03-19
Payer: COMMERCIAL

## 2021-03-19 DIAGNOSIS — E78.2 MIXED HYPERLIPIDEMIA: Primary | ICD-10-CM

## 2021-03-19 DIAGNOSIS — Z79.4 TYPE 2 DIABETES MELLITUS WITH HYPERGLYCEMIA, WITH LONG-TERM CURRENT USE OF INSULIN (HCC): ICD-10-CM

## 2021-03-19 DIAGNOSIS — E11.65 TYPE 2 DIABETES MELLITUS WITH HYPERGLYCEMIA, WITH LONG-TERM CURRENT USE OF INSULIN (HCC): ICD-10-CM

## 2021-03-19 DIAGNOSIS — E78.2 MIXED HYPERLIPIDEMIA: ICD-10-CM

## 2021-03-19 LAB
ANION GAP SERPL CALCULATED.3IONS-SCNC: 4 MMOL/L (ref 4–13)
BUN SERPL-MCNC: 12 MG/DL (ref 5–25)
CALCIUM SERPL-MCNC: 9.1 MG/DL (ref 8.3–10.1)
CHLORIDE SERPL-SCNC: 99 MMOL/L (ref 100–108)
CHOLEST SERPL-MCNC: 199 MG/DL (ref 50–200)
CO2 SERPL-SCNC: 28 MMOL/L (ref 21–32)
CREAT SERPL-MCNC: 0.86 MG/DL (ref 0.6–1.3)
EST. AVERAGE GLUCOSE BLD GHB EST-MCNC: 169 MG/DL
GFR SERPL CREATININE-BSD FRML MDRD: 96 ML/MIN/1.73SQ M
GLUCOSE P FAST SERPL-MCNC: 141 MG/DL (ref 65–99)
HBA1C MFR BLD: 7.5 %
HDLC SERPL-MCNC: 58 MG/DL
LDLC SERPL CALC-MCNC: 118 MG/DL (ref 0–100)
POTASSIUM SERPL-SCNC: 4.2 MMOL/L (ref 3.5–5.3)
SODIUM SERPL-SCNC: 131 MMOL/L (ref 136–145)
TRIGL SERPL-MCNC: 114 MG/DL

## 2021-03-19 PROCEDURE — 36415 COLL VENOUS BLD VENIPUNCTURE: CPT

## 2021-03-19 PROCEDURE — 83036 HEMOGLOBIN GLYCOSYLATED A1C: CPT

## 2021-03-19 PROCEDURE — 80048 BASIC METABOLIC PNL TOTAL CA: CPT

## 2021-03-19 PROCEDURE — 3051F HG A1C>EQUAL 7.0%<8.0%: CPT | Performed by: INTERNAL MEDICINE

## 2021-03-19 PROCEDURE — 80061 LIPID PANEL: CPT

## 2021-03-19 RX ORDER — ROSUVASTATIN CALCIUM 10 MG/1
10 TABLET, COATED ORAL DAILY
Qty: 30 TABLET | Refills: 5 | Status: SHIPPED | OUTPATIENT
Start: 2021-03-19 | End: 2021-09-03 | Stop reason: SDUPTHER

## 2021-03-19 NOTE — TELEPHONE ENCOUNTER
PT CALLED BACK, AND IS WILLING TO MAKE A CHANGE TO THE MEDICATION  HE SAID HE WILL USE ANY OF THE TWO  MEDICATION YOU WILL PRESCRIBE

## 2021-03-19 NOTE — TELEPHONE ENCOUNTER
Rosuvastatin 10mg once a day ordered to [facility] pharmacy    Let's check liver enzymes and LDL level with next round of BW(can wait 2-3 months)    Labs ordered    Thank you

## 2021-03-19 NOTE — TELEPHONE ENCOUNTER
----- Message from Eliana Side, DO sent at 3/19/2021 10:44 AM EDT -----  Cholesterol BW is back & LDL remains high/elevated  [facility] will benefit from changing statin to another, more effective statin such as atorvastatin 20mg or rosuvastatin 10mg per day    If he would like to make the change, let me know

## 2021-03-22 ENCOUNTER — TELEPHONE (OUTPATIENT)
Dept: ENDOCRINOLOGY | Facility: CLINIC | Age: 56
End: 2021-03-22

## 2021-03-22 NOTE — TELEPHONE ENCOUNTER
----- Message from Mc Johnson MD sent at 3/22/2021  7:58 AM EDT -----  Pls call patient regarding results: no urgent lab results,results will be discussed during appointment in few days

## 2021-03-30 ENCOUNTER — OFFICE VISIT (OUTPATIENT)
Dept: ENDOCRINOLOGY | Facility: CLINIC | Age: 56
End: 2021-03-30
Payer: COMMERCIAL

## 2021-03-30 VITALS
HEIGHT: 70 IN | DIASTOLIC BLOOD PRESSURE: 88 MMHG | SYSTOLIC BLOOD PRESSURE: 140 MMHG | BODY MASS INDEX: 23.62 KG/M2 | HEART RATE: 62 BPM | WEIGHT: 165 LBS

## 2021-03-30 DIAGNOSIS — Z79.4 TYPE 2 DIABETES MELLITUS WITH HYPERGLYCEMIA, WITH LONG-TERM CURRENT USE OF INSULIN (HCC): ICD-10-CM

## 2021-03-30 DIAGNOSIS — Z79.4 CURRENT USE OF INSULIN (HCC): Primary | ICD-10-CM

## 2021-03-30 DIAGNOSIS — E11.65 TYPE 2 DIABETES MELLITUS WITH HYPERGLYCEMIA, WITH LONG-TERM CURRENT USE OF INSULIN (HCC): ICD-10-CM

## 2021-03-30 DIAGNOSIS — K85.00 IDIOPATHIC ACUTE PANCREATITIS WITHOUT INFECTION OR NECROSIS: ICD-10-CM

## 2021-03-30 PROCEDURE — 99213 OFFICE O/P EST LOW 20 MIN: CPT | Performed by: INTERNAL MEDICINE

## 2021-03-30 PROCEDURE — 3008F BODY MASS INDEX DOCD: CPT | Performed by: INTERNAL MEDICINE

## 2021-03-30 PROCEDURE — 3079F DIAST BP 80-89 MM HG: CPT | Performed by: INTERNAL MEDICINE

## 2021-03-30 PROCEDURE — 3077F SYST BP >= 140 MM HG: CPT | Performed by: INTERNAL MEDICINE

## 2021-03-30 PROCEDURE — 1036F TOBACCO NON-USER: CPT | Performed by: INTERNAL MEDICINE

## 2021-03-30 PROCEDURE — 95251 CONT GLUC MNTR ANALYSIS I&R: CPT | Performed by: INTERNAL MEDICINE

## 2021-03-30 NOTE — PROGRESS NOTES
Established Patient Progress Note      Chief Complaint   Patient presents with    Diabetes Mellitus      Referring Provider  Billy Babinski, Do  [address] [facility],  [address] [place] [address]     History of Present Illness:   Raisa Joyner is a 60 y o  male with a history of type 2 diabetes  last seen in the office in  Dec 2020  Since the last visit, he started using the Dexcom G6, which he finds very useful    Diabetes hx: He was dx with T2DM in 2002, but started insulin in 2016  He has been seen by GI for idiopathic pancreatitis  He recalls one discrete episode of pancreatitis, but also several times when he had back pain       He was taking Janumet and glimepiride, but was changed to Novolog 70/30 after the discovery of pancreatitis  Also, 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  He follows closely with [facility]  For maintenance, he gets Rituximab and methotrexate  There are no changes to his plan, but will se Baker this week      He works as an , and follows a very good diet  Current regimen: Novolog 70/30 6units fb and dinner and metformin 1g twice daily    Eyes: Dec 2020; also monitoring for glaucoma  Pod: self care     He does take lisinopril and simvastatin without issues    [address] used   Home use     Indication   Type 2 Diabetes    More than 72 hours of data was reviewed  Report to be scanned to chart  Date Range:     Analysis of data:   Average Glucose: 145  Coefficient of Variation: n/a  SD : 22mg/dL  Time in Target Range: 92%  Time Above Range: 8%  Time Below Range: 0     Interpretation of data:   Overall excellent use of this  Few highest Bgs over 200 and no lows  He does describe a prolonged hypoglycemic event after the heavy Jan 2021 snowstorm  He did exert himself shoveling the snow and the sensor did alarm           Patient Active Problem List   Diagnosis    Gastroesophageal reflux disease without esophagitis    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[address] )    Idiopathic acute pancreatitis without infection or necrosis    Mixed hyperlipidemia    Type 2 diabetes mellitus with hyperglycemia, with long-term current use of insulin (HCC)    Essential hypertension    Current use of insulin (HCC)      Past Medical History:   Diagnosis Date    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[place] [address] )     Diabetes mellitus ([place] [address] )     blood sugar 121 @ 0600    GERD (gastroesophageal reflux disease)     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[address] )     Sarcoid       Past Surgical History:   Procedure Laterality Date    COLONOSCOPY      ESOPHAGOGASTRODUODENOSCOPY N/A 2019    Procedure: ESOPHAGOGASTRODUODENOSCOPY (EGD); Surgeon: Sheila Bustamante MD;  Location: [facility] GI LAB; Service: Gastroenterology    LYMPH NODE BIOPSY      LYMPH NODE DISSECTION  , left axilla    benign on pathology    OPTIC NERVE DECOMPRESSION      [place] ESOPHAGOGASTRODUODENOSCOPY TRANSORAL DIAGNOSTIC N/A 2017    Procedure: EGD AND COLONOSCOPY;  Surgeon: Sheila Bustamante MD;  Location:  GI LAB;   Service: Gastroenterology    SINUS SURGERY      SINUS SURGERY      WISDOM TOOTH EXTRACTION        Family History   Problem Relation Age of Onset    Alzheimer's disease Mother     Hypertension Mother     Osteoporosis Mother     Hypertension Father     Heart attack Father     Osteoporosis Sister     Diabetes Brother     Diabetes type II Brother     Hyperthyroidism Brother     Diabetes type II Paternal Aunt      Social History     Tobacco Use    Smoking status: Former Smoker     Packs/day: 0 75     Years: 20 00     Pack years: 15 00     Types: Cigarettes     Quit date:      Years since quittin 2    Smokeless tobacco: Never Used   Substance Use Topics    Alcohol use: Yes     Comment: rarely     Allergies   Allergen Reactions    Ceclor [Cefaclor] Hives     Tolerate amoxicillin         Current Outpatient Medications:     Ascorbic Acid (VITAMIN C) 1000 MG tablet, Take 1,000 mg by mouth daily, Disp: , Rfl:     b complex vitamins tablet, Take 1 tablet by mouth daily, Disp: , Rfl:     B-D ULTRAFINE III SHORT PEN 31G X 8 MM MISC, 2 (two) times a day Use as directed, Disp: , Rfl:     Cyanocobalamin (VITAMIN B12 PO), Take 1 tablet by mouth daily, Disp: , Rfl:     Diclofenac Sodium (VOLTAREN) 1 %, Apply 2 g topically 2 (two) times a day as needed (knee pain/OA), Disp: 1 Tube, Rfl: 0    famotidine (PEPCID) 40 MG tablet, TAKE 1 TABLET (40 MG TOTAL) BY MOUTH DAILY (Patient taking differently: 20 mg ), Disp: 30 tablet, Rfl: 6    FLUTICASONE PROPIONATE, INHAL, IN, Inhale 2 sprays 2 (two) times a day, Disp: , Rfl:     folic acid (FOLVITE) 1 mg tablet, Take 1 mg by mouth daily, Disp: , Rfl:     glucose blood test strip, Check BG 4x daily, Disp: 300 each, Rfl: 1    latanoprost (XALATAN) 0 005 % ophthalmic solution, 1 drop daily at bedtime, Disp: , Rfl:     lisinopril (ZESTRIL) 20 mg tablet, Take 20 mg by mouth daily, Disp: , Rfl:     metFORMIN (GLUCOPHAGE) 1000 MG tablet, Take 1 tablet by mouth 2 (two) times a day , Disp: , Rfl:     Methotrexate Sodium (METHOTREXATE PO), Take 6 tablets by mouth once a week , Disp: , Rfl:     Multiple Vitamins-Minerals (PRESERVISION AREDS PO), Take 1 tablet by mouth daily, Disp: , Rfl:     multivitamin (THERAGRAN) TABS, Take 1 tablet by mouth daily, Disp: , Rfl:     mupirocin (BACTROBAN) 2 % ointment, DILUTE 1 INCH OF MUPIROCIN OINT IN 8 OZ  OF SALINE IRRIGATE EACH NOSTRIL WITH 4 OZ   TWICE A DAY, Disp: , Rfl:     NovoLOG Mix 70/30 FlexPen (70-30) 100 units/mL injection pen, 6 Units 2 (two) times a day, Disp: , Rfl:     Omega-3 Fatty Acids (FISH OIL PO), Take 1 capsule by mouth daily, Disp: , Rfl:     riTUXimab (RITUXAN) injection, Infuse into a venous catheter , Disp: , Rfl:     rosuvastatin (CRESTOR) 10 MG tablet, Take 1 tablet (10 mg total) by mouth daily, Disp: 30 tablet, Rfl: 5    Vitamin D, Cholecalciferol, 1000 units CAPS, Take 1 tablet by mouth 2 (two) times a day , Disp: , Rfl:   Review of Systems   Constitutional: Negative for unexpected weight change  HENT: Negative for hearing loss and voice change  Eyes: Negative for visual disturbance  Respiratory: Negative for shortness of breath  Gastrointestinal: Negative for constipation and diarrhea  Endocrine: Negative for polydipsia and polyuria  Neurological: Negative for tremors  Physical Exam:  Body mass index is 23 68 kg/m²  /88   Pulse 62   Ht 5' 10" (1 778 m)   Wt 74 8 kg (165 lb)   BMI 23 68 kg/m²    Wt Readings from Last 3 Encounters:   03/30/21 74 8 kg (165 lb)   12/28/20 77 9 kg (171 lb 12 8 oz)   12/15/20 77 8 kg (171 lb 8 oz)         Physical Exam   Gen: appears well-developed and well-nourished  No apparent distress  Head: Normocephalic and atraumatic  Eyes: no stare or proptosis, no periorbital edema  E/N/M nl facies, hearing grossly intact  Neck: range of motion nl  Pulmonary/Chest: breathing  comfortably, no accessory muscle use, effort normal    Musculoskeletal: moves all 4 extremities, gait nl  Neurological: alert and oriented to person, place, and time   No upper ext tremor appreciated  Skin: does not appear diaphoretic, no facial plethora  Psychiatric: normal mood and affect; behavior is normal; no gross lapses in memory, answer questions appropriately      Labs:     Lab Results   Component Value Date    HGBA1C 7 5 (H) 03/19/2021         Lab Results   Component Value Date    CREATININE 0 86 03/19/2021    CREATININE 0 86 10/11/2020    CREATININE 0 76 06/19/2020    BUN 12 03/19/2021    K 4 2 03/19/2021    CL 99 (L) 03/19/2021    CO2 28 03/19/2021     eGFR   Date Value Ref Range Status   03/19/2021 96 ml/min/1 73sq m Final     No components found for: [facility]    Lab Results   Component Value Date    HDL 58 03/19/2021    TRIG 114 03/19/2021       Lab Results   Component Value Date    ALT 58 10/11/2020    AST 23 10/11/2020    ALKPHOS 92 10/11/2020           Impression:  1  Current use of insulin ([facility] )    2  Idiopathic acute pancreatitis without infection or necrosis    3  Type 2 diabetes mellitus with hyperglycemia, with long-term current use of insulin ([facility] )           Plan:    Yehuda Eckert was seen today for diabetes mellitus  Diagnoses and all orders for this visit:    Current use of insulin ([facility] )  -     Hemoglobin A1C; Future  -     Basic metabolic panel Lab Collect; Future    Idiopathic acute pancreatitis without infection or necrosis    Type 2 diabetes mellitus with hyperglycemia, with long-term current use of insulin ([facility])  -     Hemoglobin A1C; Future  -     Basic metabolic panel Lab Collect; Future         1  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:  He is doing well on novolog 70/30 6units bf and dinner and metformin 1g twice daily  He is doing very well with the feedback from the [address] and will continue using this  His a1c likely still shows some effects from prior to starting the CGM  He will get a1c before his next visit in 4mos    Discussed with the patient and all questioned fully answered  He will call me if any problems arise      Counseled patient on diagnostic results, prognosis, risk and benefit of treatment options, instruction for management, importance of treatment compliance, Risk  factor reduction and impressions      Nila Roldan MD

## 2021-04-06 DIAGNOSIS — Z79.4 TYPE 2 DIABETES MELLITUS WITH HYPERGLYCEMIA, WITH LONG-TERM CURRENT USE OF INSULIN (HCC): Primary | ICD-10-CM

## 2021-04-06 DIAGNOSIS — E11.65 TYPE 2 DIABETES MELLITUS WITH HYPERGLYCEMIA, WITH LONG-TERM CURRENT USE OF INSULIN (HCC): Primary | ICD-10-CM

## 2021-04-12 DIAGNOSIS — E11.65 TYPE 2 DIABETES MELLITUS WITH HYPERGLYCEMIA, WITH LONG-TERM CURRENT USE OF INSULIN (HCC): Primary | ICD-10-CM

## 2021-04-12 DIAGNOSIS — Z79.4 TYPE 2 DIABETES MELLITUS WITH HYPERGLYCEMIA, WITH LONG-TERM CURRENT USE OF INSULIN (HCC): Primary | ICD-10-CM

## 2021-04-12 RX ORDER — PEN NEEDLE, DIABETIC 31 GX5/16"
NEEDLE, DISPOSABLE MISCELLANEOUS
Qty: 180 EACH | Refills: 1 | Status: SHIPPED | OUTPATIENT
Start: 2021-04-12 | End: 2021-09-03 | Stop reason: SDUPTHER

## 2021-06-02 ENCOUNTER — OFFICE VISIT (OUTPATIENT)
Dept: INTERNAL MEDICINE CLINIC | Facility: CLINIC | Age: 56
End: 2021-06-02
Payer: COMMERCIAL

## 2021-06-02 VITALS
SYSTOLIC BLOOD PRESSURE: 122 MMHG | OXYGEN SATURATION: 98 % | BODY MASS INDEX: 23.88 KG/M2 | TEMPERATURE: 98.1 F | RESPIRATION RATE: 16 BRPM | HEIGHT: 70 IN | DIASTOLIC BLOOD PRESSURE: 78 MMHG | WEIGHT: 166.8 LBS | HEART RATE: 70 BPM

## 2021-06-02 DIAGNOSIS — E11.65 TYPE 2 DIABETES MELLITUS WITH HYPERGLYCEMIA, WITH LONG-TERM CURRENT USE OF INSULIN (HCC): ICD-10-CM

## 2021-06-02 DIAGNOSIS — E78.2 MIXED HYPERLIPIDEMIA: ICD-10-CM

## 2021-06-02 DIAGNOSIS — Z00.00 WELLNESS EXAMINATION: Primary | ICD-10-CM

## 2021-06-02 DIAGNOSIS — Z12.5 SCREENING FOR PROSTATE CANCER: ICD-10-CM

## 2021-06-02 DIAGNOSIS — I10 ESSENTIAL HYPERTENSION: ICD-10-CM

## 2021-06-02 DIAGNOSIS — Z79.4 TYPE 2 DIABETES MELLITUS WITH HYPERGLYCEMIA, WITH LONG-TERM CURRENT USE OF INSULIN (HCC): ICD-10-CM

## 2021-06-02 PROCEDURE — 1036F TOBACCO NON-USER: CPT | Performed by: INTERNAL MEDICINE

## 2021-06-02 PROCEDURE — 99396 PREV VISIT EST AGE 40-64: CPT | Performed by: INTERNAL MEDICINE

## 2021-06-02 PROCEDURE — 3078F DIAST BP <80 MM HG: CPT | Performed by: INTERNAL MEDICINE

## 2021-06-02 PROCEDURE — 3725F SCREEN DEPRESSION PERFORMED: CPT | Performed by: INTERNAL MEDICINE

## 2021-06-02 PROCEDURE — 3074F SYST BP LT 130 MM HG: CPT | Performed by: INTERNAL MEDICINE

## 2021-06-02 PROCEDURE — 4010F ACE/ARB THERAPY RXD/TAKEN: CPT | Performed by: INTERNAL MEDICINE

## 2021-06-02 PROCEDURE — 3008F BODY MASS INDEX DOCD: CPT | Performed by: INTERNAL MEDICINE

## 2021-06-02 RX ORDER — LISINOPRIL 20 MG/1
20 TABLET ORAL DAILY
Qty: 90 TABLET | Refills: 1 | Status: SHIPPED | OUTPATIENT
Start: 2021-06-02 | End: 2021-09-03 | Stop reason: SDUPTHER

## 2021-06-02 NOTE — PATIENT INSTRUCTIONS
1  Fasting Blood work end of the month  2  Medication refilled  3   Return every 12 months or sooner if questions

## 2021-06-02 NOTE — PROGRESS NOTES
Assessment/Plan:     Diagnoses and all orders for this visit:    Wellness examination    Essential hypertension  Comments:  BP doing well, c/w ACEI  rx re-ordered  Orders:  -     lisinopril (ZESTRIL) 20 mg tablet; Take 1 tablet (20 mg total) by mouth daily    Type 2 diabetes mellitus with hyperglycemia, with long-term current use of insulin (HCC)  Comments:  blood sugars doing better, c/w metformin and insulin and f/u endocrine    Mixed hyperlipidemia  Comments:  taking statin and no SE, c/w rx & check lipid profile  Orders:  -     Lipid Panel with Direct LDL reflex; Future  -     Hepatic function panel; Future    Screening for prostate cancer  Comments:  last in 2/2020 & no symptoms  PSA ordered per pt request  Orders:  -     PSA, Total Screen; Future          Subjective:      Patient ID: Flakito Perla is a 60 y o  male  HPI    Here for physical, taking rosuvastatin and no AE  Obtained CGM and is doing better with diabetes control  Has BW for endocrine at end of this month  Seeing Mya Amaya of [facility] rheumatology for care of IgG4-RD and idiopathic pancreatitis, sarcoidosis([facility])  He is exercising regularly and lost some weight on purpose  He is UTD on colonoscopy & COVID-19 vaccine  ROS Otherwise negative, no other complaints  Past Medical History:   Diagnosis Date    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[facility] [place] [address] )     Diabetes mellitus ([facility] [place] [address] )     blood sugar 121 @ 0600    GERD (gastroesophageal reflux disease)     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[facility] [place] [address] )     Sarcoid      Vitals:    06/02/21 1501   BP: 122/78   Pulse: 70   Resp: 16   Temp: 98 1 °F (36 7 °C)   SpO2: 98%   Weight: 75 7 kg (166 lb 12 8 oz)   Height: 5' 10" (1 778 m)     Body mass index is 23 93 kg/m²      Current Outpatient Medications:     Ascorbic Acid (VITAMIN C) 1000 MG tablet, Take 1,000 mg by mouth daily, Disp: , Rfl:     b complex vitamins tablet, Take 1 tablet by mouth daily, Disp: , Rfl:     B-D ULTRAFINE III SHORT PEN 31G X 8 MM MISC, Uses 2 times daily, Disp: 180 each, Rfl: 1    Cyanocobalamin (VITAMIN B12 PO), Take 1 tablet by mouth daily, Disp: , Rfl:     Diclofenac Sodium (VOLTAREN) 1 %, Apply 2 g topically 2 (two) times a day as needed (knee pain/OA), Disp: 1 Tube, Rfl: 0    famotidine (PEPCID) 40 MG tablet, TAKE 1 TABLET (40 MG TOTAL) BY MOUTH DAILY (Patient taking differently: 20 mg ), Disp: 30 tablet, Rfl: 6    FLUTICASONE PROPIONATE, INHAL, IN, Inhale 2 sprays 2 (two) times a day, Disp: , Rfl:     folic acid (FOLVITE) 1 mg tablet, Take 1 mg by mouth daily, Disp: , Rfl:     glucose blood test strip, Check BG 4x daily, Disp: 300 each, Rfl: 1    latanoprost (XALATAN) 0 005 % ophthalmic solution, 1 drop daily at bedtime, Disp: , Rfl:     lisinopril (ZESTRIL) 20 mg tablet, Take 1 tablet (20 mg total) by mouth daily, Disp: 90 tablet, Rfl: 1    metFORMIN (GLUCOPHAGE) 1000 MG tablet, Take 1 tablet (1,000 mg total) by mouth 2 (two) times a day, Disp: 60 tablet, Rfl: 11    Methotrexate Sodium (METHOTREXATE PO), Take 6 tablets by mouth once a week , Disp: , Rfl:     Multiple Vitamins-Minerals (PRESERVISION AREDS PO), Take 1 tablet by mouth daily, Disp: , Rfl:     multivitamin (THERAGRAN) TABS, Take 1 tablet by mouth daily, Disp: , Rfl:     mupirocin (BACTROBAN) 2 % ointment, DILUTE 1 INCH OF MUPIROCIN OINT IN 8 OZ  OF SALINE IRRIGATE EACH NOSTRIL WITH 4 OZ   TWICE A DAY, Disp: , Rfl:     NovoLOG Mix 70/30 FlexPen (70-30) 100 units/mL injection pen, 6 Units 2 (two) times a day, Disp: , Rfl:     Omega-3 Fatty Acids (FISH OIL PO), Take 1 capsule by mouth daily, Disp: , Rfl:     riTUXimab (RITUXAN) injection, Infuse into a venous catheter , Disp: , Rfl:     rosuvastatin (CRESTOR) 10 MG tablet, Take 1 tablet (10 mg total) by mouth daily, Disp: 30 tablet, Rfl: 5    Vitamin D, Cholecalciferol, 1000 units CAPS, Take 1 tablet by mouth 2 (two) times a day , Disp: , Rfl:   Allergies   Allergen Reactions    Ceclor [Cefaclor] Hives     Tolerate amoxicillin         Review of Systems   Constitutional: Negative for fever  HENT: Negative for congestion  Eyes: Negative for visual disturbance  Respiratory: Negative for shortness of breath  Cardiovascular: Negative for chest pain  Gastrointestinal: Negative for abdominal pain  Endocrine: Negative for polyuria  Genitourinary: Negative for difficulty urinating  Musculoskeletal: Negative for arthralgias  Skin: Negative for rash  Allergic/Immunologic: Positive for immunocompromised state (takes rituximab infusions and methotrexate)  Neurological: Negative for weakness  Psychiatric/Behavioral: Negative for dysphoric mood  Objective:      /78   Pulse 70   Temp 98 1 °F (36 7 °C)   Resp 16   Ht 5' 10" (1 778 m)   Wt 75 7 kg (166 lb 12 8 oz)   SpO2 98%   BMI 23 93 kg/m²          Physical Exam  Vitals signs reviewed  Constitutional:       Appearance: Normal appearance  HENT:      Head: Normocephalic and atraumatic  Cardiovascular:      Rate and Rhythm: Normal rate and regular rhythm  Heart sounds: No murmur  Pulmonary:      Effort: Pulmonary effort is normal       Breath sounds: No wheezing or rales  Abdominal:      General: Bowel sounds are normal       Palpations: Abdomen is soft  Tenderness: There is no abdominal tenderness  Musculoskeletal:      Right lower leg: No edema  Left lower leg: No edema  Neurological:      Mental Status: He is alert     Psychiatric:         Mood and Affect: Mood normal          Behavior: Behavior normal

## 2021-06-29 ENCOUNTER — APPOINTMENT (OUTPATIENT)
Dept: LAB | Facility: HOSPITAL | Age: 56
End: 2021-06-29
Payer: COMMERCIAL

## 2021-06-29 DIAGNOSIS — E78.2 MIXED HYPERLIPIDEMIA: ICD-10-CM

## 2021-06-29 DIAGNOSIS — Z79.4 TYPE 2 DIABETES MELLITUS WITH HYPERGLYCEMIA, WITH LONG-TERM CURRENT USE OF INSULIN (HCC): ICD-10-CM

## 2021-06-29 DIAGNOSIS — Z79.4 CURRENT USE OF INSULIN (HCC): ICD-10-CM

## 2021-06-29 DIAGNOSIS — E11.65 TYPE 2 DIABETES MELLITUS WITH HYPERGLYCEMIA, WITH LONG-TERM CURRENT USE OF INSULIN (HCC): ICD-10-CM

## 2021-06-29 DIAGNOSIS — Z12.5 SCREENING FOR PROSTATE CANCER: ICD-10-CM

## 2021-06-29 LAB
ALBUMIN SERPL BCP-MCNC: 3.8 G/DL (ref 3.5–5)
ALP SERPL-CCNC: 75 U/L (ref 46–116)
ALT SERPL W P-5'-P-CCNC: 41 U/L (ref 12–78)
ANION GAP SERPL CALCULATED.3IONS-SCNC: 4 MMOL/L (ref 4–13)
AST SERPL W P-5'-P-CCNC: 17 U/L (ref 5–45)
BILIRUB DIRECT SERPL-MCNC: 0.17 MG/DL (ref 0–0.2)
BILIRUB SERPL-MCNC: 0.53 MG/DL (ref 0.2–1)
BUN SERPL-MCNC: 14 MG/DL (ref 5–25)
CALCIUM SERPL-MCNC: 9 MG/DL (ref 8.3–10.1)
CHLORIDE SERPL-SCNC: 97 MMOL/L (ref 100–108)
CHOLEST SERPL-MCNC: 164 MG/DL (ref 50–200)
CO2 SERPL-SCNC: 26 MMOL/L (ref 21–32)
CREAT SERPL-MCNC: 0.77 MG/DL (ref 0.6–1.3)
EST. AVERAGE GLUCOSE BLD GHB EST-MCNC: 151 MG/DL
GFR SERPL CREATININE-BSD FRML MDRD: 101 ML/MIN/1.73SQ M
GLUCOSE P FAST SERPL-MCNC: 125 MG/DL (ref 65–99)
HBA1C MFR BLD: 6.9 %
HDLC SERPL-MCNC: 54 MG/DL
LDLC SERPL CALC-MCNC: 85 MG/DL (ref 0–100)
LDLC SERPL DIRECT ASSAY-MCNC: 94 MG/DL (ref 0–100)
POTASSIUM SERPL-SCNC: 4.7 MMOL/L (ref 3.5–5.3)
PROT SERPL-MCNC: 7.2 G/DL (ref 6.4–8.2)
PSA SERPL-MCNC: 1.2 NG/ML (ref 0–4)
SODIUM SERPL-SCNC: 127 MMOL/L (ref 136–145)
TRIGL SERPL-MCNC: 127 MG/DL

## 2021-06-29 PROCEDURE — 3044F HG A1C LEVEL LT 7.0%: CPT | Performed by: INTERNAL MEDICINE

## 2021-06-29 PROCEDURE — G0103 PSA SCREENING: HCPCS

## 2021-06-29 PROCEDURE — 80061 LIPID PANEL: CPT

## 2021-06-29 PROCEDURE — 83721 ASSAY OF BLOOD LIPOPROTEIN: CPT

## 2021-06-29 PROCEDURE — 80048 BASIC METABOLIC PNL TOTAL CA: CPT

## 2021-06-29 PROCEDURE — 36415 COLL VENOUS BLD VENIPUNCTURE: CPT

## 2021-06-29 PROCEDURE — 83036 HEMOGLOBIN GLYCOSYLATED A1C: CPT

## 2021-06-29 PROCEDURE — 80076 HEPATIC FUNCTION PANEL: CPT

## 2021-07-06 ENCOUNTER — TELEPHONE (OUTPATIENT)
Dept: INTERNAL MEDICINE CLINIC | Facility: CLINIC | Age: 56
End: 2021-07-06

## 2021-07-06 NOTE — TELEPHONE ENCOUNTER
Called and spoke to patient about results patient had no question he will follow-up with the specialist

## 2021-07-06 NOTE — TELEPHONE ENCOUNTER
----- Message from Mar Daley DO sent at 7/2/2021  5:26 PM EDT -----  BW I ordered is back  Cholesterol, liver enzymes and PSA for prostate look good    I noticed his sodium level is low on BW ordered by specialist  Joanna should contact the ordering specialist to follow up

## 2021-08-05 ENCOUNTER — OFFICE VISIT (OUTPATIENT)
Dept: ENDOCRINOLOGY | Facility: CLINIC | Age: 56
End: 2021-08-05
Payer: COMMERCIAL

## 2021-08-05 VITALS
BODY MASS INDEX: 23.64 KG/M2 | WEIGHT: 165.13 LBS | HEART RATE: 74 BPM | SYSTOLIC BLOOD PRESSURE: 132 MMHG | DIASTOLIC BLOOD PRESSURE: 80 MMHG | HEIGHT: 70 IN

## 2021-08-05 DIAGNOSIS — Z79.4 TYPE 2 DIABETES MELLITUS WITH HYPERGLYCEMIA, WITH LONG-TERM CURRENT USE OF INSULIN (HCC): ICD-10-CM

## 2021-08-05 DIAGNOSIS — E11.65 TYPE 2 DIABETES MELLITUS WITH HYPERGLYCEMIA, WITH LONG-TERM CURRENT USE OF INSULIN (HCC): ICD-10-CM

## 2021-08-05 PROCEDURE — 1036F TOBACCO NON-USER: CPT | Performed by: INTERNAL MEDICINE

## 2021-08-05 PROCEDURE — 3079F DIAST BP 80-89 MM HG: CPT | Performed by: INTERNAL MEDICINE

## 2021-08-05 PROCEDURE — 99214 OFFICE O/P EST MOD 30 MIN: CPT | Performed by: INTERNAL MEDICINE

## 2021-08-05 PROCEDURE — 95251 CONT GLUC MNTR ANALYSIS I&R: CPT | Performed by: INTERNAL MEDICINE

## 2021-08-05 PROCEDURE — 3008F BODY MASS INDEX DOCD: CPT | Performed by: INTERNAL MEDICINE

## 2021-08-05 PROCEDURE — 3075F SYST BP GE 130 - 139MM HG: CPT | Performed by: INTERNAL MEDICINE

## 2021-08-05 NOTE — PROGRESS NOTES
Panda Pulido 60 y o  male MRN: [id number]    Encounter: [id number]                                             Established pt progress note     Impression:  -DM type 2 with current use of insulin  -IgG4  Autoimmune Pancreatitis  -Type 2 DM with long term insulin use      Assessment: This is a 60y o -year-old male with pmhx of DM type 2 on long term insulin with no complications,HLD history of Idiopatic pancreatitis and IgG4 disease    Plan:    -Will send a BMP ( noticed Hyperkalemia and Hyponatremia in previous BMP)  - Will order routine TSH level  -Will order Cortisol AM ( due to Hyperkalemia and Hyponatremia) although it is unlikely, pt does not have clinical symptoms or signs of Adrenal insufficiency   -Will get Hgb1c in 3 months      CC: Diabetes Type 2      HPI:   61 yo M with pmhx of DM type 2 ( diagnosed in 0527) with no complications on long term insulin since 2016,  HLD  history of IgG4 pseudotumor and idiopathic Pancreatitis, who presents to the clinic for a follow up in DM type 2 Management  Pt denies any symptoms associated with DM type 2 such as polyuria, polydipsia, polyphagia, Numbness/tinling in extremities and reports that since last visit has been feeling well,  Pt states that he has been complaint with Home medications and that his Dexcom has helped him to have a better glucose control    Medical conditions: dm type 2, IgG 4 pancreatitis, HTN, HLD  Family history : DM type 2 ( father, brother, aunt)  Opthamology: last seen 2 months ago  Podiatry: Never seen  Dental: Yes, pt had hx of infection   Pancreatitis: Yes   hospitalized for blood sugars: Never   Home blood glucose monitoring: pt has a Dexcom:  : average glucose : 145 with 93 % in range  Other Medical conditions influencing disease:No    Diet: chicken, veggies, fruits, cereal  diabetic diet compliance:  Yes  Activity: exercise daily  Walks 1 mile      Current regimen: Metformin 1000 mg BID and Novolog mix 70/30 6 Units BID  Hypoglycemic episodes: Denies   H/o of hypoglycemia causing hospitalization or Intervention such as glucagon injection  or ambulance call : Denies  Hypoglycemia symptoms: none   Diabetes education: No      on ACE inhibitor/ARB: Yes, Lisinopril  On statins: Yes, Crestor  Medication complaince:Yes  Side effect of medications:Denies  Device used: Dexcom  Home use   Professional Use- Physician Provided Equipment    Indication   Type 2 Diabetes     More than 72 hours of data was reviewed  Report to be scanned to chart  Date Range: July/23/2021 - aug/5/2021    Analysis of data:   Average Glucose: 145  Coefficient of Variation: 16 3%  SD : 24 mg/dl  Time in Target Range: 93%  Time Above Range: 7%  Time Below Range: 0%    Interpretation of data: Overall excellent use of this, no episodes of Hypoglycemia and few episodes of glucose above 180     Review of Systems   Constitutional: Negative for activity change and appetite change  HENT: Negative for congestion and facial swelling  Eyes: Positive for redness  Negative for pain and discharge  Respiratory: Negative for choking, chest tightness and shortness of breath  Cardiovascular: Negative for chest pain and palpitations  Gastrointestinal: Negative for abdominal distention and abdominal pain  Endocrine: Negative for polydipsia, polyphagia and polyuria  Musculoskeletal: Negative for arthralgias and joint swelling  Neurological: Negative for dizziness, seizures and numbness  Psychiatric/Behavioral: Negative for agitation, behavioral problems and sleep disturbance  All other systems reviewed and are negative        Historical Information   Past Medical History:   Diagnosis Date    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[facility] )     Diabetes mellitus ([place] [address] )     blood sugar 121 @ 0600    GERD (gastroesophageal reflux disease)     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[place] [address] )     Sarcoid      Past Surgical History:   Procedure Laterality Date    COLONOSCOPY      ESOPHAGOGASTRODUODENOSCOPY N/A 2019    Procedure: ESOPHAGOGASTRODUODENOSCOPY (EGD); Surgeon: Opal Cooks, MD;  Location: [facility]; Service: Gastroenterology    LYMPH NODE BIOPSY      LYMPH NODE DISSECTION  2009, left axilla    benign on pathology    OPTIC NERVE DECOMPRESSION      [place] ESOPHAGOGASTRODUODENOSCOPY TRANSORAL DIAGNOSTIC N/A 2017    Procedure: EGD AND COLONOSCOPY;  Surgeon: Opal Cooks, MD;  Location:  GI LAB;   Service: Gastroenterology    SINUS SURGERY      SINUS SURGERY      WISDOM TOOTH EXTRACTION       Social History   Social History     Substance and Sexual Activity   Alcohol Use Not Currently    Comment: rarely     Social History     Substance and Sexual Activity   Drug Use No     Social History     Tobacco Use   Smoking Status Former Smoker    Packs/day: 0 75    Years: 20 00    Pack years: 15 00    Types: Cigarettes    Quit date:     Years since quittin 5   Smokeless Tobacco Never Used     Family History:   Family History   Problem Relation Age of Onset    Alzheimer's disease Mother     Hypertension Mother     Osteoporosis Mother     Hypertension Father     Heart attack Father     Osteoporosis Sister     Diabetes Brother     Diabetes type II Brother     Hyperthyroidism Brother     Diabetes type II Paternal Aunt        Meds/Allergies   Current Outpatient Medications   Medication Sig Dispense Refill    Ascorbic Acid (VITAMIN C) 1000 MG tablet Take 1,000 mg by mouth daily      b complex vitamins tablet Take 1 tablet by mouth daily      B-D ULTRAFINE III SHORT PEN 31G X 8 MM MISC Uses 2 times daily 180 each 1    Cyanocobalamin (VITAMIN B12 PO) Take 1 tablet by mouth daily      famotidine (PEPCID) 40 MG tablet TAKE 1 TABLET (40 MG TOTAL) BY MOUTH DAILY (Patient taking differently: 20 mg ) 30 tablet 6    FLUTICASONE PROPIONATE, INHAL, IN Inhale 2 sprays 2 (two) times a day      folic acid (FOLVITE) 1 mg tablet Take 1 mg by mouth daily      glucose blood test strip Check BG 4x daily 300 each 1    latanoprost (XALATAN) 0 005 % ophthalmic solution 1 drop daily at bedtime      lisinopril (ZESTRIL) 20 mg tablet Take 1 tablet (20 mg total) by mouth daily 90 tablet 1    metFORMIN (GLUCOPHAGE) 1000 MG tablet Take 1 tablet (1,000 mg total) by mouth 2 (two) times a day 60 tablet 11    Methotrexate Sodium (METHOTREXATE PO) Take 2 5 mg by mouth once a week 6 tab weekly      Multiple Vitamins-Minerals (PRESERVISION AREDS PO) Take 1 tablet by mouth daily      multivitamin (THERAGRAN) TABS Take 1 tablet by mouth daily      NovoLOG Mix 70/30 FlexPen (70-30) 100 units/mL injection pen 6 Units 2 (two) times a day      Omega-3 Fatty Acids (FISH OIL PO) Take 1 capsule by mouth daily      riTUXimab (RITUXAN) injection Infuse into a venous catheter       rosuvastatin (CRESTOR) 10 MG tablet Take 1 tablet (10 mg total) by mouth daily 30 tablet 5    Vitamin D, Cholecalciferol, 1000 units CAPS Take 1 tablet by mouth 2 (two) times a day       Diclofenac Sodium (VOLTAREN) 1 % Apply 2 g topically 2 (two) times a day as needed (knee pain/OA) (Patient not taking: Reported on 8/5/2021) 1 Tube 0    mupirocin (BACTROBAN) 2 % ointment DILUTE 1 INCH OF MUPIROCIN OINT IN 8 OZ  OF SALINE IRRIGATE EACH NOSTRIL WITH 4 OZ  TWICE A DAY (Patient not taking: Reported on 8/5/2021)       No current facility-administered medications for this visit  Allergies   Allergen Reactions    Ceclor [Cefaclor] Hives     Tolerate amoxicillin       Objective   Vitals: Height 5' 10" (1 778 m), weight 74 9 kg (165 lb 2 oz)  Physical Exam  Constitutional:       Appearance: Normal appearance  He is not toxic-appearing or diaphoretic  HENT:      Head: Normocephalic and atraumatic  Nose: Nose normal  No congestion or rhinorrhea  Eyes:      Extraocular Movements: Extraocular movements intact        Conjunctiva/sclera: Conjunctivae normal       Pupils: Pupils are equal, round, and reactive to light  Cardiovascular:      Rate and Rhythm: Normal rate  Pulses:           Dorsalis pedis pulses are 2+ on the right side and 2+ on the left side  Heart sounds: No murmur heard  No friction rub  Pulmonary:      Breath sounds: No wheezing or rhonchi  Abdominal:      General: There is no distension  Tenderness: There is no abdominal tenderness  Musculoskeletal:         General: No swelling or deformity  Normal range of motion  Cervical back: Normal range of motion and neck supple  No rigidity or tenderness  Feet:      Right foot:      Skin integrity: No ulcer or dry skin  Left foot:      Skin integrity: No ulcer or dry skin  Neurological:      General: No focal deficit present  Mental Status: He is alert and oriented to person, place, and time  Motor: No weakness  Psychiatric:         Mood and Affect: Mood normal  Affect is not labile or flat  Behavior: Behavior normal          Thought Content: Thought content normal          Diabetic Foot Exam    Patient's shoes and socks removed  Right Foot/Ankle   Right Foot Inspection  Skin Exam: no dry skin, no pre-ulcer and no ulcer                            Sensory   Vibration: intact    Monofilament testing: intact  Vascular    The right DP pulse is 2+  Left Foot/Ankle  Left Foot Inspection  Skin Exam: no dry skin, no pre-ulcer and no ulcer                                         Sensory   Vibration: intact    Monofilament: intact  Vascular    The left DP pulse is 2+           Lab Results:   Lab Results   Component Value Date/Time    Hemoglobin A1C 6 9 (H) 06/29/2021 07:05 AM    Hemoglobin A1C 7 5 (H) 03/19/2021 07:13 AM    Hemoglobin A1C 8 4 (H) 10/11/2020 08:16 AM    WBC 7 64 10/11/2020 08:16 AM    Hemoglobin 15 5 10/11/2020 08:16 AM    Hematocrit 44 6 10/11/2020 08:16 AM    MCV 91 10/11/2020 08:16 AM    Platelets 709 57/30/5702 08:16 AM    BUN 14 06/29/2021 07:05 AM    BUN 12 03/19/2021 07:13 AM    BUN 13 10/11/2020 08:16 AM    Potassium 4 7 06/29/2021 07:05 AM    Potassium 4 2 03/19/2021 07:13 AM    Potassium 4 5 10/11/2020 08:16 AM    Chloride 97 (L) 06/29/2021 07:05 AM    Chloride 99 (L) 03/19/2021 07:13 AM    Chloride 99 (L) 10/11/2020 08:16 AM    CO2 26 06/29/2021 07:05 AM    CO2 28 03/19/2021 07:13 AM    CO2 29 10/11/2020 08:16 AM    Creatinine 0 77 06/29/2021 07:05 AM    Creatinine 0 86 03/19/2021 07:13 AM    Creatinine 0 86 10/11/2020 08:16 AM    AST 17 06/29/2021 07:05 AM    AST 23 10/11/2020 08:16 AM    ALT 41 06/29/2021 07:05 AM    ALT 58 10/11/2020 08:16 AM    Albumin 3 8 06/29/2021 07:05 AM    Albumin 4 5 10/11/2020 08:16 AM    HDL, Direct 54 06/29/2021 07:05 AM    HDL, Direct 58 03/19/2021 07:13 AM    HDL, Direct 61 10/11/2020 08:16 AM    Triglycerides 127 06/29/2021 07:05 AM    Triglycerides 114 03/19/2021 07:13 AM    Triglycerides 161 (H) 10/11/2020 08:16 AM         Portions of the record may have been created with voice recognition software  Occasional wrong word or "sound a like" substitutions may have occurred due to the inherent limitations of voice recognition software  Read the chart carefully and recognize, using context, where substitutions have occurred

## 2021-09-03 DIAGNOSIS — I10 ESSENTIAL HYPERTENSION: ICD-10-CM

## 2021-09-03 DIAGNOSIS — E78.2 MIXED HYPERLIPIDEMIA: ICD-10-CM

## 2021-09-03 PROCEDURE — 4010F ACE/ARB THERAPY RXD/TAKEN: CPT | Performed by: INTERNAL MEDICINE

## 2021-09-03 RX ORDER — ROSUVASTATIN CALCIUM 10 MG/1
10 TABLET, COATED ORAL DAILY
Qty: 90 TABLET | Refills: 1 | Status: SHIPPED | OUTPATIENT
Start: 2021-09-03 | End: 2022-03-11 | Stop reason: SDUPTHER

## 2021-09-03 RX ORDER — LISINOPRIL 20 MG/1
20 TABLET ORAL DAILY
Qty: 90 TABLET | Refills: 1 | Status: SHIPPED | OUTPATIENT
Start: 2021-09-03 | End: 2022-05-08 | Stop reason: SDUPTHER

## 2021-09-24 ENCOUNTER — APPOINTMENT (OUTPATIENT)
Dept: LAB | Facility: HOSPITAL | Age: 56
End: 2021-09-24
Payer: COMMERCIAL

## 2021-09-24 DIAGNOSIS — E11.65 TYPE 2 DIABETES MELLITUS WITH HYPERGLYCEMIA, WITH LONG-TERM CURRENT USE OF INSULIN (HCC): ICD-10-CM

## 2021-09-24 DIAGNOSIS — Z79.4 TYPE 2 DIABETES MELLITUS WITH HYPERGLYCEMIA, WITH LONG-TERM CURRENT USE OF INSULIN (HCC): ICD-10-CM

## 2021-09-24 LAB
ANION GAP SERPL CALCULATED.3IONS-SCNC: 4 MMOL/L (ref 4–13)
BUN SERPL-MCNC: 13 MG/DL (ref 5–25)
CALCIUM SERPL-MCNC: 9.1 MG/DL (ref 8.3–10.1)
CHLORIDE SERPL-SCNC: 98 MMOL/L (ref 100–108)
CO2 SERPL-SCNC: 29 MMOL/L (ref 21–32)
CORTIS AM PEAK SERPL-MCNC: 21.5 UG/DL (ref 4.2–22.4)
CREAT SERPL-MCNC: 0.91 MG/DL (ref 0.6–1.3)
EST. AVERAGE GLUCOSE BLD GHB EST-MCNC: 160 MG/DL
GFR SERPL CREATININE-BSD FRML MDRD: 93 ML/MIN/1.73SQ M
GLUCOSE P FAST SERPL-MCNC: 163 MG/DL (ref 65–99)
HBA1C MFR BLD: 7.2 %
POTASSIUM SERPL-SCNC: 5.1 MMOL/L (ref 3.5–5.3)
SODIUM SERPL-SCNC: 131 MMOL/L (ref 136–145)
TSH SERPL DL<=0.05 MIU/L-ACNC: 2.21 UIU/ML (ref 0.36–3.74)

## 2021-09-24 PROCEDURE — 84443 ASSAY THYROID STIM HORMONE: CPT

## 2021-09-24 PROCEDURE — 80048 BASIC METABOLIC PNL TOTAL CA: CPT

## 2021-09-24 PROCEDURE — 82533 TOTAL CORTISOL: CPT

## 2021-09-24 PROCEDURE — 36415 COLL VENOUS BLD VENIPUNCTURE: CPT

## 2021-09-24 PROCEDURE — 3051F HG A1C>EQUAL 7.0%<8.0%: CPT | Performed by: INTERNAL MEDICINE

## 2021-09-24 PROCEDURE — 83036 HEMOGLOBIN GLYCOSYLATED A1C: CPT

## 2021-10-11 ENCOUNTER — PREPPED CHART (OUTPATIENT)
Dept: URBAN - METROPOLITAN AREA CLINIC 6 | Facility: CLINIC | Age: 56
End: 2021-10-11

## 2021-10-13 ENCOUNTER — COMPLETE EYE EXAM (OUTPATIENT)
Dept: URBAN - METROPOLITAN AREA CLINIC 6 | Facility: CLINIC | Age: 56
End: 2021-10-13

## 2021-10-13 DIAGNOSIS — H40.1130: ICD-10-CM

## 2021-10-13 DIAGNOSIS — E11.9: ICD-10-CM

## 2021-10-13 DIAGNOSIS — H25.13: ICD-10-CM

## 2021-10-13 DIAGNOSIS — H04.123: ICD-10-CM

## 2021-10-13 PROCEDURE — 92083 EXTENDED VISUAL FIELD XM: CPT

## 2021-10-13 PROCEDURE — G8427 DOCREV CUR MEDS BY ELIG CLIN: HCPCS

## 2021-10-13 PROCEDURE — 92014 COMPRE OPH EXAM EST PT 1/>: CPT

## 2021-10-13 PROCEDURE — 1036F TOBACCO NON-USER: CPT

## 2021-10-13 ASSESSMENT — VISUAL ACUITY
OD_CC: 20/400
OS_CC: 20/30-1

## 2021-10-13 ASSESSMENT — TONOMETRY
OD_IOP_MMHG: 10
OS_IOP_MMHG: 13

## 2021-10-15 ENCOUNTER — TELEPHONE (OUTPATIENT)
Dept: SLEEP CENTER | Facility: CLINIC | Age: 56
End: 2021-10-15

## 2021-10-16 DIAGNOSIS — Z79.4 TYPE 2 DIABETES MELLITUS WITH HYPERGLYCEMIA, WITH LONG-TERM CURRENT USE OF INSULIN (HCC): ICD-10-CM

## 2021-10-16 DIAGNOSIS — E11.65 TYPE 2 DIABETES MELLITUS WITH HYPERGLYCEMIA, WITH LONG-TERM CURRENT USE OF INSULIN (HCC): ICD-10-CM

## 2021-10-18 RX ORDER — PEN NEEDLE, DIABETIC 31 GX5/16"
NEEDLE, DISPOSABLE MISCELLANEOUS
Qty: 100 EACH | Refills: 3 | Status: SHIPPED | OUTPATIENT
Start: 2021-10-18 | End: 2022-05-08 | Stop reason: SDUPTHER

## 2021-11-09 ENCOUNTER — OFFICE VISIT (OUTPATIENT)
Dept: ENDOCRINOLOGY | Facility: CLINIC | Age: 56
End: 2021-11-09
Payer: COMMERCIAL

## 2021-11-09 VITALS
HEART RATE: 74 BPM | WEIGHT: 169.13 LBS | HEIGHT: 70 IN | DIASTOLIC BLOOD PRESSURE: 88 MMHG | BODY MASS INDEX: 24.21 KG/M2 | SYSTOLIC BLOOD PRESSURE: 132 MMHG

## 2021-11-09 DIAGNOSIS — Z79.4 CURRENT USE OF INSULIN (HCC): ICD-10-CM

## 2021-11-09 DIAGNOSIS — E11.65 TYPE 2 DIABETES MELLITUS WITH HYPERGLYCEMIA, WITH LONG-TERM CURRENT USE OF INSULIN (HCC): Primary | ICD-10-CM

## 2021-11-09 DIAGNOSIS — Z79.4 TYPE 2 DIABETES MELLITUS WITH HYPERGLYCEMIA, WITH LONG-TERM CURRENT USE OF INSULIN (HCC): Primary | ICD-10-CM

## 2021-11-09 PROCEDURE — 3079F DIAST BP 80-89 MM HG: CPT | Performed by: INTERNAL MEDICINE

## 2021-11-09 PROCEDURE — 1036F TOBACCO NON-USER: CPT | Performed by: INTERNAL MEDICINE

## 2021-11-09 PROCEDURE — 3075F SYST BP GE 130 - 139MM HG: CPT | Performed by: INTERNAL MEDICINE

## 2021-11-09 PROCEDURE — 3008F BODY MASS INDEX DOCD: CPT | Performed by: INTERNAL MEDICINE

## 2021-11-09 PROCEDURE — 99214 OFFICE O/P EST MOD 30 MIN: CPT | Performed by: INTERNAL MEDICINE

## 2021-11-09 PROCEDURE — 95251 CONT GLUC MNTR ANALYSIS I&R: CPT | Performed by: INTERNAL MEDICINE

## 2021-11-09 RX ORDER — INSULIN ASPART 100 [IU]/ML
6 INJECTION, SUSPENSION SUBCUTANEOUS 2 TIMES DAILY
Qty: 6 ML | Refills: 5 | Status: SHIPPED | OUTPATIENT
Start: 2021-11-09 | End: 2022-05-08 | Stop reason: SDUPTHER

## 2022-01-09 ENCOUNTER — HOSPITAL ENCOUNTER (OUTPATIENT)
Dept: SLEEP CENTER | Facility: CLINIC | Age: 57
Discharge: HOME | End: 2022-01-09
Payer: COMMERCIAL

## 2022-01-09 DIAGNOSIS — E11.65 TYPE 2 DIABETES MELLITUS WITH HYPERGLYCEMIA, WITH LONG-TERM CURRENT USE OF INSULIN (HCC): ICD-10-CM

## 2022-01-09 DIAGNOSIS — I10 ESSENTIAL HYPERTENSION: ICD-10-CM

## 2022-01-09 DIAGNOSIS — G47.19 EXCESSIVE DAYTIME SLEEPINESS: ICD-10-CM

## 2022-01-09 DIAGNOSIS — G47.8 UNREFRESHED BY SLEEP: ICD-10-CM

## 2022-01-09 DIAGNOSIS — Z79.4 TYPE 2 DIABETES MELLITUS WITH HYPERGLYCEMIA, WITH LONG-TERM CURRENT USE OF INSULIN (HCC): ICD-10-CM

## 2022-01-09 DIAGNOSIS — R06.83 SNORING: ICD-10-CM

## 2022-01-09 DIAGNOSIS — G47.33 MILD OBSTRUCTIVE SLEEP APNEA: ICD-10-CM

## 2022-01-09 PROCEDURE — G0399 HOME SLEEP TEST/TYPE 3 PORTA: HCPCS

## 2022-01-10 NOTE — PROGRESS NOTES
Home Sleep Study Documentation    Pre-Sleep Home Study:    Set-up and instructions performed by: YOLANDA Zapata    Technician performed demonstration for Patient: yes    Return demonstration performed by Patient: yes    Written instructions provided to Patient: yes    Patient signed consent form: yes        Post-Sleep Home Study:    Additional comments by Patient:       Home Sleep Study Failed:no:    Failure reason: N/A    Reported or Detected: N/A    Scored by: SEKOU Hickey RPSGT

## 2022-01-11 ENCOUNTER — PATIENT MESSAGE (OUTPATIENT)
Dept: INTERNAL MEDICINE CLINIC | Facility: CLINIC | Age: 57
End: 2022-01-11

## 2022-01-11 DIAGNOSIS — G47.33 MODERATE OBSTRUCTIVE SLEEP APNEA: Primary | ICD-10-CM

## 2022-01-11 NOTE — TELEPHONE ENCOUNTER
From: Belkys Mckeon  To: Alo Park DO  Sent: 1/11/2022 11:35 AM EST  Subject: Sleep Study Result    Hi Dr Petty Max:    Thank you for getting the sleep study results over to me so fast  What would the next step in this process be?     Than you,    [employer]

## 2022-01-27 ENCOUNTER — TELEPHONE (OUTPATIENT)
Dept: ADMINISTRATIVE | Facility: OTHER | Age: 57
End: 2022-01-27

## 2022-01-27 NOTE — TELEPHONE ENCOUNTER
----- Message from Adolfo Fritz sent at 1/27/2022  8:57 AM EST -----  01/27/22 8:57 AM    Hello, our patient Gisselle Coleman has had Diabetic Eye Exam completed/performed  Please assist in updating the patient chart by pulling the document from the Media Tab  The date of service is 2021       Thank you,  Adolfo Fritz  [facility] MED

## 2022-01-27 NOTE — TELEPHONE ENCOUNTER
----- Message from Moi Mcgill sent at 1/27/2022  8:57 AM EST -----  01/27/22 8:57 AM    Hello, our patient Kenneth Kline has had Diabetic Eye Exam completed/performed  Please assist in updating the patient chart by pulling the document from the Media Tab  The date of service is 2021       Thank you,  Moi Mcgill  [facility] INTERNAL MED

## 2022-01-27 NOTE — TELEPHONE ENCOUNTER
Upon review of the In Basket request we have found/obtained the documentation  After careful review of the document we are unable to complete this request for Diabetic Eye Exam because the documentation does not have the result(s) needed to close the requested care gap(s)  Any additional questions or concerns should be emailed to the Practice Liaisons via "[email or web address][email or web address] email, please do not reply via In Basket      Thank you  Twan Jackson

## 2022-02-04 ENCOUNTER — APPOINTMENT (OUTPATIENT)
Dept: LAB | Facility: HOSPITAL | Age: 57
End: 2022-02-04
Attending: INTERNAL MEDICINE
Payer: COMMERCIAL

## 2022-02-04 DIAGNOSIS — Z79.4 TYPE 2 DIABETES MELLITUS WITH HYPERGLYCEMIA, WITH LONG-TERM CURRENT USE OF INSULIN (HCC): ICD-10-CM

## 2022-02-04 DIAGNOSIS — Z79.4 CURRENT USE OF INSULIN (HCC): ICD-10-CM

## 2022-02-04 DIAGNOSIS — E11.65 TYPE 2 DIABETES MELLITUS WITH HYPERGLYCEMIA, WITH LONG-TERM CURRENT USE OF INSULIN (HCC): ICD-10-CM

## 2022-02-04 LAB
ANION GAP SERPL CALCULATED.3IONS-SCNC: 5 MMOL/L (ref 4–13)
BUN SERPL-MCNC: 11 MG/DL (ref 5–25)
CALCIUM SERPL-MCNC: 9.3 MG/DL (ref 8.3–10.1)
CHLORIDE SERPL-SCNC: 98 MMOL/L (ref 100–108)
CO2 SERPL-SCNC: 29 MMOL/L (ref 21–32)
CREAT SERPL-MCNC: 0.88 MG/DL (ref 0.6–1.3)
CREAT UR-MCNC: 51.5 MG/DL
EST. AVERAGE GLUCOSE BLD GHB EST-MCNC: 177 MG/DL
GFR SERPL CREATININE-BSD FRML MDRD: 94 ML/MIN/1.73SQ M
GLUCOSE P FAST SERPL-MCNC: 169 MG/DL (ref 65–99)
HBA1C MFR BLD: 7.8 %
MICROALBUMIN UR-MCNC: <5 MG/L (ref 0–20)
MICROALBUMIN/CREAT 24H UR: <10 MG/G CREATININE (ref 0–30)
POTASSIUM SERPL-SCNC: 4.5 MMOL/L (ref 3.5–5.3)
SODIUM SERPL-SCNC: 132 MMOL/L (ref 136–145)

## 2022-02-04 PROCEDURE — 36415 COLL VENOUS BLD VENIPUNCTURE: CPT

## 2022-02-04 PROCEDURE — 82570 ASSAY OF URINE CREATININE: CPT

## 2022-02-04 PROCEDURE — 83036 HEMOGLOBIN GLYCOSYLATED A1C: CPT

## 2022-02-04 PROCEDURE — 3051F HG A1C>EQUAL 7.0%<8.0%: CPT | Performed by: PODIATRIST

## 2022-02-04 PROCEDURE — 82043 UR ALBUMIN QUANTITATIVE: CPT

## 2022-02-04 PROCEDURE — 80048 BASIC METABOLIC PNL TOTAL CA: CPT

## 2022-02-22 ENCOUNTER — OFFICE VISIT (OUTPATIENT)
Dept: PODIATRY | Facility: CLINIC | Age: 57
End: 2022-02-22
Payer: COMMERCIAL

## 2022-02-22 VITALS
SYSTOLIC BLOOD PRESSURE: 153 MMHG | HEART RATE: 67 BPM | BODY MASS INDEX: 24.65 KG/M2 | WEIGHT: 172.2 LBS | DIASTOLIC BLOOD PRESSURE: 91 MMHG | HEIGHT: 70 IN

## 2022-02-22 DIAGNOSIS — M21.41 PES PLANUS OF BOTH FEET: ICD-10-CM

## 2022-02-22 DIAGNOSIS — M20.41 HAMMER TOES OF BOTH FEET: ICD-10-CM

## 2022-02-22 DIAGNOSIS — M20.42 HAMMER TOES OF BOTH FEET: ICD-10-CM

## 2022-02-22 DIAGNOSIS — M21.42 PES PLANUS OF BOTH FEET: ICD-10-CM

## 2022-02-22 DIAGNOSIS — E11.65 TYPE 2 DIABETES MELLITUS WITH HYPERGLYCEMIA, WITH LONG-TERM CURRENT USE OF INSULIN (HCC): Primary | ICD-10-CM

## 2022-02-22 DIAGNOSIS — Z79.4 TYPE 2 DIABETES MELLITUS WITH HYPERGLYCEMIA, WITH LONG-TERM CURRENT USE OF INSULIN (HCC): Primary | ICD-10-CM

## 2022-02-22 DIAGNOSIS — Z79.4 CURRENT USE OF INSULIN (HCC): ICD-10-CM

## 2022-02-22 PROCEDURE — 1036F TOBACCO NON-USER: CPT | Performed by: PODIATRIST

## 2022-02-22 PROCEDURE — 99203 OFFICE O/P NEW LOW 30 MIN: CPT | Performed by: PODIATRIST

## 2022-02-22 PROCEDURE — 3008F BODY MASS INDEX DOCD: CPT | Performed by: PODIATRIST

## 2022-02-22 NOTE — LETTER
February 22, 2022     Philippe Olmos MD  [address] [address]    Patient: Chely Justice   YOB: 1965   Date of Visit: 2/22/2022       Dear Dr Randy Ram:    Thank you for referring Anahi Cagle to me for evaluation  Below are my notes for this consultation  If you have questions, please do not hesitate to call me  I look forward to following your patient along with you  Sincerely,        Ibis Rowell DPM        CC: DO Ibis Lai, [facility]  2/22/2022  3:51 PM  Sign when Signing Visit        PATIENT:  Chely Justice    1965      ASSESSMENT:     1  Type 2 diabetes mellitus with hyperglycemia, with long-term current use of insulin [facility])  Ambulatory referral to Podiatry    Device prior authorization   2  [facility]  Ambulatory referral to Podiatry   3  Pes planus of both feet  Device prior authorization   4  Hammer toes of both feet  Device prior authorization         PLAN:  1  Patient was counseled on the condition and diagnosis  2   Educated disease prevention and risks related to diabetes  3   Educated proper daily foot care and exam   Instructed proper skin care / protection and footwear  Instructed to identify any signs of infection and related foot problem  4   The recent blood work was reviewed and discussed  The last HbA1c was 7 8  Discussed proper blood glucose control with diet and exercise  5   He has mild pes planus and hammertoe deformity  TMTJ arthrosis with pronation following activity  Recommended functional orthotics  Will call his insurance for possible coverage  6   The patient will return in 6 months for diabetic foot exam or sooner for orthotic casting  Subjective:      HPI  The patient was referred to my office by his endocrinologist for diabetic foot evaluation  The patient has diabetes for 20 years  The blood glucose was little high  He is on insulin    The patient denied any history of diabetic foot ulcer, related foot infection, or amputation  No significant numbness or paresthesia  Denied weakness or significant functional deficit  He complained of foot deformity  He has flatfoot deformity for a long time  It may bother him after being on his feet a lot  Pain is usually around dorsolateral foot  The following portions of the patient's history were reviewed and updated as appropriate: allergies, current medications, past family history, past medical history, past social history, past surgical history and problem list   All pertinent labs and images were reviewed  Past Medical History  Past Medical History:   Diagnosis Date    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[facility] )     Diabetes mellitus ([place] [address] )     blood sugar 121 @ 0600    GERD (gastroesophageal reflux disease)     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[address] )     Sarcoid        Past Surgical History  Past Surgical History:   Procedure Laterality Date    COLONOSCOPY      ESOPHAGOGASTRODUODENOSCOPY N/A 4/2/2019    Procedure: ESOPHAGOGASTRODUODENOSCOPY (EGD); Surgeon: Dayna Barton MD;  Location: [facility]; Service: Gastroenterology    LYMPH NODE BIOPSY      LYMPH NODE DISSECTION  2009, left axilla    benign on pathology    OPTIC NERVE DECOMPRESSION      AL ESOPHAGOGASTRODUODENOSCOPY TRANSORAL DIAGNOSTIC N/A 5/26/2017    Procedure: EGD AND COLONOSCOPY;  Surgeon: Dayna Barton MD;  Location:  GI LAB;   Service: Gastroenterology    SINUS SURGERY      SINUS SURGERY      WISDOM TOOTH EXTRACTION          Allergies:  Ceclor [cefaclor]    Medications:  Current Outpatient Medications   Medication Sig Dispense Refill    Ascorbic Acid (VITAMIN C) 1000 MG tablet Take 1,000 mg by mouth daily      b complex vitamins tablet Take 1 tablet by mouth daily      B-D ULTRAFINE III SHORT PEN 31G X 8 MM MISC use 1 PEN NEEDLE to inject MEDICATION subcutaneously twice a day 100 each 3    Cyanocobalamin (VITAMIN B12 PO) Take 1 tablet by mouth daily      famotidine (PEPCID) 40 MG tablet TAKE 1 TABLET (40 MG TOTAL) BY MOUTH DAILY (Patient taking differently: 20 mg ) 30 tablet 6    FLUTICASONE PROPIONATE, INHAL, IN Inhale 2 sprays 2 (two) times a day      folic acid (FOLVITE) 1 mg tablet Take 1 mg by mouth daily      glucose blood test strip Check BG 4x daily 300 each 1    latanoprost (XALATAN) 0 005 % ophthalmic solution 1 drop daily at bedtime      lisinopril (ZESTRIL) 20 mg tablet Take 1 tablet (20 mg total) by mouth daily 90 tablet 1    metFORMIN (GLUCOPHAGE) 1000 MG tablet Take 1 tablet (1,000 mg total) by mouth 2 (two) times a day 60 tablet 11    Methotrexate Sodium (METHOTREXATE PO) Take 2 5 mg by mouth once a week 6 tab weekly      Multiple Vitamins-Minerals (PRESERVISION AREDS PO) Take 1 tablet by mouth daily      multivitamin (THERAGRAN) TABS Take 1 tablet by mouth daily      NovoLOG Mix 70/30 FlexPen (70-30) 100 units/mL injection pen Inject 6 Units under the skin 2 (two) times a day Inject 6units subcut just prior to breakfast and just prior to dinner  6 mL 5    Omega-3 Fatty Acids (FISH OIL PO) Take 1 capsule by mouth daily      riTUXimab (RITUXAN) injection Infuse into a venous catheter       rosuvastatin (CRESTOR) 10 MG tablet Take 1 tablet (10 mg total) by mouth daily 90 tablet 1    Vitamin D, Cholecalciferol, 1000 units CAPS Take 1 tablet by mouth 2 (two) times a day       Diclofenac Sodium (VOLTAREN) 1 % Apply 2 g topically 2 (two) times a day as needed (knee pain/OA) (Patient not taking: Reported on 8/5/2021) 1 Tube 0    mupirocin (BACTROBAN) 2 % ointment DILUTE 1 INCH OF MUPIROCIN OINT IN 8 OZ  OF SALINE IRRIGATE EACH NOSTRIL WITH 4 OZ  TWICE A DAY (Patient not taking: Reported on 8/5/2021)       No current facility-administered medications for this visit         Social History:  Social History     Socioeconomic History    Marital status: /Civil Union     Spouse name: None    Number of children: None    Years of education: None    Highest education level: None   Occupational History    Occupation:    Tobacco Use    Smoking status: Former Smoker     Packs/day: 0 75     Years: 20 00     Pack years: 15 00     Types: Cigarettes     Quit date: 2016     Years since quittin 1    Smokeless tobacco: Never Used   Vaping Use    Vaping Use: Never used   Substance and Sexual Activity    Alcohol use: Not Currently     Comment: rarely    Drug use: No    Sexual activity: None   Other Topics Concern    None   Social History Narrative    None     Social Determinants of Health     Financial Resource Strain: Not on file   Food Insecurity: Not on file   Transportation Needs: Not on file   Physical Activity: Not on file   Stress: Not on file   Social Connections: Not on file   Intimate Partner Violence: Not on file   Housing Stability: Not on file        Review of Systems   Constitutional: Negative for appetite change, chills and fever  HENT: Negative for sore throat  Eyes: Negative for visual disturbance  Respiratory: Negative  Cardiovascular: Negative  Gastrointestinal: Negative  Musculoskeletal: Negative for gait problem  Skin: Negative for rash and wound  Neurological: Negative for weakness and numbness  Hematological: Negative  Psychiatric/Behavioral: Negative for behavioral problems and confusion  Objective:      /91   Pulse 67   Ht 5' 10" (1 778 m) Comment: verbal  Wt 78 1 kg (172 lb 3 2 oz)   BMI 24 71 kg/m²          Physical Exam  Vitals reviewed  Constitutional:       General: He is not in acute distress  Appearance: Normal appearance  He is not toxic-appearing  HENT:      Head: Normocephalic and atraumatic  Eyes:      Extraocular Movements: Extraocular movements intact  Cardiovascular:      Rate and Rhythm: Normal rate and regular rhythm  Pulses: no weak pulses          Dorsalis pedis pulses are 1+ on the right side and 2+ on the left side  Posterior tibial pulses are 2+ on the right side and 2+ on the left side  Pulmonary:      Effort: Pulmonary effort is normal  No respiratory distress  Musculoskeletal:         General: Deformity present  No swelling, tenderness or signs of injury  Cervical back: Normal range of motion and neck supple  Right lower leg: No edema  Left lower leg: No edema  Right foot: Deformity present  No Charcot foot or foot drop  Left foot: Deformity present  No Charcot foot or foot drop  Comments: Mild pes planus presents  Flexible hammertoe presents  Hyperpronation noted  Feet:      Right foot:      Protective Sensation: 10 sites tested  10 sites sensed  Skin integrity: No ulcer, skin breakdown, erythema, warmth, callus or dry skin  Left foot:      Protective Sensation: 10 sites tested  10 sites sensed  Skin integrity: No ulcer, skin breakdown, erythema, warmth, callus or dry skin  Skin:     General: Skin is warm  Capillary Refill: Capillary refill takes less than 2 seconds  Coloration: Skin is not cyanotic or mottled  Findings: No abscess, ecchymosis, erythema, rash or wound  Nails: There is no clubbing  Neurological:      General: No focal deficit present  Mental Status: He is alert and oriented to person, place, and time  Cranial Nerves: No cranial nerve deficit  Sensory: No sensory deficit  Motor: No weakness  Coordination: Coordination normal       Gait: Gait normal       Deep Tendon Reflexes: Reflexes normal    Psychiatric:         Mood and Affect: Mood normal          Behavior: Behavior normal          Thought Content: Thought content normal          Judgment: Judgment normal              Diabetic Foot Exam    Patient's shoes and socks removed  Right Foot/Ankle   Right Foot Inspection  Skin Exam: skin normal and skin intact   No dry skin, no warmth, no callus, no erythema, no maceration, no abnormal color, no pre-ulcer, no ulcer and no callus  Toe Exam: right toe deformity  No swelling, no tenderness and erythema    Sensory   Vibration: intact  Proprioception: intact  Monofilament testing: intact    Vascular  Capillary refills: < 3 seconds  The right DP pulse is 1+  The right PT pulse is 2+  Left Foot/Ankle  Left Foot Inspection  Skin Exam: skin normal and skin intact  No dry skin, no warmth, no erythema, no maceration, normal color, no pre-ulcer, no ulcer and no callus  Toe Exam: left toe deformity  No swelling, no tenderness and no erythema  Sensory   Vibration: intact  Proprioception: intact  Monofilament testing: intact    Vascular  Capillary refills: < 3 seconds  The left DP pulse is 2+  The left PT pulse is 2+       Assign Risk Category  Deformity present  No loss of protective sensation  No weak pulses  Risk: 1

## 2022-02-22 NOTE — PATIENT INSTRUCTIONS
Foot Care for People with Diabetes   AMBULATORY CARE:   What you need to know about foot care:   · Foot care helps protect your feet and prevent foot ulcers or sores  Long-term high blood sugar levels can damage the blood vessels and nerves in your legs and feet  This damage makes it hard to feel pressure, pain, temperature, and touch  You may not be able to feel a cut or sore, or shoes that are too tight  Foot care is needed to prevent serious problems, such as an infection or amputation  · Diabetes may cause your toes to become crooked or curved under  These changes may affect the way you walk and can lead to increased pressure on your foot  The pressure can decrease blood flow to your feet  Lack of blood flow increases your risk for a foot ulcer  Do not ignore small problems, such as dry skin or small wounds  These can become life-threatening over time without proper care  Call your care team provider if:   · Your feet become numb, weak, or hard to move  · You have pus draining from a sore on your foot  · You have a wound on your foot that gets bigger, deeper, or does not heal      · You see blisters, cuts, scratches, calluses, or sores on your foot  · You have a fever, and your feet become red, warm, and swollen  · Your toenails become thick, curled, or yellow  · You find it hard to check your feet because your vision is poor  · You have questions or concerns about your condition or care  How to care for your feet:   · Check your feet each day  Look at your whole foot, including the bottom, and between and under your toes  Check for wounds, corns, and calluses  Use a mirror to see the bottom of your feet  The skin on your feet may be shiny, tight, or darker than normal  Your feet may also be cold and pale  Feel your feet by running your hands along the tops, bottoms, sides, and between your toes   Redness, swelling, and warmth are signs of blood flow problems that can lead to a foot ulcer  Do not try to remove corns or calluses yourself  · Wash your feet each day with soap and warm water  Do not use hot water, because this can injure your foot  Dry your feet gently with a towel after you wash them  Dry between and under your toes  · Apply lotion or a moisturizer on your dry feet  Ask your care team provider what lotions are best to use  Do not put lotion or moisturizer between your toes  Moisture between your toes could lead to skin breakdown  · Cut your toenails correctly  File or cut your toenails straight across  Use a soft brush to clean around your toenails  If your toenails are very thick, you may need to have a care team provider or specialist cut them  · Protect your feet  Do not walk barefoot or wear your shoes without socks  Check your shoes for rocks or other objects that can hurt your feet  Wear cotton socks to help keep your feet dry  Wear socks without toe seams, or wear them with the seams inside out  Change your socks each day  Do not wear socks that are dirty or damp  · Wear shoes that fit well  Wear shoes that do not rub against any area of your feet  Your shoes should be ½ to ¾ inch (1 to 2 centimeters) longer than your feet  Your shoes should also have extra space around the widest part of your feet  Walking or athletic shoes with laces or straps that adjust are best  Ask your care team provider for help to choose shoes that fit you best  Ask him or her if you need to wear an insert, orthotic, or bandage on your feet  · Go to your follow-up visits  Your care team provider will do a foot exam at least once a year  You may need a foot exam more often if you have nerve damage, foot deformities, or ulcers  He will check for nerve damage and how well you can feel your feet  He will check your shoes to see if they fit well  · Do not smoke  Smoking can damage your blood vessels and put you at increased risk for foot ulcers   Ask your care team provider for information if you currently smoke and need help to quit  E-cigarettes or smokeless tobacco still contain nicotine  Talk to your care team provider before you use these products  Follow up with your diabetes care team provider or foot specialist as directed: You will need to have your feet checked at least once a year  You may need a foot exam more often if you have nerve damage, foot deformities, or ulcers  Write down your questions so you remember to ask them during your visits  © Copyright [employer] 2021 Information is for End User's use only and may not be sold, redistributed or otherwise used for commercial purposes  All illustrations and images included in CareNotes® are the copyrighted property of [employer] M [employer]  or [facility] Lrorie Phelps   The above information is an  only  It is not intended as medical advice for individual conditions or treatments  Talk to your doctor, nurse or pharmacist before following any medical regimen to see if it is safe and effective for you

## 2022-02-22 NOTE — PROGRESS NOTES
PATIENT:  Forest Claros    1965      ASSESSMENT:     1  Type 2 diabetes mellitus with hyperglycemia, with long-term current use of insulin [facility])  Ambulatory referral to Podiatry    Device prior authorization   2  Current use of insulin ([facility] )  Ambulatory referral to Podiatry   3  Pes planus of both feet  Device prior authorization   4  Hammer toes of both feet  Device prior authorization         PLAN:  1  Patient was counseled on the condition and diagnosis  2   Educated disease prevention and risks related to diabetes  3   Educated proper daily foot care and exam   Instructed proper skin care / protection and footwear  Instructed to identify any signs of infection and related foot problem  4   The recent blood work was reviewed and discussed  The last HbA1c was 7 8  Discussed proper blood glucose control with diet and exercise  5   He has mild pes planus and hammertoe deformity  TMTJ arthrosis with pronation following activity  Recommended functional orthotics  Will call his insurance for possible coverage  6   The patient will return in 6 months for diabetic foot exam or sooner for orthotic casting  Subjective:      HPI  The patient was referred to my office by his endocrinologist for diabetic foot evaluation  The patient has diabetes for 20 years  The blood glucose was little high  He is on insulin  The patient denied any history of diabetic foot ulcer, related foot infection, or amputation  No significant numbness or paresthesia  Denied weakness or significant functional deficit  He complained of foot deformity  He has flatfoot deformity for a long time  It may bother him after being on his feet a lot  Pain is usually around dorsolateral foot          The following portions of the patient's history were reviewed and updated as appropriate: allergies, current medications, past family history, past medical history, past social history, past surgical history and problem list   All pertinent labs and images were reviewed  Past Medical History  Past Medical History:   Diagnosis Date    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[facility] )     Diabetes mellitus ([facility] )     blood sugar 121 @ 0600    GERD (gastroesophageal reflux disease)     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[address] )     Sarcoid        Past Surgical History  Past Surgical History:   Procedure Laterality Date    COLONOSCOPY      ESOPHAGOGASTRODUODENOSCOPY N/A 4/2/2019    Procedure: ESOPHAGOGASTRODUODENOSCOPY (EGD); Surgeon: Roldan Gunn MD;  Location: [facility] GI LAB; Service: Gastroenterology    LYMPH NODE BIOPSY      LYMPH NODE DISSECTION  2009, left axilla    benign on pathology    OPTIC NERVE DECOMPRESSION      GA ESOPHAGOGASTRODUODENOSCOPY TRANSORAL DIAGNOSTIC N/A 5/26/2017    Procedure: EGD AND COLONOSCOPY;  Surgeon: Roldan Gunn MD;  Location:  GI LAB;   Service: Gastroenterology    SINUS SURGERY      SINUS SURGERY      WISDOM TOOTH EXTRACTION          Allergies:  Ceclor [cefaclor]    Medications:  Current Outpatient Medications   Medication Sig Dispense Refill    Ascorbic Acid (VITAMIN C) 1000 MG tablet Take 1,000 mg by mouth daily      b complex vitamins tablet Take 1 tablet by mouth daily      B-D ULTRAFINE III SHORT PEN 31G X 8 MM MISC use 1 PEN NEEDLE to inject MEDICATION subcutaneously twice a day 100 each 3    Cyanocobalamin (VITAMIN B12 PO) Take 1 tablet by mouth daily      famotidine (PEPCID) 40 MG tablet TAKE 1 TABLET (40 MG TOTAL) BY MOUTH DAILY (Patient taking differently: 20 mg ) 30 tablet 6    FLUTICASONE PROPIONATE, INHAL, IN Inhale 2 sprays 2 (two) times a day      folic acid (FOLVITE) 1 mg tablet Take 1 mg by mouth daily      glucose blood test strip Check BG 4x daily 300 each 1    latanoprost (XALATAN) 0 005 % ophthalmic solution 1 drop daily at bedtime      lisinopril (ZESTRIL) 20 mg tablet Take 1 tablet (20 mg total) by mouth daily 90 tablet 1    metFORMIN (GLUCOPHAGE) 1000 MG tablet Take 1 tablet (1,000 mg total) by mouth 2 (two) times a day 60 tablet 11    Methotrexate Sodium (METHOTREXATE PO) Take 2 5 mg by mouth once a week 6 tab weekly      Multiple Vitamins-Minerals (PRESERVISION AREDS PO) Take 1 tablet by mouth daily      multivitamin (THERAGRAN) TABS Take 1 tablet by mouth daily      NovoLOG Mix 70/30 FlexPen (70-30) 100 units/mL injection pen Inject 6 Units under the skin 2 (two) times a day Inject 6units subcut just prior to breakfast and just prior to dinner  6 mL 5    Omega-3 Fatty Acids (FISH OIL PO) Take 1 capsule by mouth daily      riTUXimab (RITUXAN) injection Infuse into a venous catheter       rosuvastatin (CRESTOR) 10 MG tablet Take 1 tablet (10 mg total) by mouth daily 90 tablet 1    Vitamin D, Cholecalciferol, 1000 units CAPS Take 1 tablet by mouth 2 (two) times a day       Diclofenac Sodium (VOLTAREN) 1 % Apply 2 g topically 2 (two) times a day as needed (knee pain/OA) (Patient not taking: Reported on 2021) 1 Tube 0    mupirocin (BACTROBAN) 2 % ointment DILUTE 1 INCH OF MUPIROCIN OINT IN 8 OZ  OF SALINE IRRIGATE EACH NOSTRIL WITH 4 OZ  TWICE A DAY (Patient not taking: Reported on 2021)       No current facility-administered medications for this visit         Social History:  Social History     Socioeconomic History    Marital status: /Civil Union     Spouse name: None    Number of children: None    Years of education: None    Highest education level: None   Occupational History    Occupation:    Tobacco Use    Smoking status: Former Smoker     Packs/day: 0 75     Years: 20 00     Pack years: 15 00     Types: Cigarettes     Quit date:      Years since quittin 1    Smokeless tobacco: Never Used   Vaping Use    Vaping Use: Never used   Substance and Sexual Activity    Alcohol use: Not Currently     Comment: rarely    Drug use: No    Sexual activity: None   Other Topics Concern    None   Social History Narrative    None Social Determinants of Health     Financial Resource Strain: Not on file   Food Insecurity: Not on file   Transportation Needs: Not on file   Physical Activity: Not on file   Stress: Not on file   Social Connections: Not on file   Intimate Partner Violence: Not on file   Housing Stability: Not on file        Review of Systems   Constitutional: Negative for appetite change, chills and fever  HENT: Negative for sore throat  Eyes: Negative for visual disturbance  Respiratory: Negative  Cardiovascular: Negative  Gastrointestinal: Negative  Musculoskeletal: Negative for gait problem  Skin: Negative for rash and wound  Neurological: Negative for weakness and numbness  Hematological: Negative  Psychiatric/Behavioral: Negative for behavioral problems and confusion  Objective:      /91   Pulse 67   Ht 5' 10" (1 778 m) Comment: verbal  Wt 78 1 kg (172 lb 3 2 oz)   BMI 24 71 kg/m²          Physical Exam  Vitals reviewed  Constitutional:       General: He is not in acute distress  Appearance: Normal appearance  He is not toxic-appearing  HENT:      Head: Normocephalic and atraumatic  Eyes:      Extraocular Movements: Extraocular movements intact  Cardiovascular:      Rate and Rhythm: Normal rate and regular rhythm  Pulses: no weak pulses          Dorsalis pedis pulses are 1+ on the right side and 2+ on the left side  Posterior tibial pulses are 2+ on the right side and 2+ on the left side  Pulmonary:      Effort: Pulmonary effort is normal  No respiratory distress  Musculoskeletal:         General: Deformity present  No swelling, tenderness or signs of injury  Cervical back: Normal range of motion and neck supple  Right lower leg: No edema  Left lower leg: No edema  Right foot: Deformity present  No Charcot foot or foot drop  Left foot: Deformity present  No Charcot foot or foot drop  Comments: Mild pes planus presents  Flexible hammertoe presents  Hyperpronation noted  Feet:      Right foot:      Protective Sensation: 10 sites tested  10 sites sensed  Skin integrity: No ulcer, skin breakdown, erythema, warmth, callus or dry skin  Left foot:      Protective Sensation: 10 sites tested  10 sites sensed  Skin integrity: No ulcer, skin breakdown, erythema, warmth, callus or dry skin  Skin:     General: Skin is warm  Capillary Refill: Capillary refill takes less than 2 seconds  Coloration: Skin is not cyanotic or mottled  Findings: No abscess, ecchymosis, erythema, rash or wound  Nails: There is no clubbing  Neurological:      General: No focal deficit present  Mental Status: He is alert and oriented to person, place, and time  Cranial Nerves: No cranial nerve deficit  Sensory: No sensory deficit  Motor: No weakness  Coordination: Coordination normal       Gait: Gait normal       Deep Tendon Reflexes: Reflexes normal    Psychiatric:         Mood and Affect: Mood normal          Behavior: Behavior normal          Thought Content: Thought content normal          Judgment: Judgment normal              Diabetic Foot Exam    Patient's shoes and socks removed  Right Foot/Ankle   Right Foot Inspection  Skin Exam: skin normal and skin intact  No dry skin, no warmth, no callus, no erythema, no maceration, no abnormal color, no pre-ulcer, no ulcer and no callus  Toe Exam: right toe deformity  No swelling, no tenderness and erythema    Sensory   Vibration: intact  Proprioception: intact  Monofilament testing: intact    Vascular  Capillary refills: < 3 seconds  The right DP pulse is 1+  The right PT pulse is 2+  Left Foot/Ankle  Left Foot Inspection  Skin Exam: skin normal and skin intact  No dry skin, no warmth, no erythema, no maceration, normal color, no pre-ulcer, no ulcer and no callus  Toe Exam: left toe deformity  No swelling, no tenderness and no erythema  Sensory   Vibration: intact  Proprioception: intact  Monofilament testing: intact    Vascular  Capillary refills: < 3 seconds  The left DP pulse is 2+  The left PT pulse is 2+       Assign Risk Category  Deformity present  No loss of protective sensation  No weak pulses  Risk: 1

## 2022-03-10 ENCOUNTER — OFFICE VISIT (OUTPATIENT)
Dept: SLEEP CENTER | Facility: CLINIC | Age: 57
End: 2022-03-10
Payer: COMMERCIAL

## 2022-03-10 VITALS
BODY MASS INDEX: 24.2 KG/M2 | HEIGHT: 70 IN | SYSTOLIC BLOOD PRESSURE: 130 MMHG | DIASTOLIC BLOOD PRESSURE: 60 MMHG | HEART RATE: 95 BPM | WEIGHT: 169 LBS

## 2022-03-10 DIAGNOSIS — G47.33 MODERATE OBSTRUCTIVE SLEEP APNEA: ICD-10-CM

## 2022-03-10 PROCEDURE — 99203 OFFICE O/P NEW LOW 30 MIN: CPT | Performed by: INTERNAL MEDICINE

## 2022-03-10 NOTE — PROGRESS NOTES
Consultation - 40 Jennifer : 1965  MRN: [id number]      Assessment:  The patient has moderate obstructive sleep apnea on home sleep apnea testing  He complains of excessive daytime sleepiness    Plan:  Start APAP 4 to 20 cm    Follow up:  Compliance check    History of Present Illness:   60 y o male with a longstanding history of loud snoring  His wife sleep separately and he is unaware of witnessed apneas  He has excessive daytime sleepiness  He has a history of hypertension and diabetes         Review of Systems      Genitourinary need to urinate more than twice a night   Cardiology none   Gastrointestinal none   Neurology none   Constitutional fatigue   Integumentary none   Psychiatry none   Musculoskeletal none   Pulmonary snoring   ENT none   Endocrine none   Hematological none         I have reviewed and updated the review of systems as necessary    Historical Information    Past Medical History:  Hypertension, diabetes, GERD    Family History: non-contributory    Social History     Socioeconomic History    Marital status: /Civil Union     Spouse name: Not on file    Number of children: Not on file    Years of education: Not on file    Highest education level: Not on file   Occupational History    Occupation:    Tobacco Use    Smoking status: Former Smoker     Packs/day: 0 75     Years: 20 00     Pack years: 15 00     Types: Cigarettes     Quit date:      Years since quittin 1    Smokeless tobacco: Never Used   Vaping Use    Vaping Use: Never used   Substance and Sexual Activity    Alcohol use: Not Currently     Comment: rarely    Drug use: No    Sexual activity: Not on file   Other Topics Concern    Not on file   Social History Narrative    Not on file     Social Determinants of Health     Financial Resource Strain: Not on file   Food Insecurity: Not on file   Transportation Needs: Not on file   Physical Activity: Not on file Stress: Not on file   Social Connections: Not on file   Intimate Partner Violence: Not on file   Housing Stability: Not on file         Sleep Schedule: unremarkable    Snoring:  Yes    Witnessed Apnea:  Unknown    Medications/Allergies:    Current Outpatient Medications:     Ascorbic Acid (VITAMIN C) 1000 MG tablet, Take 1,000 mg by mouth daily, Disp: , Rfl:     b complex vitamins tablet, Take 1 tablet by mouth daily, Disp: , Rfl:     B-D ULTRAFINE III SHORT PEN 31G X 8 MM MISC, use 1 PEN NEEDLE to inject MEDICATION subcutaneously twice a day, Disp: 100 each, Rfl: 3    Cyanocobalamin (VITAMIN B12 PO), Take 1 tablet by mouth daily, Disp: , Rfl:     famotidine (PEPCID) 40 MG tablet, TAKE 1 TABLET (40 MG TOTAL) BY MOUTH DAILY (Patient taking differently: 20 mg ), Disp: 30 tablet, Rfl: 6    FLUTICASONE PROPIONATE, INHAL, IN, Inhale 2 sprays 2 (two) times a day, Disp: , Rfl:     folic acid (FOLVITE) 1 mg tablet, Take 1 mg by mouth daily, Disp: , Rfl:     glucose blood test strip, Check BG 4x daily, Disp: 300 each, Rfl: 1    latanoprost (XALATAN) 0 005 % ophthalmic solution, 1 drop daily at bedtime, Disp: , Rfl:     lisinopril (ZESTRIL) 20 mg tablet, Take 1 tablet (20 mg total) by mouth daily, Disp: 90 tablet, Rfl: 1    metFORMIN (GLUCOPHAGE) 1000 MG tablet, Take 1 tablet (1,000 mg total) by mouth 2 (two) times a day, Disp: 60 tablet, Rfl: 11    Methotrexate Sodium (METHOTREXATE PO), Take 2 5 mg by mouth once a week 6 tab weekly, Disp: , Rfl:     Multiple Vitamins-Minerals (PRESERVISION AREDS PO), Take 1 tablet by mouth daily, Disp: , Rfl:     multivitamin (THERAGRAN) TABS, Take 1 tablet by mouth daily, Disp: , Rfl:     NovoLOG Mix 70/30 FlexPen (70-30) 100 units/mL injection pen, Inject 6 Units under the skin 2 (two) times a day Inject 6units subcut just prior to breakfast and just prior to dinner , Disp: 6 mL, Rfl: 5    Omega-3 Fatty Acids (FISH OIL PO), Take 1 capsule by mouth daily, Disp: , Rfl:    riTUXimab (RITUXAN) injection, Infuse into a venous catheter , Disp: , Rfl:     rosuvastatin (CRESTOR) 10 MG tablet, Take 1 tablet (10 mg total) by mouth daily, Disp: 90 tablet, Rfl: 1    Vitamin D, Cholecalciferol, 1000 units CAPS, Take 1 tablet by mouth 2 (two) times a day , Disp: , Rfl:     Diclofenac Sodium (VOLTAREN) 1 %, Apply 2 g topically 2 (two) times a day as needed (knee pain/OA) (Patient not taking: Reported on 8/5/2021), Disp: 1 Tube, Rfl: 0    mupirocin (BACTROBAN) 2 % ointment, DILUTE 1 INCH OF MUPIROCIN OINT IN 8 OZ  OF SALINE IRRIGATE EACH NOSTRIL WITH 4 OZ  TWICE A DAY (Patient not taking: Reported on 8/5/2021), Disp: , Rfl:         No notes on file                  Objective:    Vital Signs:   Vitals:    03/10/22 1530   BP: 130/60   Pulse: 95   Weight: 76 7 kg (169 lb)   Height: 5' 10" (1 778 m)     Neck Circumference: 16 8      [place] Sleepiness Scale: Total score: 11    Physical Exam:    General: Alert, appropriate, cooperative, normal build    Head: NC/AT, moderate retrognathia    Nose: No septal deviation, nares not obstructed, mucosa normal    Throat: Airway slightly diminished, tongue base slightly thickened, no tonsils visualized    Neck: Normal, no thyromegaly or lymphadenopathy, no JVD    Heart: RR, normal S1 and S2, no murmurs    Chest: Clear bilaterally    Extremity: No clubbing, cyanosis, no edema    Skin: Warm, dry    Neuro: No motor abnormalities, cranial nerves appear intact    Sleep Study Results:   GRAHAM = 20 1  PAP Pressure: Auto PAP: 4 to 20 cm  DME Provider: [facility]    Counseling / Coordination of Care  A description of the counseling / coordination of care: We discussed the pathophysiology of obstructive sleep apnea as well as the possible treatment options  We also discussed the rationale for positive airway pressure therapy  Board Certified Sleep Specialist    Portions of the record may have been created with voice recognition software  Occasional wrong word or "sound a like" substitutions may have occurred due to the inherent limitations of voice recognition software  Read the chart carefully and recognize, using context, where substitutions have occurred

## 2022-03-11 DIAGNOSIS — E78.2 MIXED HYPERLIPIDEMIA: ICD-10-CM

## 2022-03-11 RX ORDER — ROSUVASTATIN CALCIUM 10 MG/1
10 TABLET, COATED ORAL DAILY
Qty: 90 TABLET | Refills: 1 | Status: SHIPPED | OUTPATIENT
Start: 2022-03-11 | End: 2022-05-08 | Stop reason: SDUPTHER

## 2022-03-22 ENCOUNTER — OFFICE VISIT (OUTPATIENT)
Dept: ENDOCRINOLOGY | Facility: CLINIC | Age: 57
End: 2022-03-22
Payer: COMMERCIAL

## 2022-03-22 VITALS
BODY MASS INDEX: 24.8 KG/M2 | DIASTOLIC BLOOD PRESSURE: 88 MMHG | HEART RATE: 67 BPM | SYSTOLIC BLOOD PRESSURE: 130 MMHG | WEIGHT: 173.25 LBS | HEIGHT: 70 IN

## 2022-03-22 DIAGNOSIS — Z79.4 TYPE 2 DIABETES MELLITUS WITH HYPERGLYCEMIA, WITH LONG-TERM CURRENT USE OF INSULIN (HCC): Primary | ICD-10-CM

## 2022-03-22 DIAGNOSIS — E11.65 TYPE 2 DIABETES MELLITUS WITH HYPERGLYCEMIA, WITH LONG-TERM CURRENT USE OF INSULIN (HCC): Primary | ICD-10-CM

## 2022-03-22 PROCEDURE — 99214 OFFICE O/P EST MOD 30 MIN: CPT | Performed by: INTERNAL MEDICINE

## 2022-03-22 PROCEDURE — 1036F TOBACCO NON-USER: CPT | Performed by: INTERNAL MEDICINE

## 2022-03-22 PROCEDURE — 95251 CONT GLUC MNTR ANALYSIS I&R: CPT | Performed by: INTERNAL MEDICINE

## 2022-03-22 PROCEDURE — 3008F BODY MASS INDEX DOCD: CPT | Performed by: INTERNAL MEDICINE

## 2022-03-22 RX ORDER — BLOOD SUGAR DIAGNOSTIC
STRIP MISCELLANEOUS
Qty: 300 STRIP | Refills: 2 | Status: SHIPPED | OUTPATIENT
Start: 2022-03-22

## 2022-03-22 NOTE — PROGRESS NOTES
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 and he was treated with high dose steroids  He denies complications of diabetes  Most recent a1c in February resulted at 7 8  Pt reports feeling well, has no acute complaints today  Current regimen: 70/30 mixed novolog insulin 6 units before breakfast and before dinner, metformin 1000mg BID  Has history of pancreatitis  Injects in: abdomen, rotates sites  Hypoglycemic episodes: rarely, usually only after strenuous exercise  [address] use     Indication   Type 2 Diabetes    More than 72 hours of data was reviewed  Report to be scanned to chart       Date Range: 3/9-3/22    Analysis of data:   Average Glucose: 149   SD : 21   Time in Target Range: 92   Time Above Range: 8   Time Below Range: 0   GMI 6 9%    Interpretation of data: Excellent blood sugar control    Healthcare maintenance:  Opthamology: reports cataracts that may be accelerating secondary to diabetes  Podiatry: follows with podiatry      Patient Active Problem List   Diagnosis    Gastroesophageal reflux disease without esophagitis    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    Idiopathic acute pancreatitis without infection or necrosis    Mixed hyperlipidemia    Type 2 diabetes mellitus with hyperglycemia, with long-term current use of insulin ([facility]    Essential hypertension    Current use of insulin ([facility]    DARYL (obstructive sleep apnea)      Past Medical History:   Diagnosis Date    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     Diabetes mellitus ([facility] [address] )     blood sugar 121 @ 0600    GERD (gastroesophageal reflux disease)     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[facility] )     Sarcoid       Past Surgical History:   Procedure Laterality Date    COLONOSCOPY      ESOPHAGOGASTRODUODENOSCOPY N/A 2019    Procedure: ESOPHAGOGASTRODUODENOSCOPY (EGD); Surgeon: Antonella Molina MD;  Location: [facility]; Service: Gastroenterology    LYMPH NODE BIOPSY      LYMPH NODE DISSECTION  , left axilla    benign on pathology    OPTIC NERVE DECOMPRESSION      [place] ESOPHAGOGASTRODUODENOSCOPY TRANSORAL DIAGNOSTIC N/A 2017    Procedure: EGD AND COLONOSCOPY;  Surgeon: Antonella Molina MD;  Location:  GI LAB;   Service: Gastroenterology    SINUS SURGERY      SINUS SURGERY      WISDOM TOOTH EXTRACTION        Family History   Problem Relation Age of Onset    Alzheimer's disease Mother     Hypertension Mother     Osteoporosis Mother     Hypertension Father     Heart attack Father     Osteoporosis Sister     Diabetes Brother     Diabetes type II Brother     Hyperthyroidism Brother     Diabetes type II Paternal Aunt      Social History     Tobacco Use    Smoking status: Former Smoker     Packs/day: 0 75     Years: 20 00     Pack years: 15 00     Types: Cigarettes     Quit date:      Years since quittin 2    Smokeless tobacco: Never Used   Substance Use Topics    Alcohol use: Not Currently     Comment: rarely     Allergies   Allergen Reactions    Ceclor [Cefaclor] Hives     Tolerate amoxicillin         Current Outpatient Medications:     Ascorbic Acid (VITAMIN C) 1000 MG tablet, Take 1,000 mg by mouth daily, Disp: , Rfl:     b complex vitamins tablet, Take 1 tablet by mouth daily, Disp: , Rfl:     B-D ULTRAFINE III SHORT PEN 31G X 8 MM MISC, use 1 PEN NEEDLE to inject MEDICATION subcutaneously twice a day, Disp: 100 each, Rfl: 3    Cyanocobalamin (VITAMIN B12 PO), Take 1 tablet by mouth daily, Disp: , Rfl:     famotidine (PEPCID) 40 MG tablet, TAKE 1 TABLET (40 MG TOTAL) BY MOUTH DAILY (Patient taking differently: 20 mg ), Disp: 30 tablet, Rfl: 6    FLUTICASONE PROPIONATE, INHAL, IN, Inhale 2 sprays 2 (two) times a day, Disp: , Rfl:     folic acid (FOLVITE) 1 mg tablet, Take 1 mg by mouth daily, Disp: , Rfl:     latanoprost (XALATAN) 0 005 % ophthalmic solution, 1 drop daily at bedtime, Disp: , Rfl:     lisinopril (ZESTRIL) 20 mg tablet, Take 1 tablet (20 mg total) by mouth daily, Disp: 90 tablet, Rfl: 1    metFORMIN (GLUCOPHAGE) 1000 MG tablet, Take 1 tablet (1,000 mg total) by mouth 2 (two) times a day, Disp: 60 tablet, Rfl: 11    Methotrexate Sodium (METHOTREXATE PO), Take 2 5 mg by mouth once a week 6 tab weekly, Disp: , Rfl:     Multiple Vitamins-Minerals (PRESERVISION AREDS PO), Take 1 tablet by mouth daily, Disp: , Rfl:     multivitamin (THERAGRAN) TABS, Take 1 tablet by mouth daily, Disp: , Rfl:     mupirocin (BACTROBAN) 2 % ointment, DILUTE 1 INCH OF MUPIROCIN OINT IN 8 OZ  OF SALINE IRRIGATE EACH NOSTRIL WITH 4 OZ  TWICE A DAY, Disp: , Rfl:     NovoLOG Mix 70/30 FlexPen (70-30) 100 units/mL injection pen, Inject 6 Units under the skin 2 (two) times a day Inject 6units subcut just prior to breakfast and just prior to dinner , Disp: 6 mL, Rfl: 5    Omega-3 Fatty Acids (FISH OIL PO), Take 1 capsule by mouth daily, Disp: , Rfl:     riTUXimab (RITUXAN) injection, Infuse into a venous catheter , Disp: , Rfl:     rosuvastatin (CRESTOR) 10 MG tablet, Take 1 tablet (10 mg total) by mouth daily, Disp: 90 tablet, Rfl: 1    Vitamin D, Cholecalciferol, 1000 units CAPS, Take 1 tablet by mouth 2 (two) times a day , Disp: , Rfl:     Diclofenac Sodium (VOLTAREN) 1 %, Apply 2 g topically 2 (two) times a day as needed (knee pain/OA) (Patient not taking: Reported on 8/5/2021), Disp: 1 Tube, Rfl: 0    glucose blood (OneTouch Ultra) test strip, Check up to 3 times daily as needed before meals and at bedtime  , Disp: 300 strip, Rfl: 2  Review of Systems   Constitutional: Negative for chills, fatigue and fever     HENT: Negative for rhinorrhea and sore throat  Respiratory: Negative for choking, chest tightness, shortness of breath, wheezing and stridor  Cardiovascular: Negative for chest pain, palpitations and leg swelling  Gastrointestinal: Negative for abdominal pain, blood in stool, constipation, diarrhea, nausea and vomiting  Genitourinary: Negative for hematuria  Neurological: Negative for dizziness and light-headedness  All other systems reviewed and are negative  Physical Exam:  Body mass index is 24 86 kg/m²  /88   Pulse 67   Ht 5' 10" (1 778 m)   Wt 78 6 kg (173 lb 4 oz)   BMI 24 86 kg/m²    Wt Readings from Last 3 Encounters:   03/22/22 78 6 kg (173 lb 4 oz)   03/10/22 76 7 kg (169 lb)   02/22/22 78 1 kg (172 lb 3 2 oz)       Physical Exam  Vitals reviewed  Constitutional:       Appearance: He is well-developed  HENT:      Head: Normocephalic and atraumatic  Eyes:      General:         Right eye: No discharge  Left eye: No discharge  Cardiovascular:      Rate and Rhythm: Normal rate and regular rhythm  Heart sounds: Normal heart sounds  No murmur heard  No friction rub  No gallop  Pulmonary:      Effort: Pulmonary effort is normal  No respiratory distress  Breath sounds: Normal breath sounds  No wheezing or rales  Chest:      Chest wall: No tenderness  Abdominal:      General: Bowel sounds are normal  There is no distension  Palpations: Abdomen is soft  There is no mass  Tenderness: There is no abdominal tenderness  Musculoskeletal:         General: Normal range of motion  Cervical back: Normal range of motion and neck supple  Skin:     General: Skin is warm and dry  Neurological:      Mental Status: He is alert and oriented to person, place, and time     Psychiatric:         Behavior: Behavior normal        Diabetic Foot Exam    Labs:   Lab Results   Component Value Date    HGBA1C 7 8 (H) 02/04/2022       Lab Results   Component Value Date [id number] 2 210 09/24/2021       Lab Results   Component Value Date    CREATININE 0 88 02/04/2022    CREATININE 0 91 09/24/2021    CREATININE 0 77 06/29/2021    BUN 11 02/04/2022    K 4 5 02/04/2022    CL 98 (L) 02/04/2022    CO2 29 02/04/2022     eGFR   Date Value Ref Range Status   02/04/2022 94 ml/min/1 73sq m Final     No components found for: [facility]    Lab Results   Component Value Date    HDL 54 06/29/2021    TRIG 127 06/29/2021       Lab Results   Component Value Date    ALT 41 06/29/2021    AST 17 06/29/2021    ALKPHOS 75 06/29/2021     Not on file     Impression:  1  Type 2 diabetes mellitus with hyperglycemia, with long-term current use of insulin ([facility] [address] )       Plan:  Gattman was seen today for diabetes type 2  Diagnoses and all orders for this visit:    Type 2 diabetes mellitus with hyperglycemia, with long-term current use of insulin ([facility])  -     glucose blood (OneTouch Ultra) test strip; Check up to 3 times daily as needed before meals and at bedtime   -     Hemoglobin Electrophoresis- Lab Collect; Future  -     Hepatic function panel Lab Collect; Future  -     Iron Panel (Includes Ferritin, Iron Sat%, Iron, and TIBC); Future  -     Fructosamine- Lab Collect; Future  -     CBC and Platelet- Lab Collect; Future  Most recent a1c resulted at 7 8, this is in contrast to well controlled blood sugars on dexcom with 92% time in range and GMI 6 9%  Will evaluate for other causes of falsely elevated hemoglobin a1c and check fructosamine  Advised pt to discontinue vitamin C ingestion indefinitely as it can interfere with both a1c and with continuous glucose monitoring  Discussed with the patient and all questioned fully answered  He will call me if any problems arise

## 2022-03-24 ENCOUNTER — APPOINTMENT (OUTPATIENT)
Dept: LAB | Facility: HOSPITAL | Age: 57
End: 2022-03-24
Payer: COMMERCIAL

## 2022-03-24 DIAGNOSIS — Z79.4 TYPE 2 DIABETES MELLITUS WITH HYPERGLYCEMIA, WITH LONG-TERM CURRENT USE OF INSULIN (HCC): ICD-10-CM

## 2022-03-24 DIAGNOSIS — E11.65 TYPE 2 DIABETES MELLITUS WITH HYPERGLYCEMIA, WITH LONG-TERM CURRENT USE OF INSULIN (HCC): ICD-10-CM

## 2022-03-24 LAB
ALBUMIN SERPL BCP-MCNC: 4.3 G/DL (ref 3.5–5)
ALP SERPL-CCNC: 83 U/L (ref 46–116)
ALT SERPL W P-5'-P-CCNC: 46 U/L (ref 12–78)
AST SERPL W P-5'-P-CCNC: 25 U/L (ref 5–45)
BILIRUB DIRECT SERPL-MCNC: 0.11 MG/DL (ref 0–0.2)
BILIRUB SERPL-MCNC: 0.38 MG/DL (ref 0.2–1)
ERYTHROCYTE [DISTWIDTH] IN BLOOD BY AUTOMATED COUNT: 14.1 % (ref 11.6–15.1)
FERRITIN SERPL-MCNC: 72 NG/ML (ref 8–388)
HCT VFR BLD AUTO: 42.2 % (ref 36.5–49.3)
HGB BLD-MCNC: 14.7 G/DL (ref 12–17)
IRON SATN MFR SERPL: 19 % (ref 20–50)
IRON SERPL-MCNC: 72 UG/DL (ref 65–175)
MCH RBC QN AUTO: 31.8 PG (ref 26.8–34.3)
MCHC RBC AUTO-ENTMCNC: 34.8 G/DL (ref 31.4–37.4)
MCV RBC AUTO: 91 FL (ref 82–98)
PLATELET # BLD AUTO: 363 THOUSANDS/UL (ref 149–390)
PMV BLD AUTO: 8.7 FL (ref 8.9–12.7)
PROT SERPL-MCNC: 7.1 G/DL (ref 6.4–8.2)
RBC # BLD AUTO: 4.62 MILLION/UL (ref 3.88–5.62)
TIBC SERPL-MCNC: 384 UG/DL (ref 250–450)
WBC # BLD AUTO: 5.73 THOUSAND/UL (ref 4.31–10.16)

## 2022-03-24 PROCEDURE — 83540 ASSAY OF IRON: CPT

## 2022-03-24 PROCEDURE — 85027 COMPLETE CBC AUTOMATED: CPT

## 2022-03-24 PROCEDURE — 36415 COLL VENOUS BLD VENIPUNCTURE: CPT

## 2022-03-24 PROCEDURE — 80076 HEPATIC FUNCTION PANEL: CPT

## 2022-03-24 PROCEDURE — 82985 ASSAY OF GLYCATED PROTEIN: CPT

## 2022-03-24 PROCEDURE — 82728 ASSAY OF FERRITIN: CPT

## 2022-03-24 PROCEDURE — 83020 HEMOGLOBIN ELECTROPHORESIS: CPT

## 2022-03-24 PROCEDURE — 83550 IRON BINDING TEST: CPT

## 2022-03-25 LAB — FRUCTOSAMINE SERPL-SCNC: 309 UMOL/L (ref 0–285)

## 2022-03-29 LAB
HGB A MFR BLD: 2.6 % (ref 1.8–3.2)
HGB A MFR BLD: 97.4 % (ref 96.4–98.8)
HGB F MFR BLD: 0 % (ref 0–2)
HGB FRACT BLD-IMP: NORMAL
HGB S MFR BLD: 0 %

## 2022-04-19 ENCOUNTER — TELEPHONE (OUTPATIENT)
Dept: ENDOCRINOLOGY | Facility: CLINIC | Age: 57
End: 2022-04-19

## 2022-04-19 NOTE — TELEPHONE ENCOUNTER
Patient left message inquiring if you can review Dexcom report since he has been off Vitamin C wanted to know if readings are better

## 2022-04-20 NOTE — TELEPHONE ENCOUNTER
Please let him know I reviewed the Dexcom  The readings are a little higher since he stopped the Vit C and it is more in line with the A1c  He's still in a good zone for Time in Range, but I would ask him not to take the vit C in the future  I do not want him to change the insulin doses   Thanks

## 2022-04-21 ENCOUNTER — PROCEDURE VISIT (OUTPATIENT)
Dept: PODIATRY | Facility: CLINIC | Age: 57
End: 2022-04-21
Payer: COMMERCIAL

## 2022-04-21 VITALS — BODY MASS INDEX: 24.65 KG/M2 | WEIGHT: 172.2 LBS | HEIGHT: 70 IN

## 2022-04-21 DIAGNOSIS — M21.41 PES PLANUS OF BOTH FEET: ICD-10-CM

## 2022-04-21 DIAGNOSIS — Z79.4 TYPE 2 DIABETES MELLITUS WITH HYPERGLYCEMIA, WITH LONG-TERM CURRENT USE OF INSULIN (HCC): Primary | ICD-10-CM

## 2022-04-21 DIAGNOSIS — M20.42 HAMMER TOES OF BOTH FEET: ICD-10-CM

## 2022-04-21 DIAGNOSIS — E11.65 TYPE 2 DIABETES MELLITUS WITH HYPERGLYCEMIA, WITH LONG-TERM CURRENT USE OF INSULIN (HCC): Primary | ICD-10-CM

## 2022-04-21 DIAGNOSIS — M20.41 HAMMER TOES OF BOTH FEET: ICD-10-CM

## 2022-04-21 DIAGNOSIS — M21.42 PES PLANUS OF BOTH FEET: ICD-10-CM

## 2022-04-21 PROCEDURE — S0395 IMPRESSION CASTING FT: HCPCS | Performed by: PODIATRIST

## 2022-04-21 PROCEDURE — 3008F BODY MASS INDEX DOCD: CPT | Performed by: INTERNAL MEDICINE

## 2022-04-21 PROCEDURE — L3000 FT INSERT UCB BERKELEY SHELL: HCPCS | Performed by: PODIATRIST

## 2022-04-21 NOTE — PROGRESS NOTES
Biomechanical exam was done and he was casted for orthotics bilaterally  Will call him when it is ready to be picked

## 2022-05-08 DIAGNOSIS — E11.65 TYPE 2 DIABETES MELLITUS WITH HYPERGLYCEMIA, WITH LONG-TERM CURRENT USE OF INSULIN (HCC): ICD-10-CM

## 2022-05-08 DIAGNOSIS — Z79.4 TYPE 2 DIABETES MELLITUS WITH HYPERGLYCEMIA, WITH LONG-TERM CURRENT USE OF INSULIN (HCC): ICD-10-CM

## 2022-05-09 PROCEDURE — 4010F ACE/ARB THERAPY RXD/TAKEN: CPT | Performed by: INTERNAL MEDICINE

## 2022-05-09 RX ORDER — PEN NEEDLE, DIABETIC 31 GX5/16"
NEEDLE, DISPOSABLE MISCELLANEOUS
Qty: 100 EACH | Refills: 0 | Status: SHIPPED | OUTPATIENT
Start: 2022-05-09 | End: 2022-07-04

## 2022-05-09 RX ORDER — INSULIN ASPART 100 [IU]/ML
6 INJECTION, SUSPENSION SUBCUTANEOUS 2 TIMES DAILY
Qty: 6 ML | Refills: 0 | Status: SHIPPED | OUTPATIENT
Start: 2022-05-09 | End: 2022-07-04

## 2022-05-10 ENCOUNTER — OFFICE VISIT (OUTPATIENT)
Dept: SLEEP CENTER | Facility: CLINIC | Age: 57
End: 2022-05-10

## 2022-05-10 VITALS
SYSTOLIC BLOOD PRESSURE: 140 MMHG | DIASTOLIC BLOOD PRESSURE: 70 MMHG | WEIGHT: 172 LBS | BODY MASS INDEX: 24.62 KG/M2 | HEIGHT: 70 IN

## 2022-05-10 DIAGNOSIS — G47.33 MODERATE OBSTRUCTIVE SLEEP APNEA: Primary | ICD-10-CM

## 2022-05-10 PROCEDURE — 3008F BODY MASS INDEX DOCD: CPT | Performed by: INTERNAL MEDICINE

## 2022-05-10 PROCEDURE — 99213 OFFICE O/P EST LOW 20 MIN: CPT | Performed by: INTERNAL MEDICINE

## 2022-05-10 NOTE — PROGRESS NOTES
Progress Note - 40 Jennifer :1965 MRN: [id number]      Reason for Visit:  60 y o male here for PAP compliance check    Assessment:  Doing well with new PAP device  Sleep quality is improved and the patient feels less drowsy  His mask leaks into his eyes both externally and through the lacrimal duct  Compliance data show utilization for greater than or equal to 70% of nights, for greater than or equal to 4 hours per night  Plan:  Adequate compliance and successful treatment  Change to APAP 6-10 cm for leak and for a feeling of insufficient pressure at initiation  Follow up: One year    History of Present Illness:  History of DARYL on PAP therapy  Meets adequate compliance  Review of Systems      Genitourinary none   Cardiology none   Gastrointestinal none   Neurology awaken with headache   Constitutional none   Integumentary none   Psychiatry none   Musculoskeletal joint pain   Pulmonary snoring   ENT none   Endocrine none   Hematological none         I have reviewed and updated the review of systems as necessary    Historical Information    Past Medical History:   Diagnosis Date    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[facility] )     Diabetes mellitus ([facility] )     blood sugar 121 @ 0600    GERD (gastroesophageal reflux disease)     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[facility] )     Sarcoid          Past Surgical History:   Procedure Laterality Date    COLONOSCOPY      ESOPHAGOGASTRODUODENOSCOPY N/A 2019    Procedure: ESOPHAGOGASTRODUODENOSCOPY (EGD); Surgeon: Inocencio Link MD;  Location: [facility]; Service: Gastroenterology    LYMPH NODE BIOPSY      LYMPH NODE DISSECTION  , left axilla    benign on pathology    OPTIC NERVE DECOMPRESSION      [place] ESOPHAGOGASTRODUODENOSCOPY TRANSORAL DIAGNOSTIC N/A 2017    Procedure: EGD AND COLONOSCOPY;  Surgeon: Inocencoi Link MD;  Location:  GI LAB;   Service: Gastroenterology    SINUS SURGERY      SINUS SURGERY      WISDOM TOOTH EXTRACTION Social History     Socioeconomic History    Marital status: /Civil Union     Spouse name: Not on file    Number of children: Not on file    Years of education: Not on file    Highest education level: Not on file   Occupational History    Occupation:    Tobacco Use    Smoking status: Former Smoker     Packs/day: 0 75     Years: 20 00     Pack years: 15 00     Types: Cigarettes     Quit date:      Years since quittin 3    Smokeless tobacco: Never Used   Vaping Use    Vaping Use: Never used   Substance and Sexual Activity    Alcohol use: Not Currently     Comment: rarely    Drug use: No    Sexual activity: Not on file   Other Topics Concern    Not on file   Social History Narrative    Not on file     Social Determinants of Health     Financial Resource Strain: Not on file   Food Insecurity: Not on file   Transportation Needs: Not on file   Physical Activity: Not on file   Stress: Not on file   Social Connections: Not on file   Intimate Partner Violence: Not on file   Housing Stability: Not on file         Family History   Problem Relation Age of Onset    Alzheimer's disease Mother     Hypertension Mother     Osteoporosis Mother     Hypertension Father     Heart attack Father     Osteoporosis Sister     Diabetes Brother     Diabetes type II Brother     Hyperthyroidism Brother     Diabetes type II Paternal Aunt        Medications/Allergies:      Current Outpatient Medications:     Ascorbic Acid (VITAMIN C) 1000 MG tablet, Take 1,000 mg by mouth daily, Disp: , Rfl:     b complex vitamins tablet, Take 1 tablet by mouth daily, Disp: , Rfl:     B-D ULTRAFINE III SHORT PEN 31G X 8 MM MISC, use 1 PEN NEEDLE to inject MEDICATION subcutaneously twice a day, Disp: 100 each, Rfl: 0    Cyanocobalamin (VITAMIN B12 PO), Take 1 tablet by mouth daily, Disp: , Rfl:     famotidine (PEPCID) 40 MG tablet, TAKE 1 TABLET (40 MG TOTAL) BY MOUTH DAILY (Patient taking differently: 20 mg ), Disp: 30 tablet, Rfl: 6    FLUTICASONE PROPIONATE, INHAL, IN, Inhale 2 sprays 2 (two) times a day, Disp: , Rfl:     folic acid (FOLVITE) 1 mg tablet, Take 1 mg by mouth daily, Disp: , Rfl:     glucose blood (OneTouch Ultra) test strip, Check up to 3 times daily as needed before meals and at bedtime  , Disp: 300 strip, Rfl: 2    latanoprost (XALATAN) 0 005 % ophthalmic solution, 1 drop daily at bedtime, Disp: , Rfl:     lisinopril (ZESTRIL) 20 mg tablet, Take 1 tablet (20 mg total) by mouth daily, Disp: 90 tablet, Rfl: 1    metFORMIN (GLUCOPHAGE) 1000 MG tablet, TAKE 1 TABLET (1,000 MG TOTLA) BY MOUTH 2 (TWO) TIMES A DAY, Disp: 60 tablet, Rfl: 6    methotrexate 2 5 mg tablet, , Disp: , Rfl:     Multiple Vitamins-Minerals (PRESERVISION AREDS PO), Take 1 tablet by mouth daily, Disp: , Rfl:     multivitamin (THERAGRAN) TABS, Take 1 tablet by mouth daily, Disp: , Rfl:     mupirocin (BACTROBAN) 2 % ointment, DILUTE 1 INCH OF MUPIROCIN OINT IN 8 OZ  OF SALINE IRRIGATE EACH NOSTRIL WITH 4 OZ  TWICE A DAY, Disp: , Rfl:     NovoLOG Mix 70/30 FlexPen (70-30) 100 units/mL injection pen, Inject 6 Units under the skin 2 (two) times a day Inject 6units subcut just prior to breakfast and just prior to dinner , Disp: 6 mL, Rfl: 0    Omega-3 Fatty Acids (FISH OIL PO), Take 1 capsule by mouth daily, Disp: , Rfl:     riTUXimab (RITUXAN) injection, Infuse into a venous catheter , Disp: , Rfl:     rosuvastatin (CRESTOR) 10 MG tablet, Take 1 tablet (10 mg total) by mouth daily, Disp: 90 tablet, Rfl: 1    Vitamin D, Cholecalciferol, 1000 units CAPS, Take 1 tablet by mouth 2 (two) times a day , Disp: , Rfl:     Diclofenac Sodium (VOLTAREN) 1 %, Apply 2 g topically 2 (two) times a day as needed (knee pain/OA) (Patient not taking: Reported on 8/5/2021), Disp: 1 Tube, Rfl: 0      Objective    Vital Signs:   Vitals:    05/10/22 0813   BP: 140/70     [place] Sleepiness Scale:  Total score: 4        Physical Exam:    General: Alert, appropriate, cooperative, overweight    Head: NC/AT    Skin: Warm, dry    Neuro: No motor abnormalities, cranial nerves appear intact    Extremity: No clubbing, cyanosis    PAP setting:   APAP 4-20 cm, change to 6-10 cm  DME Provider:  YME  Test results:  AHI= 20 1    Counseling / Coordination of Care  Total clinic time spent today 15 min  A description of the counseling / coordination of care: Maintain compliance  Discussed equipment  FRANKI Moore    Board Certified Sleep Specialist

## 2022-05-11 ENCOUNTER — TELEPHONE (OUTPATIENT)
Dept: SLEEP CENTER | Facility: CLINIC | Age: 57
End: 2022-05-11

## 2022-05-11 LAB
DME PARACHUTE DELIVERY DATE REQUESTED: NORMAL
DME PARACHUTE ITEM DESCRIPTION: NORMAL
DME PARACHUTE ORDER STATUS: NORMAL
DME PARACHUTE SUPPLIER NAME: NORMAL
DME PARACHUTE SUPPLIER PHONE: NORMAL

## 2022-05-19 ENCOUNTER — OFFICE VISIT (OUTPATIENT)
Dept: PODIATRY | Facility: CLINIC | Age: 57
End: 2022-05-19
Payer: COMMERCIAL

## 2022-05-19 DIAGNOSIS — M20.42 HAMMER TOES OF BOTH FEET: ICD-10-CM

## 2022-05-19 DIAGNOSIS — M21.42 PES PLANUS OF BOTH FEET: ICD-10-CM

## 2022-05-19 DIAGNOSIS — M21.41 PES PLANUS OF BOTH FEET: ICD-10-CM

## 2022-05-19 DIAGNOSIS — Z79.4 TYPE 2 DIABETES MELLITUS WITH HYPERGLYCEMIA, WITH LONG-TERM CURRENT USE OF INSULIN (HCC): Primary | ICD-10-CM

## 2022-05-19 DIAGNOSIS — M20.41 HAMMER TOES OF BOTH FEET: ICD-10-CM

## 2022-05-19 DIAGNOSIS — E11.65 TYPE 2 DIABETES MELLITUS WITH HYPERGLYCEMIA, WITH LONG-TERM CURRENT USE OF INSULIN (HCC): Primary | ICD-10-CM

## 2022-05-19 PROCEDURE — L3000 FT INSERT UCB BERKELEY SHELL: HCPCS | Performed by: PODIATRIST

## 2022-05-23 ENCOUNTER — FOLLOW UP (OUTPATIENT)
Dept: URBAN - METROPOLITAN AREA CLINIC 6 | Facility: CLINIC | Age: 57
End: 2022-05-23

## 2022-05-23 DIAGNOSIS — H44.23: ICD-10-CM

## 2022-05-23 DIAGNOSIS — H40.1130: ICD-10-CM

## 2022-05-23 DIAGNOSIS — E11.9: ICD-10-CM

## 2022-05-23 DIAGNOSIS — H25.13: ICD-10-CM

## 2022-05-23 DIAGNOSIS — H04.123: ICD-10-CM

## 2022-05-23 DIAGNOSIS — Z79.4: ICD-10-CM

## 2022-05-23 PROCEDURE — 92133 CPTRZD OPH DX IMG PST SGM ON: CPT

## 2022-05-23 PROCEDURE — 92012 INTRM OPH EXAM EST PATIENT: CPT

## 2022-05-23 PROCEDURE — 92134 CPTRZ OPH DX IMG PST SGM RTA: CPT

## 2022-05-23 ASSESSMENT — VISUAL ACUITY
OS_CC: 20/30-2
OD_CC: 20/400

## 2022-05-23 ASSESSMENT — TONOMETRY
OS_IOP_MMHG: 11
OD_IOP_MMHG: 10

## 2022-06-19 PROCEDURE — 4010F ACE/ARB THERAPY RXD/TAKEN: CPT | Performed by: INTERNAL MEDICINE

## 2022-06-28 ENCOUNTER — OFFICE VISIT (OUTPATIENT)
Dept: INTERNAL MEDICINE CLINIC | Facility: CLINIC | Age: 57
End: 2022-06-28
Payer: COMMERCIAL

## 2022-06-28 VITALS
DIASTOLIC BLOOD PRESSURE: 80 MMHG | SYSTOLIC BLOOD PRESSURE: 132 MMHG | OXYGEN SATURATION: 97 % | BODY MASS INDEX: 24.48 KG/M2 | HEIGHT: 70 IN | RESPIRATION RATE: 16 BRPM | HEART RATE: 69 BPM | WEIGHT: 171 LBS

## 2022-06-28 DIAGNOSIS — Z79.4 TYPE 2 DIABETES MELLITUS WITH HYPERGLYCEMIA, WITH LONG-TERM CURRENT USE OF INSULIN (HCC): ICD-10-CM

## 2022-06-28 DIAGNOSIS — E11.65 TYPE 2 DIABETES MELLITUS WITH HYPERGLYCEMIA, WITH LONG-TERM CURRENT USE OF INSULIN (HCC): ICD-10-CM

## 2022-06-28 DIAGNOSIS — K21.9 GASTROESOPHAGEAL REFLUX DISEASE WITHOUT ESOPHAGITIS: ICD-10-CM

## 2022-06-28 DIAGNOSIS — Z00.00 WELLNESS EXAMINATION: Primary | ICD-10-CM

## 2022-06-28 DIAGNOSIS — I10 ESSENTIAL HYPERTENSION: ICD-10-CM

## 2022-06-28 DIAGNOSIS — Z12.5 SCREENING FOR PROSTATE CANCER: ICD-10-CM

## 2022-06-28 DIAGNOSIS — E78.2 MIXED HYPERLIPIDEMIA: ICD-10-CM

## 2022-06-28 DIAGNOSIS — K85.00 IDIOPATHIC ACUTE PANCREATITIS WITHOUT INFECTION OR NECROSIS: ICD-10-CM

## 2022-06-28 PROCEDURE — 3725F SCREEN DEPRESSION PERFORMED: CPT | Performed by: INTERNAL MEDICINE

## 2022-06-28 PROCEDURE — 3008F BODY MASS INDEX DOCD: CPT | Performed by: INTERNAL MEDICINE

## 2022-06-28 PROCEDURE — 1036F TOBACCO NON-USER: CPT | Performed by: INTERNAL MEDICINE

## 2022-06-28 PROCEDURE — 99396 PREV VISIT EST AGE 40-64: CPT | Performed by: INTERNAL MEDICINE

## 2022-06-28 RX ORDER — DOXYCYCLINE HYCLATE 100 MG
TABLET ORAL
COMMUNITY
Start: 2022-05-30 | End: 2022-06-28

## 2022-06-28 NOTE — PATIENT INSTRUCTIONS
Ask your specialist at [facility] about 5th vaccine dose for COVID  Recommend you see and ENT, Dr Mariah Henriquez

## 2022-06-28 NOTE — PROGRESS NOTES
Assessment/Plan:     Diagnoses and all orders for this visit:    Wellness examination    Mixed hyperlipidemia  Comments:  taking statin and no SE, c/w rx  Orders:  -     Lipid Panel with Direct LDL reflex; Future    Essential hypertension  Comments:  BP doing well, c/w ACEI    Type 2 diabetes mellitus with hyperglycemia, with long-term current use of insulin (HCC)  Comments:  blood sugars overall doing well except having an increase in fasting blood sugar in the AM   c/w BID insulin/metformin and f/u endocrine    Gastroesophageal reflux disease without esophagitis  Comments:  takes H2 blocker and stable, c/w rx    Idiopathic acute pancreatitis without infection or necrosis  Comments:  takes methotrexate, rituximab infusions every 6 mos and sees [place] [facility] for ongoing care    Screening for prostate cancer  -     PSA, Total Screen    Other orders  -     Discontinue: doxycycline hyclate (VIBRA-TABS) 100 mg tablet; take 1 tablet by mouth twice a day for 7 days (Patient not taking: Reported on 6/28/2022)      [facility] is flying in September to [place] for work  Advised Nixon to get Evusheld prior to flying in hopes to help reduce his risk of getting sick from COVID-19  He will see ENT for voice irritation/hoarseness(chronic)  Subjective:      Patient ID: Fito Nolasco is a 60 y o  male  HPI    Here for physical, [facility] is taking his medications as prescribed  He is overall stable, exercising regularly and eating well  He is trying to get Evusheld thru his [place] specialist   He hs not rec'd 2nd COVID booster dose(5th dose due to being on immunosuppressants)  He had 1 pneumonia vaccine in the past   His blood sugars are doing well but are going up in the morning before having breakfast   He heard from a friend that lisinopril an cause hoarse voice  He has no cough or other related symptoms except sore throat with prolonged or loud speaking  He has no other concerns today, ROS is otherwise negative      Past Medical History:   Diagnosis Date    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[place] [address] )     Diabetes mellitus ([place] [address] )     blood sugar 121 @ 0600    GERD (gastroesophageal reflux disease)     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[place] [address] )     Sarcoid      Vitals:    06/28/22 1542   BP: 132/80   Pulse: 69   Resp: 16   SpO2: 97%   Weight: 77 6 kg (171 lb)   Height: 5' 10" (1 778 m)     Body mass index is 24 54 kg/m²  Current Outpatient Medications:     Ascorbic Acid (VITAMIN C) 1000 MG tablet, Take 1,000 mg by mouth daily, Disp: , Rfl:     b complex vitamins tablet, Take 1 tablet by mouth daily, Disp: , Rfl:     B-D ULTRAFINE III SHORT PEN 31G X 8 MM MISC, use 1 PEN NEEDLE to inject MEDICATION subcutaneously twice a day, Disp: 100 each, Rfl: 0    Cyanocobalamin (VITAMIN B12 PO), Take 1 tablet by mouth daily, Disp: , Rfl:     famotidine (PEPCID) 40 MG tablet, TAKE 1 TABLET (40 MG TOTAL) BY MOUTH DAILY (Patient taking differently: 20 mg), Disp: 30 tablet, Rfl: 6    folic acid (FOLVITE) 1 mg tablet, Take 1 mg by mouth daily, Disp: , Rfl:     glucose blood (OneTouch Ultra) test strip, Check up to 3 times daily as needed before meals and at bedtime  , Disp: 300 strip, Rfl: 2    latanoprost (XALATAN) 0 005 % ophthalmic solution, 1 drop daily at bedtime, Disp: , Rfl:     lisinopril (ZESTRIL) 20 mg tablet, Take 1 tablet (20 mg total) by mouth daily, Disp: 90 tablet, Rfl: 1    metFORMIN (GLUCOPHAGE) 1000 MG tablet, TAKE 1 TABLET (1,000 MG TOTLA) BY MOUTH 2 (TWO) TIMES A DAY, Disp: 60 tablet, Rfl: 6    Multiple Vitamins-Minerals (PRESERVISION AREDS PO), Take 1 tablet by mouth daily, Disp: , Rfl:     multivitamin (THERAGRAN) TABS, Take 1 tablet by mouth daily, Disp: , Rfl:     NovoLOG Mix 70/30 FlexPen (70-30) 100 units/mL injection pen, Inject 6 Units under the skin 2 (two) times a day Inject 6units subcut just prior to breakfast and just prior to dinner , Disp: 6 mL, Rfl: 0    Omega-3 Fatty Acids (FISH OIL PO), Take 1 capsule by mouth daily, Disp: , Rfl:     rosuvastatin (CRESTOR) 10 MG tablet, Take 1 tablet (10 mg total) by mouth daily, Disp: 90 tablet, Rfl: 1    Vitamin D, Cholecalciferol, 1000 units CAPS, Take 1 tablet by mouth 2 (two) times a day , Disp: , Rfl:     Diclofenac Sodium (VOLTAREN) 1 %, Apply 2 g topically 2 (two) times a day as needed (knee pain/OA) (Patient not taking: No sig reported), Disp: 1 Tube, Rfl: 0    FLUTICASONE PROPIONATE, INHAL, IN, Inhale 2 sprays 2 (two) times a day, Disp: , Rfl:     methotrexate 2 5 mg tablet, , Disp: , Rfl:     mupirocin (BACTROBAN) 2 % ointment, DILUTE 1 INCH OF MUPIROCIN OINT IN 8 OZ  OF SALINE IRRIGATE EACH NOSTRIL WITH 4 OZ  TWICE A DAY, Disp: , Rfl:     riTUXimab (RITUXAN) injection, Infuse into a venous catheter , Disp: , Rfl:   Allergies   Allergen Reactions    Ceclor [Cefaclor] Hives     Tolerate amoxicillin         Review of Systems   Constitutional: Negative for fever  HENT: Negative for congestion  Eyes: Negative for visual disturbance  Respiratory: Negative for shortness of breath  Cardiovascular: Negative for chest pain  Gastrointestinal: Negative for abdominal pain  Endocrine: Negative for polyuria  Genitourinary: Negative for difficulty urinating  Musculoskeletal: Negative for arthralgias  Skin: Negative for rash  Allergic/Immunologic: Positive for immunocompromised state  Neurological: Negative for dizziness  Psychiatric/Behavioral: Negative for dysphoric mood  Objective:      /80   Pulse 69   Resp 16   Ht 5' 10" (1 778 m)   Wt 77 6 kg (171 lb)   SpO2 97%   BMI 24 54 kg/m²          Physical Exam  Vitals reviewed  Constitutional:       Appearance: Normal appearance  HENT:      Head: Normocephalic and atraumatic  Right Ear: Tympanic membrane normal       Left Ear: Tympanic membrane normal    Eyes:      Conjunctiva/sclera: Conjunctivae normal    Cardiovascular:      Rate and Rhythm: Normal rate and regular rhythm  Heart sounds:  No murmur heard  Pulmonary:      Effort: Pulmonary effort is normal       Breath sounds: No wheezing or rales  Abdominal:      General: Bowel sounds are normal       Palpations: Abdomen is soft  Tenderness: There is no abdominal tenderness  Musculoskeletal:      Right lower leg: No edema  Left lower leg: No edema  Neurological:      Mental Status: He is alert  Mental status is at baseline     Psychiatric:         Mood and Affect: Mood normal          Behavior: Behavior normal

## 2022-07-01 DIAGNOSIS — E11.65 TYPE 2 DIABETES MELLITUS WITH HYPERGLYCEMIA, WITH LONG-TERM CURRENT USE OF INSULIN (HCC): ICD-10-CM

## 2022-07-01 DIAGNOSIS — Z79.4 TYPE 2 DIABETES MELLITUS WITH HYPERGLYCEMIA, WITH LONG-TERM CURRENT USE OF INSULIN (HCC): ICD-10-CM

## 2022-07-01 PROBLEM — Z79.899 LONG TERM CURRENT USE OF IMMUNOSUPPRESSIVE DRUG: Status: ACTIVE | Noted: 2022-07-01

## 2022-07-01 PROBLEM — Z79.60 LONG TERM CURRENT USE OF IMMUNOSUPPRESSIVE DRUG: Status: ACTIVE | Noted: 2022-07-01

## 2022-07-04 RX ORDER — PEN NEEDLE, DIABETIC 31 GX5/16"
NEEDLE, DISPOSABLE MISCELLANEOUS
Qty: 100 EACH | Refills: 3 | Status: SHIPPED | OUTPATIENT
Start: 2022-07-04

## 2022-07-04 RX ORDER — INSULIN ASPART 100 [IU]/ML
INJECTION, SUSPENSION SUBCUTANEOUS
Qty: 6 ML | Refills: 0 | Status: SHIPPED | OUTPATIENT
Start: 2022-07-04 | End: 2022-08-02 | Stop reason: SDUPTHER

## 2022-07-05 ENCOUNTER — TELEPHONE (OUTPATIENT)
Dept: PODIATRY | Facility: CLINIC | Age: 57
End: 2022-07-05

## 2022-07-05 ENCOUNTER — TELEPHONE (OUTPATIENT)
Dept: INTERNAL MEDICINE CLINIC | Facility: CLINIC | Age: 57
End: 2022-07-05

## 2022-07-05 RX ORDER — PEN NEEDLE, DIABETIC 31 GX5/16"
NEEDLE, DISPOSABLE MISCELLANEOUS
Qty: 100 EACH | Refills: 0 | Status: SHIPPED | OUTPATIENT
Start: 2022-07-05

## 2022-07-05 RX ORDER — INSULIN ASPART 100 [IU]/ML
6 INJECTION, SUSPENSION SUBCUTANEOUS 2 TIMES DAILY
Qty: 15 ML | Refills: 1 | Status: SHIPPED | OUTPATIENT
Start: 2022-07-05 | End: 2022-08-02 | Stop reason: SDUPTHER

## 2022-07-05 NOTE — TELEPHONE ENCOUNTER
----- Message from Mark Noe DO sent at 7/5/2022  8:46 AM EDT -----  Regarding: FW: Jac  Please call [facility] to schedule Gianni President for jac infusion     Infusion center phone#: [phone] option #1    Thanks!  ----- Message -----  From: aMru Ware  Sent: 7/1/2022   6:04 PM EDT  To: [facility]  Subject: Jac                                         Thanks very much, Dr Kp Otto  Just about any day in the next few weeks would be fine  Something after 10am would be best so I can check on things at my company in the early morning  Thanks again!     Gianni Hwang

## 2022-07-05 NOTE — TELEPHONE ENCOUNTER
Called pt b/c he had to cx his orthotic check-up appt due to work schedule  He had an established appt on 08 23 2022 for a 6month appt  Not within 90 day liz period, changed appt to [email or web address] for 6month fu and orthotic check  Pt knows of [facility] location

## 2022-07-06 ENCOUNTER — HOSPITAL ENCOUNTER (OUTPATIENT)
Dept: INFUSION CENTER | Facility: CLINIC | Age: 57
Discharge: HOME | End: 2022-07-06
Payer: COMMERCIAL

## 2022-07-06 VITALS
TEMPERATURE: 97.1 F | OXYGEN SATURATION: 96 % | HEART RATE: 82 BPM | RESPIRATION RATE: 16 BRPM | SYSTOLIC BLOOD PRESSURE: 124 MMHG | DIASTOLIC BLOOD PRESSURE: 80 MMHG

## 2022-07-06 DIAGNOSIS — Z79.899 LONG TERM CURRENT USE OF IMMUNOSUPPRESSIVE DRUG: Primary | ICD-10-CM

## 2022-07-06 DIAGNOSIS — D89.89 IGG4 RELATED DISEASE (HCC): ICD-10-CM

## 2022-07-06 PROCEDURE — M0220 HB TIXAGEV AND CILGAV INF ADMIN: HCPCS | Performed by: INTERNAL MEDICINE

## 2022-07-06 RX ADMIN — AZD7442 600 MG COMBINED: KIT at 12:38

## 2022-07-06 NOTE — PROGRESS NOTES
Patient presents today for Evusheld injections  Patient offers no complaints  VSS  Patient made aware the Evusheld is offered under EUA and printed material given to patient  Patient reports understanding and gives verbal consent  Evusheld injection administered into bilateral buttocks  Tolerated without incident  Patient tolerated ordered one hour without incident  Patient without any further appointments, AVS declined

## 2022-07-18 ENCOUNTER — APPOINTMENT (OUTPATIENT)
Dept: LAB | Facility: HOSPITAL | Age: 57
End: 2022-07-18
Payer: COMMERCIAL

## 2022-07-18 DIAGNOSIS — E78.2 MIXED HYPERLIPIDEMIA: ICD-10-CM

## 2022-07-18 LAB
CHOLEST SERPL-MCNC: 185 MG/DL
HDLC SERPL-MCNC: 59 MG/DL
LDLC SERPL CALC-MCNC: 104 MG/DL (ref 0–100)
PSA SERPL-MCNC: 0.8 NG/ML (ref 0–4)
TRIGL SERPL-MCNC: 108 MG/DL

## 2022-07-18 PROCEDURE — 80061 LIPID PANEL: CPT

## 2022-07-18 PROCEDURE — 36415 COLL VENOUS BLD VENIPUNCTURE: CPT | Performed by: INTERNAL MEDICINE

## 2022-07-18 PROCEDURE — G0103 PSA SCREENING: HCPCS | Performed by: INTERNAL MEDICINE

## 2022-07-20 ENCOUNTER — TELEPHONE (OUTPATIENT)
Dept: INTERNAL MEDICINE CLINIC | Facility: CLINIC | Age: 57
End: 2022-07-20

## 2022-07-20 NOTE — TELEPHONE ENCOUNTER
----- Message from Michael Sarmiento DO sent at 7/20/2022  3:41 PM EDT -----  BW I ordered is back  Cholesterol went up a bit to 185, please c/w statin and low cholesterol diet  PSA(prostate cancer screening) is normal at 0 8  Results released to [employer]    Let me know if questions

## 2022-08-02 ENCOUNTER — OFFICE VISIT (OUTPATIENT)
Dept: ENDOCRINOLOGY | Facility: CLINIC | Age: 57
End: 2022-08-02
Payer: COMMERCIAL

## 2022-08-02 VITALS
HEART RATE: 62 BPM | WEIGHT: 169.8 LBS | HEIGHT: 70 IN | DIASTOLIC BLOOD PRESSURE: 76 MMHG | SYSTOLIC BLOOD PRESSURE: 120 MMHG | BODY MASS INDEX: 24.31 KG/M2

## 2022-08-02 DIAGNOSIS — Z79.4 CURRENT USE OF INSULIN (HCC): Primary | ICD-10-CM

## 2022-08-02 DIAGNOSIS — E11.65 TYPE 2 DIABETES MELLITUS WITH HYPERGLYCEMIA, WITH LONG-TERM CURRENT USE OF INSULIN (HCC): Primary | ICD-10-CM

## 2022-08-02 DIAGNOSIS — E11.65 TYPE 2 DIABETES MELLITUS WITH HYPERGLYCEMIA, WITH LONG-TERM CURRENT USE OF INSULIN (HCC): ICD-10-CM

## 2022-08-02 DIAGNOSIS — Z79.4 TYPE 2 DIABETES MELLITUS WITH HYPERGLYCEMIA, WITH LONG-TERM CURRENT USE OF INSULIN (HCC): ICD-10-CM

## 2022-08-02 DIAGNOSIS — Z79.4 TYPE 2 DIABETES MELLITUS WITH HYPERGLYCEMIA, WITH LONG-TERM CURRENT USE OF INSULIN (HCC): Primary | ICD-10-CM

## 2022-08-02 LAB — SL AMB POCT HEMOGLOBIN AIC: 7.8 (ref ?–6.5)

## 2022-08-02 PROCEDURE — 95251 CONT GLUC MNTR ANALYSIS I&R: CPT | Performed by: INTERNAL MEDICINE

## 2022-08-02 PROCEDURE — 99213 OFFICE O/P EST LOW 20 MIN: CPT | Performed by: INTERNAL MEDICINE

## 2022-08-02 PROCEDURE — 83036 HEMOGLOBIN GLYCOSYLATED A1C: CPT

## 2022-08-02 PROCEDURE — 3051F HG A1C>EQUAL 7.0%<8.0%: CPT | Performed by: PODIATRIST

## 2022-08-02 RX ORDER — INSULIN ASPART 100 [IU]/ML
INJECTION, SUSPENSION SUBCUTANEOUS
Qty: 15 ML | Refills: 1 | Status: SHIPPED | OUTPATIENT
Start: 2022-08-02

## 2022-08-02 NOTE — PROGRESS NOTES
Established Patient Progress Note      Chief Complaint   Patient presents with    Diabetes Type 2      Referring Provider  [facility] [place], Do  [address] [facility],  [address] [place] [address]     History of Present Illness:   Forest Claros is a 60 y o  male with a history of type 2 diabetes, last seen in 3/2022  He has a Dexcom but this is reading lower then the A1c  Since his last visit, his sister visited from [facility] and he did have family gatherings       Diabetes hx:   He was dx with T2DM in 2002, but started insulin in 2016  He has been seen by GI for idiopathic pancreatitis  He recalls one discrete episode of pancreatitis, but also several times when he had back pain         He was taking Janumet and glimepiride initially, but was changed to Novolog 70/30 after the episode of pancreatitis    Also, 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  He follows closely with [facility] ([facility])  For maintenance, he gets Rituximab and methotrexate         He works as an , and follows a very good diet      Current regimen: Novolog 70/30 6units bf and dinner and metformin 1g twice daily   Of note, he had a lower c peptide in 12/2020 and has not tried SGLT2, though it was discussed today and he is unsure  Eyes: [place] [id number], Dr Rosalynn Apley monitoring for glaucoma ; also seen [facility]  Pod: self care also seen in Feb 2022 (Dr Elisabeth Zayas) has orthotics      He does take lisinopril and simvastatin without issues  Will exercise rabia after dinner    Joey Bailey   Device used   Home use     Indication   Type 2 Diabetes    More than 72 hours of data was reviewed  Report to be scanned to chart       Date Range: 7/20/22-8/2/2022    Analysis of data:   Average Glucose: 157  Coefficient of Variation: n/a  SD : 33mg/dL  Time in Target Range: 81%  Time Above Range: 16% 2% v high  Time Below Range: 0     Interpretation of data:   Still with mild excursions after meals  No hypoglycemia seen         Patient Active Problem List   Diagnosis    Gastroesophageal reflux disease without esophagitis    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[place] [address] )    Idiopathic acute pancreatitis without infection or necrosis    Mixed hyperlipidemia    Type 2 diabetes mellitus with hyperglycemia, with long-term current use of insulin ([place] [address] )    Essential hypertension    Current use of insulin (HCC)    DARYL (obstructive sleep apnea)    Long term current use of immunosuppressive drug      Past Medical History:   Diagnosis Date    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[place] [address] )     Diabetes mellitus ([place] [address] )     blood sugar 121 @ 0600    GERD (gastroesophageal reflux disease)     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[place] [address] )     Sarcoid       Past Surgical History:   Procedure Laterality Date    COLONOSCOPY      ESOPHAGOGASTRODUODENOSCOPY N/A 2019    Procedure: ESOPHAGOGASTRODUODENOSCOPY (EGD); Surgeon: Shelly Matthew MD;  Location: [facility] GI LAB; Service: Gastroenterology    LYMPH NODE BIOPSY      LYMPH NODE DISSECTION  , left axilla    benign on pathology    OPTIC NERVE DECOMPRESSION      [place] ESOPHAGOGASTRODUODENOSCOPY TRANSORAL DIAGNOSTIC N/A 2017    Procedure: EGD AND COLONOSCOPY;  Surgeon: Shelly Matthew MD;  Location:  GI LAB;   Service: Gastroenterology    SINUS SURGERY      SINUS SURGERY      WISDOM TOOTH EXTRACTION        Family History   Problem Relation Age of Onset    Alzheimer's disease Mother     Hypertension Mother     Osteoporosis Mother     Hypertension Father     Heart attack Father     Osteoporosis Sister     Diabetes Brother     Diabetes type II Brother     Hyperthyroidism Brother     Diabetes type II Paternal Aunt      Social History     Tobacco Use    Smoking status: Former Smoker     Packs/day: 0 75     Years: 20 00     Pack years: 15 00     Types: Cigarettes     Quit date:      Years since quittin 5    Smokeless tobacco: Never Used   Substance Use Topics    Alcohol use: Not Currently     Comment: rarely     Allergies   Allergen Reactions    Ceclor [Cefaclor] Hives     Tolerate amoxicillin         Current Outpatient Medications:     b complex vitamins tablet, Take 1 tablet by mouth daily, Disp: , Rfl:     B-D ULTRAFINE III SHORT PEN 31G X 8 MM MISC, use 1 PEN NEEDLE to inject MEDICATION subcutaneously twice a day, Disp: 100 each, Rfl: 0    Cyanocobalamin (VITAMIN B12 PO), Take 1 tablet by mouth daily, Disp: , Rfl:     Droplet Pen Needles 31G X 8 MM MISC, USE 1 PEN NEDDLE FOR INSULIN INJECTION SUBCUTANEOUSLY TWICE A DAY, Disp: 100 each, Rfl: 3    FLUTICASONE PROPIONATE, INHAL, IN, Inhale 2 sprays 2 (two) times a day, Disp: , Rfl:     folic acid (FOLVITE) 1 mg tablet, Take 1 mg by mouth daily, Disp: , Rfl:     glucose blood (OneTouch Ultra) test strip, Check up to 3 times daily as needed before meals and at bedtime  , Disp: 300 strip, Rfl: 2    latanoprost (XALATAN) 0 005 % ophthalmic solution, 1 drop daily at bedtime, Disp: , Rfl:     lisinopril (ZESTRIL) 20 mg tablet, Take 1 tablet (20 mg total) by mouth daily, Disp: 90 tablet, Rfl: 1    metFORMIN (GLUCOPHAGE) 1000 MG tablet, Take 1 tablet (1,000 mg total) by mouth 2 (two) times a day with meals, Disp: 60 tablet, Rfl: 5    methotrexate 2 5 mg tablet, , Disp: , Rfl:     Multiple Vitamins-Minerals (PRESERVISION AREDS PO), Take 1 tablet by mouth daily, Disp: , Rfl:     multivitamin (THERAGRAN) TABS, Take 1 tablet by mouth daily, Disp: , Rfl:     mupirocin (BACTROBAN) 2 % ointment, DILUTE 1 INCH OF MUPIROCIN OINT IN 8 OZ  OF SALINE IRRIGATE EACH NOSTRIL WITH 4 OZ   TWICE A DAY, Disp: , Rfl:     NovoLOG Mix 70/30 FlexPen (70-30) 100 units/mL injection pen, Inject 7units subcut just prior to breakfast and just prior to dinner , Disp: 15 mL, Rfl: 1    Omega-3 Fatty Acids (FISH OIL PO), Take 1 capsule by mouth daily, Disp: , Rfl:     riTUXimab (RITUXAN) injection, Infuse into a venous catheter , Disp: , Rfl:     rosuvastatin (CRESTOR) 10 MG tablet, Take 1 tablet (10 mg total) by mouth daily, Disp: 90 tablet, Rfl: 1    Vitamin D, Cholecalciferol, 1000 units CAPS, Take 1 tablet by mouth 2 (two) times a day , Disp: , Rfl:     Ascorbic Acid (VITAMIN C) 1000 MG tablet, Take 1,000 mg by mouth daily (Patient not taking: Reported on 8/2/2022), Disp: , Rfl:     Diclofenac Sodium (VOLTAREN) 1 %, Apply 2 g topically 2 (two) times a day as needed (knee pain/OA) (Patient not taking: No sig reported), Disp: 1 Tube, Rfl: 0    famotidine (PEPCID) 40 MG tablet, TAKE 1 TABLET (40 MG TOTAL) BY MOUTH DAILY (Patient taking differently: 20 mg), Disp: 30 tablet, Rfl: 6  Review of Systems   Constitutional: Negative for unexpected weight change  HENT: Negative for hearing loss and voice change  Eyes: Negative for visual disturbance  Respiratory: Negative for shortness of breath  Gastrointestinal: Negative for constipation and diarrhea  Endocrine: Negative for polydipsia and polyuria  Neurological: Negative for tremors  Physical Exam:  Body mass index is 24 36 kg/m²  /76   Pulse 62   Ht 5' 10" (1 778 m)   Wt 77 kg (169 lb 12 8 oz)   BMI 24 36 kg/m²    Wt Readings from Last 3 Encounters:   08/02/22 77 kg (169 lb 12 8 oz)   06/28/22 77 6 kg (171 lb)   05/10/22 78 kg (172 lb)         Physical Exam     Physical Exam   Gen: appears well-developed and well-nourished  No apparent distress  Head: Normocephalic and atraumatic  Eyes: no stare or proptosis, no periorbital edema  E/N/M mask in place, hearing grossly intact  Neck: range of motion nl  Pulmonary/Chest: breathing  comfortably, no accessory muscle use, effort normal    Musculoskeletal: moves all extremities, gait nl  Neurological: alert and oriented to person, place, and time   No upper ext tremor appreciated  Skin: does not appear diaphoretic, no facial plethora  Psychiatric: normal mood and affect; behavior is normal; no gross lapses in memory, answer questions appropriately            Labs:     Lab Results   Component Value Date    HGBA1C 7 8 (A) 08/02/2022         Lab Results   Component Value Date    CREATININE 0 88 02/04/2022    CREATININE 0 91 09/24/2021    CREATININE 0 77 06/29/2021    BUN 11 02/04/2022    K 4 5 02/04/2022    CL 98 (L) 02/04/2022    CO2 29 02/04/2022     eGFR   Date Value Ref Range Status   02/04/2022 94 ml/min/1 73sq m Final     No components found for: [facility]    Lab Results   Component Value Date    HDL 59 07/18/2022    TRIG 108 07/18/2022       Lab Results   Component Value Date    ALT 46 03/24/2022    AST 25 03/24/2022    ALKPHOS 83 03/24/2022           Impression:  1  Current use of insulin ([address] )    2  Type 2 diabetes mellitus with hyperglycemia, with long-term current use of insulin ([address] )           Plan:    [facility] today for diabetes type 2  Diagnoses and all orders for this visit:    Current use of insulin ([address] )  -     metFORMIN (GLUCOPHAGE) 1000 MG tablet; Take 1 tablet (1,000 mg total) by mouth 2 (two) times a day with meals  -     NovoLOG Mix 70/30 FlexPen (70-30) 100 units/mL injection pen; Inject 7units subcut just prior to breakfast and just prior to dinner  Type 2 diabetes mellitus with hyperglycemia, with long-term current use of insulin ([facility])  -     metFORMIN (GLUCOPHAGE) 1000 MG tablet; Take 1 tablet (1,000 mg total) by mouth 2 (two) times a day with meals  -     NovoLOG Mix 70/30 FlexPen (70-30) 100 units/mL injection pen; Inject 7units subcut just prior to breakfast and just prior to dinner   -     Hemoglobin A1C; Future  -     Basic metabolic panel Lab Collect; Future         1  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

## 2022-08-09 ENCOUNTER — OFFICE VISIT (OUTPATIENT)
Dept: PODIATRY | Facility: CLINIC | Age: 57
End: 2022-08-09
Payer: COMMERCIAL

## 2022-08-09 VITALS — HEIGHT: 70 IN | WEIGHT: 170 LBS | BODY MASS INDEX: 24.34 KG/M2

## 2022-08-09 DIAGNOSIS — E11.65 TYPE 2 DIABETES MELLITUS WITH HYPERGLYCEMIA, WITH LONG-TERM CURRENT USE OF INSULIN (HCC): Primary | ICD-10-CM

## 2022-08-09 DIAGNOSIS — M19.072 PRIMARY OSTEOARTHRITIS OF LEFT FOOT: ICD-10-CM

## 2022-08-09 DIAGNOSIS — Z79.4 TYPE 2 DIABETES MELLITUS WITH HYPERGLYCEMIA, WITH LONG-TERM CURRENT USE OF INSULIN (HCC): Primary | ICD-10-CM

## 2022-08-09 DIAGNOSIS — M21.42 PES PLANUS OF BOTH FEET: ICD-10-CM

## 2022-08-09 DIAGNOSIS — M21.41 PES PLANUS OF BOTH FEET: ICD-10-CM

## 2022-08-09 PROCEDURE — 99213 OFFICE O/P EST LOW 20 MIN: CPT | Performed by: PODIATRIST

## 2022-08-09 NOTE — PROGRESS NOTES
PATIENT:  Laurie Santos    1965      ASSESSMENT:     1  Type 2 diabetes mellitus with hyperglycemia, with long-term current use of insulin ([facility] )     2  Pes planus of both feet     3  Primary osteoarthritis of left foot         PLAN:  1  Patient was counseled on the condition and diagnosis  2   Educated disease prevention and risks related to diabetes  3   Educated proper daily foot care and exam   Instructed proper skin care / protection and footwear  Instructed to identify any signs of infection and related foot problem  4   The recent blood work was reviewed and discussed  The last HbA1c was 7 8  Discussed proper blood glucose control with diet and exercise  5   Left foot pain is due to TMTJ arthrosis with pronation following activity  Will try voltaren gel as needed  Continue orthotics  6   The patient will return in 6 months for diabetic foot exam and casting for another orthotics  Subjective:      HPI  The patient presents for diabetic foot exam   BS is okay  No wounds  No significant numbness or paresthesia  Denied weakness or significant functional deficit  Mild aches in dorsal / dorsolateral left foot if he is on his feet a lot  It has been better with orthotics  The following portions of the patient's history were reviewed and updated as appropriate: allergies, current medications, past family history, past medical history, past social history, past surgical history and problem list   All pertinent labs and images were reviewed      Past Medical History  Past Medical History:   Diagnosis Date    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[facility] )     Diabetes mellitus ([facility] )     blood sugar 121 @ 0600    GERD (gastroesophageal reflux disease)     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[facility] )     Sarcoid        Past Surgical History  Past Surgical History:   Procedure Laterality Date    COLONOSCOPY      ESOPHAGOGASTRODUODENOSCOPY N/A 4/2/2019    Procedure: ESOPHAGOGASTRODUODENOSCOPY (EGD); Surgeon: Sandip Ramos MD;  Location: [facility] GI LAB; Service: Gastroenterology    LYMPH NODE BIOPSY      LYMPH NODE DISSECTION  2009, left axilla    benign on pathology    OPTIC NERVE DECOMPRESSION      [place] ESOPHAGOGASTRODUODENOSCOPY TRANSORAL DIAGNOSTIC N/A 5/26/2017    Procedure: EGD AND COLONOSCOPY;  Surgeon: Sandip Ramos MD;  Location:  GI LAB; Service: Gastroenterology    SINUS SURGERY      SINUS SURGERY      WISDOM TOOTH EXTRACTION          Allergies:  Cefaclor    Medications:  Current Outpatient Medications   Medication Sig Dispense Refill    b complex vitamins tablet Take 1 tablet by mouth daily      B-D ULTRAFINE III SHORT PEN 31G X 8 MM MISC use 1 PEN NEEDLE to inject MEDICATION subcutaneously twice a day 100 each 0    Cyanocobalamin (VITAMIN B12 PO) Take 1 tablet by mouth daily      Droplet Pen Needles 31G X 8 MM MISC USE 1 PEN NEDDLE FOR INSULIN INJECTION SUBCUTANEOUSLY TWICE A  each 3    famotidine (PEPCID) 40 MG tablet Take 1 tablet (40 mg total) by mouth 2 (two) times a day 180 tablet 3    FLUTICASONE PROPIONATE, INHAL, IN Inhale 2 sprays 2 (two) times a day      folic acid (FOLVITE) 1 mg tablet Take 1 mg by mouth daily      glucose blood (OneTouch Ultra) test strip Check up to 3 times daily as needed before meals and at bedtime  300 strip 2    latanoprost (XALATAN) 0 005 % ophthalmic solution 1 drop daily at bedtime      lisinopril (ZESTRIL) 20 mg tablet Take 1 tablet (20 mg total) by mouth daily 90 tablet 1    metFORMIN (GLUCOPHAGE) 1000 MG tablet Take 1 tablet (1,000 mg total) by mouth 2 (two) times a day with meals 60 tablet 5    methotrexate 2 5 mg tablet       Multiple Vitamins-Minerals (PRESERVISION AREDS PO) Take 1 tablet by mouth daily      multivitamin (THERAGRAN) TABS Take 1 tablet by mouth daily      mupirocin (BACTROBAN) 2 % ointment DILUTE 1 INCH OF MUPIROCIN OINT IN 8 OZ  OF SALINE IRRIGATE EACH NOSTRIL WITH 4 OZ   TWICE A DAY      NovoLOG Mix 70/30 FlexPen (70-30) 100 units/mL injection pen Inject 7units subcut just prior to breakfast and just prior to dinner  15 mL 1    Omega-3 Fatty Acids (FISH OIL PO) Take 1 capsule by mouth daily      riTUXimab (RITUXAN) injection Infuse into a venous catheter       rosuvastatin (CRESTOR) 10 MG tablet Take 1 tablet (10 mg total) by mouth daily 90 tablet 1    Vitamin D, Cholecalciferol, 1000 units CAPS Take 1 tablet by mouth 2 (two) times a day       Ascorbic Acid (VITAMIN C) 1000 MG tablet Take 1,000 mg by mouth daily (Patient not taking: No sig reported)      Diclofenac Sodium (VOLTAREN) 1 % Apply 2 g topically 2 (two) times a day as needed (knee pain/OA) (Patient not taking: No sig reported) 1 Tube 0     No current facility-administered medications for this visit  Social History:  Social History     Socioeconomic History    Marital status: /Civil Union     Spouse name: None    Number of children: None    Years of education: None    Highest education level: None   Occupational History    Occupation:    Tobacco Use    Smoking status: Former Smoker     Packs/day: 0 75     Years: 20 00     Pack years: 15 00     Types: Cigarettes     Quit date:      Years since quittin 6    Smokeless tobacco: Never Used   Vaping Use    Vaping Use: Never used   Substance and Sexual Activity    Alcohol use: Not Currently     Comment: rarely    Drug use: No    Sexual activity: None   Other Topics Concern    None   Social History Narrative    None     Social Determinants of Health     Financial Resource Strain: Not on file   Food Insecurity: Not on file   Transportation Needs: Not on file   Physical Activity: Not on file   Stress: Not on file   Social Connections: Not on file   Intimate Partner Violence: Not on file   Housing Stability: Not on file        Review of Systems   Constitutional: Negative for appetite change, chills and fever  Respiratory: Negative      Cardiovascular: Negative  Gastrointestinal: Negative  Musculoskeletal: Negative for gait problem  Skin: Negative for wound  Neurological: Negative for weakness and numbness  Objective:      Ht 5' 10" (1 778 m) Comment: verbal  Wt 77 1 kg (170 lb)   BMI 24 39 kg/m²          Physical Exam  Vitals reviewed  Constitutional:       General: He is not in acute distress  Appearance: Normal appearance  He is not toxic-appearing  Cardiovascular:      Rate and Rhythm: Normal rate and regular rhythm  Pulses: no weak pulses          Dorsalis pedis pulses are 1+ on the right side and 2+ on the left side  Posterior tibial pulses are 2+ on the right side and 2+ on the left side  Pulmonary:      Effort: Pulmonary effort is normal  No respiratory distress  Musculoskeletal:         General: Deformity present  No swelling, tenderness or signs of injury  Right lower leg: No edema  Left lower leg: No edema  Right foot: Deformity present  No Charcot foot or foot drop  Left foot: Deformity present  No Charcot foot or foot drop  Comments: Mild pes planus presents  Flexible hammertoe presents  Mild hypertrophy of TMTJ left foot without focal pain today  Feet:      Right foot:      Protective Sensation: 10 sites tested  10 sites sensed  Skin integrity: No ulcer, skin breakdown, erythema, warmth, callus or dry skin  Left foot:      Protective Sensation: 10 sites tested  10 sites sensed  Skin integrity: No ulcer, skin breakdown, erythema, warmth, callus or dry skin  Skin:     General: Skin is warm  Capillary Refill: Capillary refill takes less than 2 seconds  Coloration: Skin is not cyanotic or mottled  Findings: No abscess, ecchymosis, erythema, rash or wound  Nails: There is no clubbing  Neurological:      General: No focal deficit present  Mental Status: He is alert and oriented to person, place, and time        Cranial Nerves: No cranial nerve deficit  Sensory: No sensory deficit  Motor: No weakness  Coordination: Coordination normal       Gait: Gait normal       Deep Tendon Reflexes: Reflexes normal    Psychiatric:         Mood and Affect: Mood normal          Behavior: Behavior normal          Thought Content: Thought content normal          Judgment: Judgment normal              Diabetic Foot Exam    Patient's shoes and socks removed  Right Foot/Ankle   Right Foot Inspection  Skin Exam: skin normal and skin intact  No dry skin, no warmth, no callus, no erythema, no maceration, no abnormal color, no pre-ulcer, no ulcer and no callus  Toe Exam: right toe deformity  No swelling, no tenderness and erythema    Sensory   Vibration: intact  Proprioception: intact  Monofilament testing: intact    Vascular  Capillary refills: < 3 seconds  The right DP pulse is 1+  The right PT pulse is 2+  Left Foot/Ankle  Left Foot Inspection  Skin Exam: skin normal and skin intact  No dry skin, no warmth, no erythema, no maceration, normal color, no pre-ulcer, no ulcer and no callus  Toe Exam: left toe deformity  No swelling, no tenderness and no erythema  Sensory   Vibration: intact  Proprioception: intact  Monofilament testing: intact    Vascular  Capillary refills: < 3 seconds  The left DP pulse is 2+  The left PT pulse is 2+       Assign Risk Category  Deformity present  No loss of protective sensation  No weak pulses  Risk: 1

## 2022-08-09 NOTE — PATIENT INSTRUCTIONS
Foot Care for People with Diabetes   AMBULATORY CARE:   What you need to know about foot care: Foot care helps protect your feet and prevent foot ulcers or sores  Long-term high blood sugar levels can damage the blood vessels and nerves in your legs and feet  This damage makes it hard to feel pressure, pain, temperature, and touch  You may not be able to feel a cut or sore, or shoes that are too tight  Foot care is needed to prevent serious problems, such as an infection or amputation  Diabetes may cause your toes to become crooked or curved under  These changes may affect the way you walk and can lead to increased pressure on your foot  The pressure can decrease blood flow to your feet  Lack of blood flow increases your risk for a foot ulcer  Do not ignore small problems, such as dry skin or small wounds  These can become life-threatening over time without proper care  Call your care team provider if:   Your feet become numb, weak, or hard to move  You have pus draining from a sore on your foot  You have a wound on your foot that gets bigger, deeper, or does not heal      You see blisters, cuts, scratches, calluses, or sores on your foot  You have a fever, and your feet become red, warm, and swollen  Your toenails become thick, curled, or yellow  You find it hard to check your feet because your vision is poor  You have questions or concerns about your condition or care  How to care for your feet:   Check your feet each day  Look at your whole foot, including the bottom, and between and under your toes  Check for wounds, corns, and calluses  Use a mirror to see the bottom of your feet  The skin on your feet may be shiny, tight, or darker than normal  Your feet may also be cold and pale  Feel your feet by running your hands along the tops, bottoms, sides, and between your toes  Redness, swelling, and warmth are signs of blood flow problems that can lead to a foot ulcer   Do not try to remove corns or calluses yourself  Wash your feet each day with soap and warm water  Do not use hot water, because this can injure your foot  Dry your feet gently with a towel after you wash them  Dry between and under your toes  Apply lotion or a moisturizer on your dry feet  Ask your care team provider what lotions are best to use  Do not put lotion or moisturizer between your toes  Moisture between your toes could lead to skin breakdown  Cut your toenails correctly  File or cut your toenails straight across  Use a soft brush to clean around your toenails  If your toenails are very thick, you may need to have a care team provider or specialist cut them  Protect your feet  Do not walk barefoot or wear your shoes without socks  Check your shoes for rocks or other objects that can hurt your feet  Wear cotton socks to help keep your feet dry  Wear socks without toe seams, or wear them with the seams inside out  Change your socks each day  Do not wear socks that are dirty or damp  Wear shoes that fit well  Wear shoes that do not rub against any area of your feet  Your shoes should be ½ to ¾ inch (1 to 2 centimeters) longer than your feet  Your shoes should also have extra space around the widest part of your feet  Walking or athletic shoes with laces or straps that adjust are best  Ask your care team provider for help to choose shoes that fit you best  Ask him or her if you need to wear an insert, orthotic, or bandage on your feet  Go to your follow-up visits  Your care team provider will do a foot exam at least once a year  You may need a foot exam more often if you have nerve damage, foot deformities, or ulcers  He will check for nerve damage and how well you can feel your feet  He will check your shoes to see if they fit well  Do not smoke  Smoking can damage your blood vessels and put you at increased risk for foot ulcers   Ask your care team provider for information if you currently smoke and need help to quit  E-cigarettes or smokeless tobacco still contain nicotine  Talk to your care team provider before you use these products  Follow up with your diabetes care team provider or foot specialist as directed: You will need to have your feet checked at least once a year  You may need a foot exam more often if you have nerve damage, foot deformities, or ulcers  Write down your questions so you remember to ask them during your visits  © Copyright [employer] 2022 Information is for End User's use only and may not be sold, redistributed or otherwise used for commercial purposes  All illustrations and images included in CareNotes® are the copyrighted property of A D A M , Inc  or [facility] Lorrie Phelps   The above information is an  only  It is not intended as medical advice for individual conditions or treatments  Talk to your doctor, nurse or pharmacist before following any medical regimen to see if it is safe and effective for you

## 2022-10-19 ENCOUNTER — NURSE TRIAGE (OUTPATIENT)
Dept: OTHER | Facility: OTHER | Age: 57
End: 2022-10-19

## 2022-10-19 NOTE — TELEPHONE ENCOUNTER
1  Were you within 6 feet or less, for up to 15 minutes or more with a person that has a confirmed COVID-19 test? Patient   2  What was the date of your exposure? +10/18/22  3  Are you experiencing any symptoms attributed to the virus?  (Assess for SOB, cough, fever, difficulty breathing) Headache, nasal congestion, sore throat, onset 10/17/22  4  HIGH RISK: Do you have any history heart or lung conditions, weakened immune system, diabetes, Asthma, CHF, HIV, COPD, Chemo, renal failure, sickle cell, etc? DM, on immunosuppressants   5  PREGNANCY: Are you pregnant or did you recently give birth?  N/A  6  VACCINE: "Have you gotten the COVID-19 vaccine?" If Yes ask: "Which one, how many shots, when did you get it?" 5    Reason for Disposition  • [1] COVID-19 diagnosed by positive lab test (e g , PCR, rapid self-test kit) AND [2] mild symptoms (e g , cough, fever, others) AND [2] no complications or SOB    Protocols used: CORONAVIRUS (COVID-19) DIAGNOSED OR SUSPECTED-ADULT-OH

## 2022-10-19 NOTE — TELEPHONE ENCOUNTER
Regarding: COVID postive, appointment request   ----- Message from Teri Kim RN sent at 10/19/2022  8:16 AM EDT -----  " I tested positive for COVID and I think I should see my doctor so I can get treated with one of those anti-COVID drugs  "

## 2022-10-19 NOTE — TELEPHONE ENCOUNTER
Patient tested positive for COVID and would like to be seen via virtual appointment  His appointment is scheduled for tomorrow at 1430  He would like a call if something sooner becomes available  He is diabetic and on immunosuppressants

## 2022-10-20 ENCOUNTER — PATIENT MESSAGE (OUTPATIENT)
Dept: INTERNAL MEDICINE CLINIC | Facility: CLINIC | Age: 57
End: 2022-10-20

## 2022-10-20 ENCOUNTER — TELEMEDICINE (OUTPATIENT)
Dept: INTERNAL MEDICINE CLINIC | Facility: CLINIC | Age: 57
End: 2022-10-20
Payer: COMMERCIAL

## 2022-10-20 DIAGNOSIS — U07.1 COVID-19 VIRUS INFECTION: Primary | ICD-10-CM

## 2022-10-20 DIAGNOSIS — Z79.4 TYPE 2 DIABETES MELLITUS WITH HYPERGLYCEMIA, WITH LONG-TERM CURRENT USE OF INSULIN (HCC): ICD-10-CM

## 2022-10-20 DIAGNOSIS — D89.89 IGG4 RELATED DISEASE (HCC): ICD-10-CM

## 2022-10-20 DIAGNOSIS — Z79.899 LONG TERM CURRENT USE OF IMMUNOSUPPRESSIVE DRUG: ICD-10-CM

## 2022-10-20 DIAGNOSIS — E11.65 TYPE 2 DIABETES MELLITUS WITH HYPERGLYCEMIA, WITH LONG-TERM CURRENT USE OF INSULIN (HCC): ICD-10-CM

## 2022-10-20 PROCEDURE — 99213 OFFICE O/P EST LOW 20 MIN: CPT | Performed by: INTERNAL MEDICINE

## 2022-10-20 RX ORDER — NIRMATRELVIR AND RITONAVIR 300-100 MG
3 KIT ORAL 2 TIMES DAILY
Qty: 30 TABLET | Refills: 0 | Status: SHIPPED | OUTPATIENT
Start: 2022-10-20 | End: 2022-10-25

## 2022-10-20 NOTE — PROGRESS NOTES
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[address] )    Long term current use of immunosuppressive drug    Type 2 diabetes mellitus with hyperglycemia, with long-term current use of insulin ([facility] [address] )      Other Visit Diagnoses     COVID-19 virus infection    -  Primary    Relevant Medications    nirmatrelvir & ritonavir (Paxlovid, 300/100,) tablet therapy pack         Disposition:     Patient with moderate or severe illness or has a weakened immune system  They should isolate from others through at least day 10  Isolation can be ended if symptoms are improving and they are fever free for the past 24 hours  If they still have fever or other symptoms have not improved, continue to isolate until they improve  Regardless of when you isolation is ended, avoid being around people who are more likely to get very sick from COVID-19 until at least day 11  If COVID symptoms worsen after ending isolation, restart isolation at day 0  Discussed symptom directed medication options with patient  Nixon is overall stable  He was prescribed paxlovid  He will isolate as recommended  Additional information was sent to him via [employer]  He will notify his [place] rheumatologist about being COVID-19 positive as he is scheduled to receive rituximab again on November 17th  He will call if symptoms do not improve as expected or if they worsen  All questions answered/addressed    Patient meets criteria for PAXLOVID and they have been counseled appropriately according to EUA documentation released by the FDA  After discussion, patient agrees to treatment      [address] is an investigational medicine used to treat mild-to-moderate COVID-19 in adults and children (15years of age and older weighing at least 80 pounds (40 kg)) with positive results of direct SARS-CoV-2 viral testing, and who are at high risk for progression to severe COVID-19, including hospitalization or death  PAXLOVID is investigational because it is still being studied  There is limited information about the safety and effectiveness of using PAXLOVID to treat people with mild-to-moderate COVID-19  The FDA has authorized the emergency use of PAXLOVID for the treatment of mild-tomoderate COVID-19 in adults and children (15years of age and older weighing at least 80 pounds (40 kg)) with a positive test for the virus that causes COVID-19, and who are at high risk for progression to severe COVID-19, including hospitalization or death, under an EUA  What should I tell my healthcare provider before I take PAXLOVID? Tell your healthcare provider if you:  - Have any allergies  - Have liver or kidney disease  - Are pregnant or plan to become pregnant  - Are breastfeeding a child  - Have any serious illnesses    Tell your healthcare provider about all the medicines you take, including prescription and over-the-counter medicines, vitamins, and herbal supplements  Some medicines may interact with PAXLOVID and may cause serious side effects  Keep a list of your medicines to show your healthcare provider and pharmacist when you get a new medicine  You can ask your healthcare provider or pharmacist for a list of medicines that interact with PAXLOVID  Do not start taking a new medicine without telling your healthcare provider  Your healthcare provider can tell you if it is safe to take PAXLOVID with other medicines  Tell your healthcare provider if you are taking combined hormonal contraceptive  PAXLOVID may affect how your birth control pills work  Females who are able to become pregnant should use another effective alternative form of contraception or an additional barrier method of contraception  Talk to your healthcare provider if you have any questions about contraceptive methods that might be right for you  How do I take PAXLOVID?     PAXLOVID consists of 2 medicines: nirmatrelvir and ritonavir  - Take 2 pink tablets of nirmatrelvir with 1 white tablet of ritonavir by mouth 2 times each day (in the morning and in the evening) for 5 days  For each dose, take all 3 tablets at the same time  - If you have kidney disease, talk to your healthcare provider  You may need a different dose  - Swallow the tablets whole  Do not chew, break, or crush the tablets  - Take PAXLOVID with or without food  - Do not stop taking PAXLOVID without talking to your healthcare provider, even if you feel better  - If you miss a dose of PAXLOVID within 8 hours of the time it is usually taken, take it as soon as you remember  If you miss a dose by more than 8 hours, skip the missed dose and take the next dose at your regular time  Do not take 2 doses of PAXLOVID at the same time  - If you take too much PAXLOVID, call your healthcare provider or go to the nearest hospital emergency room right away  - If you are taking a ritonavir- or cobicistat-containing medicine to treat hepatitis C or Human Immunodeficiency Virus (HIV), you should continue to take your medicine as prescribed by your healthcare provider   - Talk to your healthcare provider if you do not feel better or if you feel worse after 5 days  Who should generally not take PAXLOVID? Do not take PAXLOVID if:  You are allergic to nirmatrelvir, ritonavir, or any of the ingredients in PAXLOVID      You are taking any of the following medicines:  - Alfuzosin  - Pethidine, piroxicam, propoxyphene  - Ranolazine  - Amiodarone, dronedarone, flecainide, propafenone, quinidine  - Colchicine  - Lurasidone, pimozide, clozapine  - Dihydroergotamine, ergotamine, methylergonovine  - Lovastatin, simvastatin  - Sildenafil (Revatio®) for pulmonary arterial hypertension (PAH)  - Triazolam, oral midazolam  - Apalutamide  - Carbamazepine, phenobarbital, phenytoin  - Rifampin  - St  Rayo’s Wort (hypericum perforatum)    What are the important possible side effects of PAXLOVID? Possible side effects of PAXLOVID are:  - Liver Problems  Tell your healthcare provider right away if you have any of these signs and symptoms of liver problems: loss of appetite, yellowing of your skin and the whites of eyes (jaundice), dark-colored urine, pale colored stools and itchy skin, stomach area (abdominal) pain  - Resistance to HIV Medicines  If you have untreated HIV infection, PAXLOVID may lead to some HIV medicines not working as well in the future  - Other possible side effects include: altered sense of taste, diarrhea, high blood pressure, or muscle aches    These are not all the possible side effects of PAXLOVID  Not many people have taken PAXLOVID  Serious and unexpected side effects may happen  [address] is still being studied, so it is possible that all of the risks are not known at this time  What other treatment choices are there? Like [employer] may allow for the emergency use of other medicines to treat people with COVID-19  Go to https://[employer]/ for information on the emergency use of other medicines that are authorized by FDA to treat people with COVID-19  Your healthcare provider may talk with you about clinical trials for which you may be eligible  It is your choice to be treated or not to be treated with PAXLOVID  Should you decide not to receive it or for your child not to receive it, it will not change your standard medical care  What if I am pregnant or breastfeeding? There is no experience treating pregnant women or breastfeeding mothers with PAXLOVID  For a mother and unborn baby, the benefit of taking PAXLOVID may be greater than the risk from the treatment  If you are pregnant, discuss your options and specific situation with your healthcare provider      It is recommended that you use effective barrier contraception or do not have sexual activity while taking PAXLOVID  If you are breastfeeding, discuss your options and specific situation with your healthcare provider  How do I report side effects with PAXLOVID? Contact your healthcare provider if you have any side effects that bother you or do not go away  Report side effects to FDA MedWatch at www fda gov/medwatch or call 1-144-BDA1036 or you can report side effects to [facility]  at the contact information provided below  Website Fax number Telephone number   [employer] [phone]     How should I store [employer]? Store PAXLOVID tablets at room temperature between 68°F to 77°F (20°C to 25°C)  Full fact sheet for patients, parents, and caregivers can be found at: [employer]    I have spent 20 minutes directly with the patient  Greater than 50% of this time was spent in counseling/coordination of care regarding: risks and benefits of treatment options, instructions for management, patient and family education and impressions  Encounter provider: Shandra Paul DO     Provider located at: [address] [place], [place] [phone]     Recent Visits  No visits were found meeting these conditions  Showing recent visits within past 7 days and meeting all other requirements  Today's Visits  Date Type Provider Dept   10/20/22 Telemedicine Shandra Paul, [address] [place] [address]   Showing today's visits and meeting all other requirements  Future Appointments  No visits were found meeting these conditions  Showing future appointments within next 150 days and meeting all other requirements     This virtual check-in was done via [facility] and patient was informed that this is a secure, HIPAA-compliant platform  He agrees to proceed      Patient agrees to participate in a virtual check in via telephone or video visit instead of presenting to the office to address urgent/immediate medical needs  Patient is aware this is a billable service  He acknowledged consent and understanding of privacy and security of the video platform  The patient has agreed to participate and understands they can discontinue the visit at any time  After connecting through [facility], the patient was identified by name and date of birth  Shannan Francis was informed that this was a telemedicine visit and that the exam was being conducted confidentially over secure lines  My office door was closed  No one else was in the room  Shannan Francis acknowledged consent and understanding of privacy and security of the telemedicine visit  I informed the patient that I have reviewed his record in Epic and presented the opportunity for him to ask any questions regarding the visit today  The patient agreed to participate  Verification of patient location:  Patient is located in the following state in which I hold an active license: [place]    Subjective:   Shannan Francis is a 59 y o  male who has been screened for COVID-19  Symptom change since last report: improving  Patient's symptoms include fever, malaise, sore throat, cough, diarrhea, myalgias and headache  Patient denies anosmia, loss of taste and shortness of breath  - Date of symptom onset: 10/17/2022  - Date of exposure: 10/16/2022  - Date of positive COVID-19 test: 10/18/2022  Type of test: Home antigen  Patient with typical symptoms of COVID-19 and they attest that they were positive on home rapid antigen testing  Image of positive result is not able to be uploaded into their chart  COVID-19 vaccination status: Fully vaccinated with booster    Estle Man has been staying home and has isolated themselves in his home  He is taking care to not share personal items and is cleaning all surfaces that are touched often, like counters, tabletops, and doorknobs using household cleaning sprays or wipes   He is wearing a mask when he leaves his room  Nixon tested positive this week for COVID-19 after traveling to [place] over this past weekend  He is isolating at home and treating his symptoms with OTC remedies  He had Fever(Tmax 100 1F) and a bad headache, sore throat yesterday but these and some of his other symptoms have improved today  He has not had COVID-19 infection in the past   He is vaccinated and booster(5 doses)  He is immunosuppressed as he takes methotrexate and rituximab for IgG4 related disease  He rec'd [facility] in July 2022  He is interested in paxlovid as a treatment option  He is aware all anti-viral options are not FDA approved but under EUA from the FDA  ROS Otherwise negative, no other complaints  Lab Results   Component Value Date    SARSCOV2 Not Detected 07/02/2020    [facility] Not Detected 09/25/2022       Review of Systems   Constitutional: Positive for fever  HENT: Positive for sore throat  Negative for sinus pressure and sinus pain  Respiratory: Positive for cough  Negative for shortness of breath  Gastrointestinal: Positive for diarrhea  Musculoskeletal: Positive for myalgias  Allergic/Immunologic: Positive for immunocompromised state  Neurological: Positive for headaches       Current Outpatient Medications on File Prior to Visit   Medication Sig   • Ascorbic Acid (VITAMIN C) 1000 MG tablet Take 1,000 mg by mouth daily (Patient not taking: No sig reported)   • b complex vitamins tablet Take 1 tablet by mouth daily   • B-D ULTRAFINE III SHORT PEN 31G X 8 MM MISC use 1 PEN NEEDLE to inject MEDICATION subcutaneously twice a day   • Cyanocobalamin (VITAMIN B12 PO) Take 1 tablet by mouth daily   • Diclofenac Sodium (VOLTAREN) 1 % Apply 2 g topically 2 (two) times a day as needed (knee pain/OA) (Patient not taking: No sig reported)   • Droplet Pen Needles 31G X 8 MM MISC USE 1 PEN NEDDLE FOR INSULIN INJECTION SUBCUTANEOUSLY TWICE A DAY   • famotidine (PEPCID) 40 MG tablet Take 1 tablet (40 mg total) by mouth 2 (two) times a day   • FLUTICASONE PROPIONATE, INHAL, IN Inhale 2 sprays 2 (two) times a day   • folic acid (FOLVITE) 1 mg tablet Take 1 mg by mouth daily   • glucose blood (OneTouch Ultra) test strip Check up to 3 times daily as needed before meals and at bedtime  • latanoprost (XALATAN) 0 005 % ophthalmic solution 1 drop daily at bedtime   • lisinopril (ZESTRIL) 20 mg tablet Take 1 tablet (20 mg total) by mouth daily   • metFORMIN (GLUCOPHAGE) 1000 MG tablet Take 1 tablet (1,000 mg total) by mouth 2 (two) times a day with meals   • methotrexate 2 5 mg tablet    • Multiple Vitamins-Minerals (PRESERVISION AREDS PO) Take 1 tablet by mouth daily   • multivitamin (THERAGRAN) TABS Take 1 tablet by mouth daily   • mupirocin (BACTROBAN) 2 % ointment DILUTE 1 INCH OF MUPIROCIN OINT IN 8 OZ  OF SALINE IRRIGATE EACH NOSTRIL WITH 4 OZ  TWICE A DAY   • NovoLOG Mix 70/30 FlexPen (70-30) 100 units/mL injection pen Inject 7units subcut just prior to breakfast and just prior to dinner  • Omega-3 Fatty Acids (FISH OIL PO) Take 1 capsule by mouth daily   • riTUXimab (RITUXAN) injection Infuse into a venous catheter    • rosuvastatin (CRESTOR) 10 MG tablet Take 1 tablet (10 mg total) by mouth daily   • Vitamin D, Cholecalciferol, 1000 units CAPS Take 1 tablet by mouth 2 (two) times a day        Objective: There were no vitals taken for this visit  Physical Exam  Constitutional:       General: He is not in acute distress  Appearance: He is ill-appearing  Eyes:      Conjunctiva/sclera: Conjunctivae normal    Pulmonary:      Effort: Pulmonary effort is normal  No respiratory distress  Neurological:      Mental Status: He is alert     Psychiatric:         Mood and Affect: Mood normal          Behavior: Behavior normal        Jose Alvarenga DO

## 2022-11-09 ENCOUNTER — FOLLOW UP (OUTPATIENT)
Dept: URBAN - METROPOLITAN AREA CLINIC 6 | Facility: CLINIC | Age: 57
End: 2022-11-09

## 2022-11-09 DIAGNOSIS — H04.123: ICD-10-CM

## 2022-11-09 DIAGNOSIS — H40.1131: ICD-10-CM

## 2022-11-09 DIAGNOSIS — H25.813: ICD-10-CM

## 2022-11-09 PROCEDURE — 92083 EXTENDED VISUAL FIELD XM: CPT

## 2022-11-09 PROCEDURE — 92012 INTRM OPH EXAM EST PATIENT: CPT

## 2022-11-09 ASSESSMENT — TONOMETRY
OS_IOP_MMHG: 13
OD_IOP_MMHG: 15

## 2022-11-09 ASSESSMENT — VISUAL ACUITY
OS_CC: (L)20/25+1
OD_CC: (L)20/400

## 2022-11-11 ENCOUNTER — APPOINTMENT (OUTPATIENT)
Dept: LAB | Facility: CLINIC | Age: 57
End: 2022-11-11

## 2022-11-11 DIAGNOSIS — Z79.4 TYPE 2 DIABETES MELLITUS WITH HYPERGLYCEMIA, WITH LONG-TERM CURRENT USE OF INSULIN (HCC): ICD-10-CM

## 2022-11-11 DIAGNOSIS — E11.65 TYPE 2 DIABETES MELLITUS WITH HYPERGLYCEMIA, WITH LONG-TERM CURRENT USE OF INSULIN (HCC): ICD-10-CM

## 2022-11-11 LAB
ANION GAP SERPL CALCULATED.3IONS-SCNC: 7 MMOL/L (ref 4–13)
BUN SERPL-MCNC: 11 MG/DL (ref 5–25)
CALCIUM SERPL-MCNC: 9.3 MG/DL (ref 8.3–10.1)
CHLORIDE SERPL-SCNC: 96 MMOL/L (ref 96–108)
CO2 SERPL-SCNC: 26 MMOL/L (ref 21–32)
CREAT SERPL-MCNC: 0.94 MG/DL (ref 0.6–1.3)
EST. AVERAGE GLUCOSE BLD GHB EST-MCNC: 174 MG/DL
GFR SERPL CREATININE-BSD FRML MDRD: 88 ML/MIN/1.73SQ M
GLUCOSE P FAST SERPL-MCNC: 173 MG/DL (ref 65–99)
HBA1C MFR BLD: 7.7 %
POTASSIUM SERPL-SCNC: 4.5 MMOL/L (ref 3.5–5.3)
SODIUM SERPL-SCNC: 129 MMOL/L (ref 135–147)

## 2022-12-05 ENCOUNTER — OFFICE VISIT (OUTPATIENT)
Dept: ENDOCRINOLOGY | Facility: CLINIC | Age: 57
End: 2022-12-05

## 2022-12-05 VITALS
BODY MASS INDEX: 24.34 KG/M2 | DIASTOLIC BLOOD PRESSURE: 80 MMHG | SYSTOLIC BLOOD PRESSURE: 130 MMHG | HEIGHT: 70 IN | WEIGHT: 170 LBS | HEART RATE: 82 BPM | OXYGEN SATURATION: 98 %

## 2022-12-05 DIAGNOSIS — Z79.4 TYPE 2 DIABETES MELLITUS WITH HYPERGLYCEMIA, WITH LONG-TERM CURRENT USE OF INSULIN (HCC): Primary | ICD-10-CM

## 2022-12-05 DIAGNOSIS — E11.65 TYPE 2 DIABETES MELLITUS WITH HYPERGLYCEMIA, WITH LONG-TERM CURRENT USE OF INSULIN (HCC): Primary | ICD-10-CM

## 2022-12-05 DIAGNOSIS — Z79.4 CURRENT USE OF INSULIN (HCC): ICD-10-CM

## 2022-12-05 RX ORDER — INSULIN ASPART 100 [IU]/ML
INJECTION, SUSPENSION SUBCUTANEOUS
Qty: 15 ML | Refills: 1 | Status: SHIPPED | OUTPATIENT
Start: 2022-12-05

## 2022-12-05 RX ORDER — OSELTAMIVIR PHOSPHATE 75 MG/1
CAPSULE ORAL
COMMUNITY

## 2022-12-05 RX ORDER — INSULIN ASPART 100 [IU]/ML
INJECTION, SUSPENSION SUBCUTANEOUS
COMMUNITY
End: 2022-12-05 | Stop reason: SDUPTHER

## 2022-12-05 RX ORDER — DOXYCYCLINE 100 MG/1
100 TABLET ORAL 2 TIMES DAILY
COMMUNITY
Start: 2022-09-25

## 2022-12-05 NOTE — PROGRESS NOTES
Established Patient Progress Note      Chief Complaint   Patient presents with   • Diabetes Type 2      Referring Provider  No referring provider defined for this encounter  History of Present Illness:   Law Culver is a 59 y o  male with a history of type 2 diabetes, last seen in 8/2022  He has a Dexcom but this is reading lower then the A1c  Since his last visit, he had COVID in Oct 2022  Bgs were elevated  He also ahd a steroid       Diabetes hx:   He was dx with T2DM in 2002, but started insulin in 2016  He has been seen by GI for idiopathic pancreatitis  He recalls one discrete episode of pancreatitis, but also several times when he had back pain         He was taking Janumet and glimepiride initially, but was changed to Novolog 70/30 after the episode of pancreatitis    Also, 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  He follows closely with [facility] ([facility])  For maintenance, he gets Rituximab and methotrexate  He is getting Rituximab every 6mos  Bgs were elevated with steroids        He works as an , but tried to follow a very good effort  Current regimen: Novolog 70/30 7units bf and 7units dinner and metformin 1g twice daily   Of note, he had a lower c peptide in 12/2020 and has not tried SGLT2, though it was discussed today and he is unsure  Exercise has increased- on treadmill in winter  Eyes: Nov 2022, Dr Bailey Hammond monitoring for glaucoma ; also seen [facility]  Pod: self care also seen in Aug 2022 (Dr Reji Givens) has orthotics      He does take lisinopril and simvastatin without issues  Will exercise rabia after dinner    Lizeth Bailey   Device used   Home use     Indication   Type 2 Diabetes    More than 72 hours of data was reviewed  Report to be scanned to chart       Date Range: 11/22/2022-12/5/22    Analysis of data:   Average Glucose: 161  Coefficient of Variation: n/a  SD : 33mg/dL  Time in Target Range: 77%  Time Above Range: 21% 2% v high  Time Below Range: 0     Interpretation of data:   Still with mild excursions after meals  No hypoglycemia seen  Time in Range has decreased somewhat  Exercise does improve BGs  Patient Active Problem List   Diagnosis   • Gastroesophageal reflux disease without esophagitis   • 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[place] [address] )   • Idiopathic acute pancreatitis without infection or necrosis   • Mixed hyperlipidemia   • Type 2 diabetes mellitus with hyperglycemia, with long-term current use of insulin ([facility])   • Essential hypertension   • Current use of insulin ([facility])   • DARYL (obstructive sleep apnea)   • Long term current use of immunosuppressive drug      Past Medical History:   Diagnosis Date   • 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[place] [address] )    • Diabetes mellitus ([place] [address] )     blood sugar 121 @ 0600   • GERD (gastroesophageal reflux disease)    • 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[place] [address] )    • Sarcoid       Past Surgical History:   Procedure Laterality Date   • COLONOSCOPY     • ESOPHAGOGASTRODUODENOSCOPY N/A 4/2/2019    Procedure: ESOPHAGOGASTRODUODENOSCOPY (EGD); Surgeon: Jyoti Shanks MD;  Location: [facility] GI LAB; Service: Gastroenterology   • LYMPH NODE BIOPSY     • LYMPH NODE DISSECTION  2009, left axilla    benign on pathology   • OPTIC NERVE DECOMPRESSION     • MO ESOPHAGOGASTRODUODENOSCOPY TRANSORAL DIAGNOSTIC N/A 5/26/2017    Procedure: EGD AND COLONOSCOPY;  Surgeon: Jyoti Shanks MD;  Location:  GI LAB;   Service: Gastroenterology   • SINUS SURGERY     • SINUS SURGERY     • WISDOM TOOTH EXTRACTION        Family History   Problem Relation Age of Onset   • Alzheimer's disease Mother    • Hypertension Mother    • Osteoporosis Mother    • Hypertension Father    • Heart attack Father    • Osteoporosis Sister    • Diabetes Brother    • Diabetes type II Brother    • Hyperthyroidism Brother    • Diabetes type II Paternal Aunt      Social History     Tobacco Use   • Smoking status: Former     Packs/day: 1 00     Years: 25 00 Pack years: 25 00     Types: Cigarettes     Quit date: 2016     Years since quittin 6   • Smokeless tobacco: Never   Substance Use Topics   • Alcohol use: Not Currently     Comment: rarely     Allergies   Allergen Reactions   • Cefaclor Hives and Rash     Tolerate amoxicillin  Tolerate amoxicillin         Current Outpatient Medications:   •  b complex vitamins tablet, Take 1 tablet by mouth daily, Disp: , Rfl:   •  Cyanocobalamin (VITAMIN B12 PO), Take 1 tablet by mouth daily, Disp: , Rfl:   •  Droplet Pen Needles 31G X 8 MM MISC, USE 1 PEN NEDDLE FOR INSULIN INJECTION SUBCUTANEOUSLY TWICE A DAY, Disp: 100 each, Rfl: 3  •  famotidine (PEPCID) 40 MG tablet, Take 1 tablet (40 mg total) by mouth 2 (two) times a day, Disp: 180 tablet, Rfl: 3  •  FLUTICASONE PROPIONATE, INHAL, IN, Inhale 2 sprays 2 (two) times a day, Disp: , Rfl:   •  folic acid (FOLVITE) 1 mg tablet, Take 1 mg by mouth daily, Disp: , Rfl:   •  glucose blood (OneTouch Ultra) test strip, Check up to 3 times daily as needed before meals and at bedtime  , Disp: 300 strip, Rfl: 2  •  glucose blood test strip, onetouch     calvin ultra, Disp: , Rfl:   •  latanoprost (XALATAN) 0 005 % ophthalmic solution, 1 drop daily at bedtime, Disp: , Rfl:   •  lisinopril (ZESTRIL) 20 mg tablet, Take 1 tablet (20 mg total) by mouth daily, Disp: 90 tablet, Rfl: 1  •  metFORMIN (GLUCOPHAGE) 1000 MG tablet, Take 1 tablet (1,000 mg total) by mouth 2 (two) times a day with meals, Disp: 60 tablet, Rfl: 5  •  methotrexate 2 5 mg tablet, 15 mg, Disp: , Rfl:   •  Multiple Vitamins-Minerals (PRESERVISION AREDS PO), Take 1 tablet by mouth daily, Disp: , Rfl:   •  multivitamin (THERAGRAN) TABS, Take 1 tablet by mouth daily, Disp: , Rfl:   •  mupirocin (BACTROBAN) 2 % ointment, DILUTE 1 INCH OF MUPIROCIN OINT IN 8 OZ  OF SALINE IRRIGATE EACH NOSTRIL WITH 4 OZ   TWICE A DAY, Disp: , Rfl:   •  NovoLOG Mix 70/30 FlexPen (70-30) 100 units/mL injection pen, Inject 7units subcut just prior to breakfast and just prior to dinner , Disp: 15 mL, Rfl: 1  •  Omega-3 Fatty Acids (FISH OIL PO), Take 1 capsule by mouth daily, Disp: , Rfl:   •  riTUXimab (RITUXAN) injection, Infuse into a venous catheter , Disp: , Rfl:   •  rosuvastatin (CRESTOR) 10 MG tablet, Take 1 tablet (10 mg total) by mouth daily, Disp: 90 tablet, Rfl: 1  •  Vitamin D, Cholecalciferol, 1000 units CAPS, Take 1 tablet by mouth 2 (two) times a day , Disp: , Rfl:   •  Ascorbic Acid (VITAMIN C) 1000 MG tablet, Take 1,000 mg by mouth daily (Patient not taking: Reported on 8/2/2022), Disp: , Rfl:   •  Diclofenac Sodium (VOLTAREN) 1 %, Apply 2 g topically 2 (two) times a day as needed (knee pain/OA) (Patient not taking: Reported on 8/5/2021), Disp: 1 Tube, Rfl: 0  •  doxycycline (ADOXA) 100 MG tablet, Take 100 mg by mouth 2 (two) times a day (Patient not taking: Reported on 12/5/2022), Disp: , Rfl:   •  oseltamivir (TAMIFLU) 75 mg capsule, Tamiflu 75 mg capsule (Patient not taking: Reported on 12/5/2022), Disp: , Rfl:   Review of Systems   Constitutional: Negative for unexpected weight change  HENT: Negative for voice change  Eyes: Negative for visual disturbance  Respiratory: Negative for cough and shortness of breath  Cardiovascular: Negative for chest pain and palpitations  Gastrointestinal: Negative for constipation and diarrhea  Endocrine: Negative for polydipsia and polyuria  Musculoskeletal: Negative for gait problem  Neurological: Negative for tremors  Psychiatric/Behavioral: The patient is not nervous/anxious  Physical Exam:  Body mass index is 24 39 kg/m²    /80 (BP Location: Right arm, Patient Position: Sitting, Cuff Size: Standard)   Pulse 82   Ht 5' 10" (1 778 m)   Wt 77 1 kg (170 lb)   SpO2 98%   BMI 24 39 kg/m²    Wt Readings from Last 3 Encounters:   12/05/22 77 1 kg (170 lb)   08/09/22 77 1 kg (170 lb)   08/09/22 77 1 kg (170 lb)         Physical Exam     Physical Exam   Gen: appears well-developed and well-nourished  No apparent distress  Head: Normocephalic and atraumatic  Eyes: no stare or proptosis, no periorbital edema  E/N/M mask in place, hearing grossly intact  Neck: range of motion nl  Pulmonary/Chest: breathing  comfortably, no accessory muscle use, effort normal    Musculoskeletal: moves all extremities, gait nl  Neurological: alert and oriented to person, place, and time  No upper ext tremor appreciated  Skin: does not appear diaphoretic, no facial plethora  Psychiatric: normal mood and affect; behavior is normal; no gross lapses in memory, answer questions appropriately            Labs:     Lab Results   Component Value Date    HGBA1C 7 7 (H) 11/11/2022         Lab Results   Component Value Date    CREATININE 0 94 11/11/2022    CREATININE 0 88 02/04/2022    CREATININE 0 91 09/24/2021    BUN 11 11/11/2022    K 4 5 11/11/2022    CL 96 11/11/2022    CO2 26 11/11/2022     eGFR   Date Value Ref Range Status   11/11/2022 88 ml/min/1 73sq m Final         Lab Results   Component Value Date    HDL 59 07/18/2022    TRIG 108 07/18/2022       Lab Results   Component Value Date    ALT 46 03/24/2022    AST 25 03/24/2022    ALKPHOS 83 03/24/2022           Impression:  1  Type 2 diabetes mellitus with hyperglycemia, with long-term current use of insulin ([facility] )    2  Current use of insulin ([facility] )           Plan:    Gianni Hwang was seen today for diabetes type 2  Diagnoses and all orders for this visit:    Type 2 diabetes mellitus with hyperglycemia, with long-term current use of insulin (HCC)  -     Microalbumin / creatinine urine ratio; Future  -     Hemoglobin A1C; Future  -     Comprehensive metabolic panel Lab Collect; Future  -     metFORMIN (GLUCOPHAGE) 1000 MG tablet; Take 1 tablet (1,000 mg total) by mouth 2 (two) times a day with meals  -     NovoLOG Mix 70/30 FlexPen (70-30) 100 units/mL injection pen; Inject 7units subcut just prior to breakfast and just prior to dinner      Current use of insulin (HCC)  -     Microalbumin / creatinine urine ratio; Future  -     Hemoglobin A1C; Future  -     Comprehensive metabolic panel Lab Collect; Future  -     metFORMIN (GLUCOPHAGE) 1000 MG tablet; Take 1 tablet (1,000 mg total) by mouth 2 (two) times a day with meals  -     NovoLOG Mix 70/30 FlexPen (70-30) 100 units/mL injection pen; Inject 7units subcut just prior to breakfast and just prior to dinner  1  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

## 2022-12-30 ENCOUNTER — TELEPHONE (OUTPATIENT)
Dept: FAMILY MEDICINE CLINIC | Facility: CLINIC | Age: 57
End: 2022-12-30

## 2022-12-30 NOTE — TELEPHONE ENCOUNTER
Please call the patient(Nixon):     The infusion is not as effective in preventing COVID anymore with the new variants    If his [facility]ologist wants him to get the infusion still, I will re-order    Let me know

## 2022-12-30 NOTE — TELEPHONE ENCOUNTER
Infusion PEP covid was canceled     He got his 1st infusion 07/06  But the 2nd order was discontinued  Was this a mistake?   Because he has an upcoming appt this tuesday    Call [phone] infusion in [facility]

## 2023-01-03 ENCOUNTER — HOSPITAL ENCOUNTER (OUTPATIENT)
Dept: INFUSION CENTER | Facility: CLINIC | Age: 58
End: 2023-01-03

## 2023-01-30 LAB
LEFT EYE DIABETIC RETINOPATHY: NORMAL
RIGHT EYE DIABETIC RETINOPATHY: NORMAL

## 2023-02-01 DIAGNOSIS — Z79.4 TYPE 2 DIABETES MELLITUS WITH HYPERGLYCEMIA, WITH LONG-TERM CURRENT USE OF INSULIN (HCC): ICD-10-CM

## 2023-02-01 DIAGNOSIS — E11.65 TYPE 2 DIABETES MELLITUS WITH HYPERGLYCEMIA, WITH LONG-TERM CURRENT USE OF INSULIN (HCC): ICD-10-CM

## 2023-02-01 RX ORDER — PEN NEEDLE, DIABETIC 31 GX5/16"
NEEDLE, DISPOSABLE MISCELLANEOUS
Qty: 200 EACH | Refills: 1 | Status: SHIPPED | OUTPATIENT
Start: 2023-02-01

## 2023-02-06 ENCOUNTER — DOCUMENTATION (OUTPATIENT)
Dept: ENDOCRINOLOGY | Facility: CLINIC | Age: 58
End: 2023-02-06

## 2023-02-06 ENCOUNTER — TELEPHONE (OUTPATIENT)
Dept: ENDOCRINOLOGY | Facility: CLINIC | Age: 58
End: 2023-02-06

## 2023-02-06 NOTE — PROGRESS NOTES
Detailed written order completed and faxed with the last chart note to Rajendra Cheema Dr   Fax number is [phone]

## 2023-02-07 ENCOUNTER — OFFICE VISIT (OUTPATIENT)
Dept: PODIATRY | Facility: CLINIC | Age: 58
End: 2023-02-07

## 2023-02-07 VITALS
SYSTOLIC BLOOD PRESSURE: 122 MMHG | WEIGHT: 170 LBS | BODY MASS INDEX: 24.34 KG/M2 | DIASTOLIC BLOOD PRESSURE: 90 MMHG | HEIGHT: 70 IN

## 2023-02-07 DIAGNOSIS — M20.41 HAMMER TOES OF BOTH FEET: ICD-10-CM

## 2023-02-07 DIAGNOSIS — E11.65 TYPE 2 DIABETES MELLITUS WITH HYPERGLYCEMIA, WITH LONG-TERM CURRENT USE OF INSULIN (HCC): Primary | ICD-10-CM

## 2023-02-07 DIAGNOSIS — M21.42 PES PLANUS OF BOTH FEET: ICD-10-CM

## 2023-02-07 DIAGNOSIS — M19.072 PRIMARY OSTEOARTHRITIS OF LEFT FOOT: ICD-10-CM

## 2023-02-07 DIAGNOSIS — M20.42 HAMMER TOES OF BOTH FEET: ICD-10-CM

## 2023-02-07 DIAGNOSIS — Z79.4 TYPE 2 DIABETES MELLITUS WITH HYPERGLYCEMIA, WITH LONG-TERM CURRENT USE OF INSULIN (HCC): Primary | ICD-10-CM

## 2023-02-07 DIAGNOSIS — M21.41 PES PLANUS OF BOTH FEET: ICD-10-CM

## 2023-02-07 NOTE — PROGRESS NOTES
PATIENT:  Elian Garcia    1965      ASSESSMENT:     1  Type 2 diabetes mellitus with hyperglycemia, with long-term current use of insulin (HCC)  Device prior authorization      2  Pes planus of both feet        3  Primary osteoarthritis of left foot  Device prior authorization      4  Hammer toes of both feet  Device prior authorization          PLAN:  1  Patient was counseled on the condition and diagnosis  2   Educated disease prevention and risks related to diabetes  3   Educated proper daily foot care and exam   Instructed proper skin care / protection and footwear  Instructed to identify any signs of infection and related foot problem  4   The recent blood work was reviewed and discussed  The last HbA1c was 7 7  Discussed proper blood glucose control with diet and exercise  5   Continue orthotics and Voltaren gel  Will call his insurance for new orthotics  6   The patient will return in 1 year for diabetic foot exam         Subjective:      HPI  The patient presents for diabetic foot exam   BS is under control  No significant numbness or paresthesia  Denied weakness or significant functional deficit  Left foot pain has been much better now  No acute pedal problems  The following portions of the patient's history were reviewed and updated as appropriate: allergies, current medications, past family history, past medical history, past social history, past surgical history and problem list   All pertinent labs and images were reviewed  Past Medical History  Past Medical History:   Diagnosis Date   • 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[facility] [address] )    • Diabetes mellitus ([facility] [address] )     blood sugar 121 @ 0600   • GERD (gastroesophageal reflux disease)    • 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[email or web address] )    • Sarcoid        Past Surgical History  Past Surgical History:   Procedure Laterality Date   • COLONOSCOPY     • ESOPHAGOGASTRODUODENOSCOPY N/A 4/2/2019    Procedure: ESOPHAGOGASTRODUODENOSCOPY (EGD);   Surgeon: Conner Morley MD;  Location: [facility] GI LAB; Service: Gastroenterology   • LYMPH NODE BIOPSY     • LYMPH NODE DISSECTION  2009, left axilla    benign on pathology   • OPTIC NERVE DECOMPRESSION     • ME ESOPHAGOGASTRODUODENOSCOPY TRANSORAL DIAGNOSTIC N/A 5/26/2017    Procedure: EGD AND COLONOSCOPY;  Surgeon: Conner Morley MD;  Location:  GI LAB; Service: Gastroenterology   • SINUS SURGERY     • SINUS SURGERY     • WISDOM TOOTH EXTRACTION          Allergies:  Cefaclor    Medications:  Current Outpatient Medications   Medication Sig Dispense Refill   • b complex vitamins tablet Take 1 tablet by mouth daily     • Cyanocobalamin (VITAMIN B12 PO) Take 1 tablet by mouth daily     • Droplet Pen Needles 31G X 8 MM MISC USE 1 PEN NEEDLE FOR INSULIN INJECTION SUBCUTANEOUSLY TWICE A  each 1   • famotidine (PEPCID) 40 MG tablet Take 1 tablet (40 mg total) by mouth 2 (two) times a day 180 tablet 3   • FLUTICASONE PROPIONATE, INHAL, IN Inhale 2 sprays 2 (two) times a day     • folic acid (FOLVITE) 1 mg tablet Take 1 mg by mouth daily     • glucose blood (OneTouch Ultra) test strip Check up to 3 times daily as needed before meals and at bedtime  300 strip 2   • glucose blood test strip onetouch     calvin ultra     • latanoprost (XALATAN) 0 005 % ophthalmic solution 1 drop daily at bedtime     • lisinopril (ZESTRIL) 20 mg tablet take 1 tablet by mouth once daily 90 tablet 2   • metFORMIN (GLUCOPHAGE) 1000 MG tablet Take 1 tablet (1,000 mg total) by mouth 2 (two) times a day with meals 60 tablet 5   • methotrexate 2 5 mg tablet 15 mg     • Multiple Vitamins-Minerals (PRESERVISION AREDS PO) Take 1 tablet by mouth daily     • multivitamin (THERAGRAN) TABS Take 1 tablet by mouth daily     • mupirocin (BACTROBAN) 2 % ointment DILUTE 1 INCH OF MUPIROCIN OINT IN 8 OZ  OF SALINE IRRIGATE EACH NOSTRIL WITH 4 OZ   TWICE A DAY     • NovoLOG Mix 70/30 FlexPen (70-30) 100 units/mL injection pen Inject 7units subcut just prior to breakfast and just prior to dinner  15 mL 1   • Omega-3 Fatty Acids (FISH OIL PO) Take 1 capsule by mouth daily     • riTUXimab (RITUXAN) injection Infuse into a venous catheter      • rosuvastatin (CRESTOR) 10 MG tablet Take 1 tablet (10 mg total) by mouth daily 90 tablet 1   • Vitamin D, Cholecalciferol, 1000 units CAPS Take 1 tablet by mouth 2 (two) times a day      • Ascorbic Acid (VITAMIN C) 1000 MG tablet Take 1,000 mg by mouth daily (Patient not taking: Reported on 2022)     • Diclofenac Sodium (VOLTAREN) 1 % Apply 2 g topically 2 (two) times a day as needed (knee pain/OA) (Patient not taking: Reported on 2021) 1 Tube 0   • doxycycline (ADOXA) 100 MG tablet Take 100 mg by mouth 2 (two) times a day (Patient not taking: Reported on 2022)     • oseltamivir (TAMIFLU) 75 mg capsule Tamiflu 75 mg capsule (Patient not taking: Reported on 2022)       No current facility-administered medications for this visit         Social History:  Social History     Socioeconomic History   • Marital status: /Civil Union     Spouse name: None   • Number of children: None   • Years of education: None   • Highest education level: None   Occupational History   • Occupation:    Tobacco Use   • Smoking status: Former     Packs/day: 1 00     Years: 25 00     Pack years: 25 00     Types: Cigarettes     Quit date: 2016     Years since quittin 8   • Smokeless tobacco: Never   Vaping Use   • Vaping Use: Never used   Substance and Sexual Activity   • Alcohol use: Not Currently     Comment: rarely   • Drug use: Never   • Sexual activity: Yes     Partners: Female     Birth control/protection: None   Other Topics Concern   • None   Social History Narrative   • None     Social Determinants of Health     Financial Resource Strain: Not on file   Food Insecurity: Not on file   Transportation Needs: Not on file   Physical Activity: Not on file   Stress: Not on file   Social Connections: Not on file Intimate Partner Violence: Not on file   Housing Stability: Not on file        Review of Systems   Constitutional: Negative for appetite change, chills and fever  Respiratory: Negative  Cardiovascular: Negative  Gastrointestinal: Negative  Musculoskeletal: Negative for gait problem  Skin: Negative for wound  Neurological: Negative for weakness and numbness  Objective:      /90   Ht 5' 10" (1 778 m)   Wt 77 1 kg (170 lb)   BMI 24 39 kg/m²          Physical Exam  Vitals reviewed  Constitutional:       General: He is not in acute distress  Appearance: Normal appearance  He is not toxic-appearing  Cardiovascular:      Rate and Rhythm: Normal rate and regular rhythm  Pulses: no weak pulses          Dorsalis pedis pulses are 1+ on the right side and 2+ on the left side  Posterior tibial pulses are 2+ on the right side and 2+ on the left side  Pulmonary:      Effort: Pulmonary effort is normal  No respiratory distress  Musculoskeletal:         General: Deformity present  No swelling, tenderness or signs of injury  Right lower leg: No edema  Left lower leg: No edema  Right foot: Deformity present  No Charcot foot or foot drop  Left foot: Deformity present  No Charcot foot or foot drop  Comments: Mild pes planus presents  Flexible hammertoe presents  Mild hypertrophy of TMTJ left foot without pain  Feet:      Right foot:      Protective Sensation: 10 sites tested  10 sites sensed  Skin integrity: No ulcer, skin breakdown, erythema, warmth, callus or dry skin  Left foot:      Protective Sensation: 10 sites tested  10 sites sensed  Skin integrity: No ulcer, skin breakdown, erythema, warmth, callus or dry skin  Skin:     General: Skin is warm  Capillary Refill: Capillary refill takes less than 2 seconds  Coloration: Skin is not cyanotic or mottled  Findings: No abscess, ecchymosis, erythema, rash or wound  Nails: There is no clubbing  Neurological:      General: No focal deficit present  Mental Status: He is alert and oriented to person, place, and time  Cranial Nerves: No cranial nerve deficit  Sensory: No sensory deficit  Motor: No weakness  Coordination: Coordination normal       Gait: Gait normal       Deep Tendon Reflexes: Reflexes normal    Psychiatric:         Mood and Affect: Mood normal          Behavior: Behavior normal          Thought Content: Thought content normal          Judgment: Judgment normal              Diabetic Foot Exam    Patient's shoes and socks removed  Right Foot/Ankle   Right Foot Inspection  Skin Exam: skin normal and skin intact  No dry skin, no warmth, no callus, no erythema, no maceration, no abnormal color, no pre-ulcer, no ulcer and no callus  Toe Exam: right toe deformity  No swelling, no tenderness and erythema    Sensory   Vibration: intact  Proprioception: intact  Monofilament testing: intact    Vascular  Capillary refills: < 3 seconds  The right DP pulse is 1+  The right PT pulse is 2+  Left Foot/Ankle  Left Foot Inspection  Skin Exam: skin normal and skin intact  No dry skin, no warmth, no erythema, no maceration, normal color, no pre-ulcer, no ulcer and no callus  Toe Exam: left toe deformity  No swelling, no tenderness and no erythema  Sensory   Vibration: intact  Proprioception: intact  Monofilament testing: intact    Vascular  Capillary refills: < 3 seconds  The left DP pulse is 2+  The left PT pulse is 2+       Assign Risk Category  Deformity present  No loss of protective sensation  No weak pulses  Risk: 1

## 2023-03-06 DIAGNOSIS — E78.2 MIXED HYPERLIPIDEMIA: ICD-10-CM

## 2023-03-06 RX ORDER — ROSUVASTATIN CALCIUM 10 MG/1
10 TABLET, COATED ORAL DAILY
Qty: 90 TABLET | Refills: 1 | Status: SHIPPED | OUTPATIENT
Start: 2023-03-06

## 2023-03-24 ENCOUNTER — APPOINTMENT (OUTPATIENT)
Dept: LAB | Facility: CLINIC | Age: 58
End: 2023-03-24

## 2023-03-24 DIAGNOSIS — Z79.4 TYPE 2 DIABETES MELLITUS WITH HYPERGLYCEMIA, WITH LONG-TERM CURRENT USE OF INSULIN (HCC): ICD-10-CM

## 2023-03-24 DIAGNOSIS — Z79.4 CURRENT USE OF INSULIN (HCC): ICD-10-CM

## 2023-03-24 DIAGNOSIS — E11.65 TYPE 2 DIABETES MELLITUS WITH HYPERGLYCEMIA, WITH LONG-TERM CURRENT USE OF INSULIN (HCC): ICD-10-CM

## 2023-03-24 LAB
ALBUMIN SERPL BCP-MCNC: 4 G/DL (ref 3.5–5)
ALP SERPL-CCNC: 80 U/L (ref 46–116)
ALT SERPL W P-5'-P-CCNC: 31 U/L (ref 12–78)
ANION GAP SERPL CALCULATED.3IONS-SCNC: 4 MMOL/L (ref 4–13)
AST SERPL W P-5'-P-CCNC: 14 U/L (ref 5–45)
BILIRUB SERPL-MCNC: 0.43 MG/DL (ref 0.2–1)
BUN SERPL-MCNC: 15 MG/DL (ref 5–25)
CALCIUM SERPL-MCNC: 9.1 MG/DL (ref 8.3–10.1)
CHLORIDE SERPL-SCNC: 97 MMOL/L (ref 96–108)
CO2 SERPL-SCNC: 26 MMOL/L (ref 21–32)
CREAT SERPL-MCNC: 1 MG/DL (ref 0.6–1.3)
CREAT UR-MCNC: 95 MG/DL
EST. AVERAGE GLUCOSE BLD GHB EST-MCNC: 186 MG/DL
GFR SERPL CREATININE-BSD FRML MDRD: 82 ML/MIN/1.73SQ M
GLUCOSE P FAST SERPL-MCNC: 174 MG/DL (ref 65–99)
HBA1C MFR BLD: 8.1 %
MICROALBUMIN UR-MCNC: 6.3 MG/L (ref 0–20)
MICROALBUMIN/CREAT 24H UR: 7 MG/G CREATININE (ref 0–30)
POTASSIUM SERPL-SCNC: 4.6 MMOL/L (ref 3.5–5.3)
PROT SERPL-MCNC: 6.9 G/DL (ref 6.4–8.4)
SODIUM SERPL-SCNC: 127 MMOL/L (ref 135–147)

## 2023-04-17 ENCOUNTER — TELEPHONE (OUTPATIENT)
Dept: ADMINISTRATIVE | Facility: OTHER | Age: 58
End: 2023-04-17

## 2023-04-17 NOTE — TELEPHONE ENCOUNTER
----- Message from Alma Rosa Stephens sent at 4/17/2023  9:37 AM EDT -----  Regarding: HM DM EYE EXAM  04/17/23 9:37 AM    Hello, our patient Pete Burnette has had Diabetic Eye Exam completed/performed  Please assist in updating the patient chart, DM eye exam under media      Thank you,  Eda Robbins  [facility]

## 2023-04-21 ENCOUNTER — APPOINTMENT (OUTPATIENT)
Dept: LAB | Facility: CLINIC | Age: 58
End: 2023-04-21

## 2023-04-21 DIAGNOSIS — Z79.4 TYPE 2 DIABETES MELLITUS WITH HYPERGLYCEMIA, WITH LONG-TERM CURRENT USE OF INSULIN (HCC): ICD-10-CM

## 2023-04-21 DIAGNOSIS — E11.65 TYPE 2 DIABETES MELLITUS WITH HYPERGLYCEMIA, WITH LONG-TERM CURRENT USE OF INSULIN (HCC): ICD-10-CM

## 2023-04-22 LAB — C PEPTIDE SERPL-MCNC: 1.4 NG/ML (ref 1.1–4.4)

## 2023-04-27 NOTE — TELEPHONE ENCOUNTER
Upon review of the In Basket request we were able to locate, review, and update the patient chart as requested for Diabetic Eye Exam     Any additional questions or concerns should be emailed to the Practice Liaisons via the appropriate education email address, please do not reply via In Basket      Thank you  Dimitri Acosta MA

## 2023-05-01 DIAGNOSIS — E11.65 TYPE 2 DIABETES MELLITUS WITH HYPERGLYCEMIA, WITH LONG-TERM CURRENT USE OF INSULIN (HCC): Primary | ICD-10-CM

## 2023-05-01 DIAGNOSIS — Z79.4 TYPE 2 DIABETES MELLITUS WITH HYPERGLYCEMIA, WITH LONG-TERM CURRENT USE OF INSULIN (HCC): Primary | ICD-10-CM

## 2023-05-09 ENCOUNTER — PROCEDURE VISIT (OUTPATIENT)
Dept: PODIATRY | Facility: CLINIC | Age: 58
End: 2023-05-09

## 2023-05-09 VITALS — HEIGHT: 70 IN | BODY MASS INDEX: 24.34 KG/M2 | WEIGHT: 170 LBS

## 2023-05-09 DIAGNOSIS — M20.42 HAMMER TOES OF BOTH FEET: ICD-10-CM

## 2023-05-09 DIAGNOSIS — E11.65 TYPE 2 DIABETES MELLITUS WITH HYPERGLYCEMIA, WITH LONG-TERM CURRENT USE OF INSULIN (HCC): Primary | ICD-10-CM

## 2023-05-09 DIAGNOSIS — M20.41 HAMMER TOES OF BOTH FEET: ICD-10-CM

## 2023-05-09 DIAGNOSIS — Z79.4 TYPE 2 DIABETES MELLITUS WITH HYPERGLYCEMIA, WITH LONG-TERM CURRENT USE OF INSULIN (HCC): Primary | ICD-10-CM

## 2023-05-09 DIAGNOSIS — M21.42 PES PLANUS OF BOTH FEET: ICD-10-CM

## 2023-05-09 DIAGNOSIS — M21.41 PES PLANUS OF BOTH FEET: ICD-10-CM

## 2023-05-14 DIAGNOSIS — Z79.4 CURRENT USE OF INSULIN (HCC): ICD-10-CM

## 2023-05-14 DIAGNOSIS — E11.65 TYPE 2 DIABETES MELLITUS WITH HYPERGLYCEMIA, WITH LONG-TERM CURRENT USE OF INSULIN (HCC): ICD-10-CM

## 2023-05-14 DIAGNOSIS — Z79.4 TYPE 2 DIABETES MELLITUS WITH HYPERGLYCEMIA, WITH LONG-TERM CURRENT USE OF INSULIN (HCC): ICD-10-CM

## 2023-05-15 RX ORDER — INSULIN ASPART 100 [IU]/ML
INJECTION, SUSPENSION SUBCUTANEOUS
Qty: 15 ML | Refills: 1 | Status: SHIPPED | OUTPATIENT
Start: 2023-05-15

## 2023-05-16 ENCOUNTER — ESTABLISHED COMPREHENSIVE EXAM (OUTPATIENT)
Dept: URBAN - METROPOLITAN AREA CLINIC 6 | Facility: CLINIC | Age: 58
End: 2023-05-16

## 2023-05-16 DIAGNOSIS — E11.9: ICD-10-CM

## 2023-05-16 DIAGNOSIS — H25.813: ICD-10-CM

## 2023-05-16 DIAGNOSIS — H43.813: ICD-10-CM

## 2023-05-16 DIAGNOSIS — H44.23: ICD-10-CM

## 2023-05-16 DIAGNOSIS — H40.1131: ICD-10-CM

## 2023-05-16 DIAGNOSIS — H04.123: ICD-10-CM

## 2023-05-16 DIAGNOSIS — H35.412: ICD-10-CM

## 2023-05-16 DIAGNOSIS — Z79.4: ICD-10-CM

## 2023-05-16 LAB
LEFT EYE DIABETIC RETINOPATHY: NORMAL
RIGHT EYE DIABETIC RETINOPATHY: NORMAL

## 2023-05-16 PROCEDURE — 92014 COMPRE OPH EXAM EST PT 1/>: CPT

## 2023-05-16 PROCEDURE — 92020 GONIOSCOPY: CPT

## 2023-05-16 PROCEDURE — 2023F DILAT RTA XM W/O RTNOPTHY: CPT | Performed by: INTERNAL MEDICINE

## 2023-05-16 ASSESSMENT — VISUAL ACUITY
OD_CC: 20/300
OS_CC: 20/40

## 2023-05-16 ASSESSMENT — TONOMETRY
OS_IOP_MMHG: 13
OD_IOP_MMHG: 11

## 2023-05-25 DIAGNOSIS — Z79.4 CURRENT USE OF INSULIN (HCC): ICD-10-CM

## 2023-05-25 DIAGNOSIS — E11.65 TYPE 2 DIABETES MELLITUS WITH HYPERGLYCEMIA, WITH LONG-TERM CURRENT USE OF INSULIN (HCC): ICD-10-CM

## 2023-05-25 DIAGNOSIS — Z79.4 TYPE 2 DIABETES MELLITUS WITH HYPERGLYCEMIA, WITH LONG-TERM CURRENT USE OF INSULIN (HCC): ICD-10-CM

## 2023-06-21 ENCOUNTER — TELEPHONE (OUTPATIENT)
Dept: PODIATRY | Facility: CLINIC | Age: 58
End: 2023-06-21

## 2023-06-21 NOTE — TELEPHONE ENCOUNTER
Caller: Divya Ye    Doctor:  Beka Rogers DPM    Reason for call: He needs an appointment to come in and  his orthotics      Call back#: [phone]

## 2023-06-23 ENCOUNTER — TELEPHONE (OUTPATIENT)
Dept: PODIATRY | Facility: CLINIC | Age: 58
End: 2023-06-23

## 2023-06-23 NOTE — TELEPHONE ENCOUNTER
Caller: Mortimer Solid    Doctor: Marycarmen Holman      Reason for call: Wants an appt to  orthotics - please call       Call back#: [phone]

## 2023-06-27 ENCOUNTER — OFFICE VISIT (OUTPATIENT)
Dept: PODIATRY | Facility: CLINIC | Age: 58
End: 2023-06-27
Payer: COMMERCIAL

## 2023-06-27 DIAGNOSIS — E11.65 TYPE 2 DIABETES MELLITUS WITH HYPERGLYCEMIA, WITH LONG-TERM CURRENT USE OF INSULIN (HCC): Primary | ICD-10-CM

## 2023-06-27 DIAGNOSIS — M20.41 HAMMER TOES OF BOTH FEET: ICD-10-CM

## 2023-06-27 DIAGNOSIS — Z79.4 TYPE 2 DIABETES MELLITUS WITH HYPERGLYCEMIA, WITH LONG-TERM CURRENT USE OF INSULIN (HCC): Primary | ICD-10-CM

## 2023-06-27 DIAGNOSIS — M21.42 PES PLANUS OF BOTH FEET: ICD-10-CM

## 2023-06-27 DIAGNOSIS — M20.42 HAMMER TOES OF BOTH FEET: ICD-10-CM

## 2023-06-27 DIAGNOSIS — M21.41 PES PLANUS OF BOTH FEET: ICD-10-CM

## 2023-06-27 PROCEDURE — L3000 FT INSERT UCB BERKELEY SHELL: HCPCS | Performed by: PODIATRIST

## 2023-06-30 ENCOUNTER — OFFICE VISIT (OUTPATIENT)
Dept: INTERNAL MEDICINE CLINIC | Facility: CLINIC | Age: 58
End: 2023-06-30
Payer: COMMERCIAL

## 2023-06-30 VITALS
HEART RATE: 85 BPM | TEMPERATURE: 96.4 F | DIASTOLIC BLOOD PRESSURE: 82 MMHG | WEIGHT: 170.6 LBS | BODY MASS INDEX: 24.48 KG/M2 | OXYGEN SATURATION: 97 % | SYSTOLIC BLOOD PRESSURE: 118 MMHG

## 2023-06-30 DIAGNOSIS — Z79.4 TYPE 2 DIABETES MELLITUS WITH HYPERGLYCEMIA, WITH LONG-TERM CURRENT USE OF INSULIN (HCC): ICD-10-CM

## 2023-06-30 DIAGNOSIS — Z00.00 WELLNESS EXAMINATION: Primary | ICD-10-CM

## 2023-06-30 DIAGNOSIS — E78.2 MIXED HYPERLIPIDEMIA: ICD-10-CM

## 2023-06-30 DIAGNOSIS — Z79.899 LONG TERM CURRENT USE OF IMMUNOSUPPRESSIVE DRUG: ICD-10-CM

## 2023-06-30 DIAGNOSIS — I10 ESSENTIAL HYPERTENSION: ICD-10-CM

## 2023-06-30 DIAGNOSIS — K21.9 GASTROESOPHAGEAL REFLUX DISEASE WITHOUT ESOPHAGITIS: ICD-10-CM

## 2023-06-30 DIAGNOSIS — D89.89 IGG4 RELATED DISEASE (HCC): ICD-10-CM

## 2023-06-30 DIAGNOSIS — Z86.010 HISTORY OF COLONIC POLYPS: ICD-10-CM

## 2023-06-30 DIAGNOSIS — E11.65 TYPE 2 DIABETES MELLITUS WITH HYPERGLYCEMIA, WITH LONG-TERM CURRENT USE OF INSULIN (HCC): ICD-10-CM

## 2023-06-30 NOTE — PROGRESS NOTES
Name: Tej Bae      : 1965      MRN: [id number]  Encounter Provider: Jack Lewis DO  Encounter Date: 2023   Encounter department: [place] [address]     1  Wellness examination    2  Type 2 diabetes mellitus with hyperglycemia, with long-term current use of insulin ([facility])  Comments:  doing better with jardiance and metformin, 70/30 insulin, c/w care per endo    3  Gastroesophageal reflux disease without esophagitis  Comments:  taking H2 blocker and stable, c/w rx    4  Essential hypertension  Comments:  BP doing well, c/w ACEI    5  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[address] )  Comments:  taking methotrexate and rituximab infusions and seeing Ronnie Blackman of [facility] for ongoing care    6  Mixed hyperlipidemia  Comments:  taking statin and no SE, c/w rx    7  Long term current use of immunosuppressive drug    8  History of colonic polyps  -     Ambulatory Referral to Gastroenterology; Future        Depression Screening and Follow-up Plan: Patient was screened for depression during today's encounter  They screened negative with a PHQ-2 score of 0  Subjective      HPI     Here for physical, Cecilia Ochoa is overall doing well  His blood sugars were running a bit higher but he started on jardiance and they are doing better now  He uses CGM and sees endo for DM care  He gets rituximab infusions every 6 mos and had this in May 2023 last   He is interested in a 2nd COVID bivalent booster dose and we discussed risk/benefits of this  He is due for repeat colonoscopy this year  ROS Otherwise negative, no other complaints  Review of Systems   Constitutional: Negative for fever  HENT: Negative for congestion  Eyes: Negative for visual disturbance  Respiratory: Negative for shortness of breath  Cardiovascular: Negative for chest pain  Gastrointestinal: Negative for abdominal pain  Endocrine: Negative for polyuria     Genitourinary: Negative for difficulty urinating  Musculoskeletal: Negative for arthralgias  Skin: Negative for rash  Allergic/Immunologic: Positive for immunocompromised state  Neurological: Negative for dizziness  Psychiatric/Behavioral: Negative for dysphoric mood  Current Outpatient Medications on File Prior to Visit   Medication Sig   • Ascorbic Acid (VITAMIN C) 1000 MG tablet Take 1,000 mg by mouth daily   • b complex vitamins tablet Take 1 tablet by mouth daily   • Continuous Blood Gluc Sensor (Dexcom G7 Sensor) Use 1 Device every 10 days   • Cyanocobalamin (VITAMIN B12 PO) Take 1 tablet by mouth daily   • Diclofenac Sodium (VOLTAREN) 1 % Apply 2 g topically 2 (two) times a day as needed (knee pain/OA)   • Droplet Pen Needles 31G X 8 MM MISC USE 1 PEN NEEDLE FOR INSULIN INJECTION SUBCUTANEOUSLY TWICE A DAY   • Empagliflozin (JARDIANCE) 10 MG TABS tablet Take one pill by mouth once daily   • famotidine (PEPCID) 40 MG tablet Take 1 tablet (40 mg total) by mouth 2 (two) times a day   • FLUTICASONE PROPIONATE, INHAL, IN Inhale 2 sprays 2 (two) times a day   • folic acid (FOLVITE) 1 mg tablet Take 1 mg by mouth daily   • glucose blood test strip onetouch     calvin ultra   • latanoprost (XALATAN) 0 005 % ophthalmic solution 1 drop daily at bedtime   • lisinopril (ZESTRIL) 20 mg tablet take 1 tablet by mouth once daily   • metFORMIN (GLUCOPHAGE) 1000 MG tablet take 1 tablet by mouth twice a day with meals   • methotrexate 2 5 mg tablet 15 mg once a week   • Multiple Vitamins-Minerals (PRESERVISION AREDS PO) Take 1 tablet by mouth daily   • multivitamin (THERAGRAN) TABS Take 1 tablet by mouth daily   • mupirocin (BACTROBAN) 2 % ointment DILUTE 1 INCH OF MUPIROCIN OINT IN 8 OZ  OF SALINE IRRIGATE EACH NOSTRIL WITH 4 OZ   TWICE A DAY   • NovoLOG Mix 70/30 FlexPen (70-30) 100 units/mL injection pen INJECT 7 UNITS UNDER THE SKIN JUST PRIOR TO BREAKFAST AND JUST PRIOR TO DINNER   • Omega-3 Fatty Acids (FISH OIL PO) Take 1 capsule by mouth daily • OneTouch Ultra test strip Check up to 3 times daily as needed before meals and at bedtime  • riTUXimab (RITUXAN) injection Infuse into a venous catheter    • rosuvastatin (CRESTOR) 10 MG tablet Take 1 tablet (10 mg total) by mouth daily   • Vitamin D, Cholecalciferol, 1000 units CAPS Take 1 tablet by mouth 2 (two) times a day        Objective     /82 (BP Location: Left arm, Patient Position: Sitting, Cuff Size: Standard)   Pulse 85   Temp (!) 96 4 °F (35 8 °C)   Wt 77 4 kg (170 lb 9 6 oz)   SpO2 97%   BMI 24 48 kg/m²     Physical Exam  Vitals reviewed  Constitutional:       General: He is not in acute distress  HENT:      Head: Normocephalic and atraumatic  Right Ear: Tympanic membrane normal       Left Ear: Tympanic membrane normal    Eyes:      Conjunctiva/sclera: Conjunctivae normal    Cardiovascular:      Rate and Rhythm: Normal rate and regular rhythm  Heart sounds: No murmur heard  Pulmonary:      Effort: Pulmonary effort is normal       Breath sounds: No wheezing or rales  Abdominal:      General: Bowel sounds are normal       Palpations: Abdomen is soft  Tenderness: There is no abdominal tenderness  Musculoskeletal:      Cervical back: Neck supple  Right lower leg: No edema  Left lower leg: No edema  Neurological:      Mental Status: He is alert  Mental status is at baseline     Psychiatric:         Mood and Affect: Mood normal          Behavior: Behavior normal        Jose Alvarenga DO

## 2023-07-20 DIAGNOSIS — Z79.4 TYPE 2 DIABETES MELLITUS WITH HYPERGLYCEMIA, WITH LONG-TERM CURRENT USE OF INSULIN (HCC): ICD-10-CM

## 2023-07-20 DIAGNOSIS — E11.65 TYPE 2 DIABETES MELLITUS WITH HYPERGLYCEMIA, WITH LONG-TERM CURRENT USE OF INSULIN (HCC): ICD-10-CM

## 2023-07-21 RX ORDER — PEN NEEDLE, DIABETIC 31 GX5/16"
NEEDLE, DISPOSABLE MISCELLANEOUS 4 TIMES DAILY
Qty: 400 EACH | Refills: 1 | Status: SHIPPED | OUTPATIENT
Start: 2023-07-21

## 2023-07-31 ENCOUNTER — TELEPHONE (OUTPATIENT)
Dept: ENDOCRINOLOGY | Facility: CLINIC | Age: 58
End: 2023-07-31

## 2023-07-31 DIAGNOSIS — Z79.4 TYPE 2 DIABETES MELLITUS WITH HYPERGLYCEMIA, WITH LONG-TERM CURRENT USE OF INSULIN (HCC): Primary | ICD-10-CM

## 2023-07-31 DIAGNOSIS — E11.65 TYPE 2 DIABETES MELLITUS WITH HYPERGLYCEMIA, WITH LONG-TERM CURRENT USE OF INSULIN (HCC): Primary | ICD-10-CM

## 2023-07-31 NOTE — TELEPHONE ENCOUNTER
Late entry: pt called he left message on voicemail if he needs to get labs done prior to his appt on 8/22/23? If so can you order?  Thank you

## 2023-08-03 ENCOUNTER — LAB (OUTPATIENT)
Dept: LAB | Facility: CLINIC | Age: 58
End: 2023-08-03
Payer: COMMERCIAL

## 2023-08-03 DIAGNOSIS — E11.65 TYPE 2 DIABETES MELLITUS WITH HYPERGLYCEMIA, WITH LONG-TERM CURRENT USE OF INSULIN (HCC): ICD-10-CM

## 2023-08-03 DIAGNOSIS — Z79.4 TYPE 2 DIABETES MELLITUS WITH HYPERGLYCEMIA, WITH LONG-TERM CURRENT USE OF INSULIN (HCC): ICD-10-CM

## 2023-08-03 LAB
ANION GAP SERPL CALCULATED.3IONS-SCNC: 11 MMOL/L
BUN SERPL-MCNC: 11 MG/DL (ref 5–25)
CALCIUM SERPL-MCNC: 9.3 MG/DL (ref 8.3–10.1)
CHLORIDE SERPL-SCNC: 96 MMOL/L (ref 96–108)
CO2 SERPL-SCNC: 24 MMOL/L (ref 21–32)
CREAT SERPL-MCNC: 1.1 MG/DL (ref 0.6–1.3)
EST. AVERAGE GLUCOSE BLD GHB EST-MCNC: 174 MG/DL
GFR SERPL CREATININE-BSD FRML MDRD: 73 ML/MIN/1.73SQ M
GLUCOSE P FAST SERPL-MCNC: 145 MG/DL (ref 65–99)
HBA1C MFR BLD: 7.7 %
POTASSIUM SERPL-SCNC: 4.4 MMOL/L (ref 3.5–5.3)
SODIUM SERPL-SCNC: 131 MMOL/L (ref 135–147)

## 2023-08-03 PROCEDURE — 83036 HEMOGLOBIN GLYCOSYLATED A1C: CPT

## 2023-08-03 PROCEDURE — 80048 BASIC METABOLIC PNL TOTAL CA: CPT

## 2023-08-21 DIAGNOSIS — E78.2 MIXED HYPERLIPIDEMIA: ICD-10-CM

## 2023-08-21 RX ORDER — ROSUVASTATIN CALCIUM 10 MG/1
10 TABLET, COATED ORAL DAILY
Qty: 90 TABLET | Refills: 1 | Status: SHIPPED | OUTPATIENT
Start: 2023-08-21

## 2023-08-22 ENCOUNTER — OFFICE VISIT (OUTPATIENT)
Dept: ENDOCRINOLOGY | Facility: CLINIC | Age: 58
End: 2023-08-22
Payer: COMMERCIAL

## 2023-08-22 VITALS
BODY MASS INDEX: 24.79 KG/M2 | SYSTOLIC BLOOD PRESSURE: 158 MMHG | HEIGHT: 70 IN | HEART RATE: 78 BPM | WEIGHT: 173.2 LBS | DIASTOLIC BLOOD PRESSURE: 102 MMHG

## 2023-08-22 DIAGNOSIS — Z79.4 CURRENT USE OF INSULIN (HCC): Primary | ICD-10-CM

## 2023-08-22 DIAGNOSIS — E11.65 TYPE 2 DIABETES MELLITUS WITH HYPERGLYCEMIA, WITH LONG-TERM CURRENT USE OF INSULIN (HCC): ICD-10-CM

## 2023-08-22 DIAGNOSIS — Z79.4 TYPE 2 DIABETES MELLITUS WITH HYPERGLYCEMIA, WITH LONG-TERM CURRENT USE OF INSULIN (HCC): ICD-10-CM

## 2023-08-22 PROCEDURE — 99214 OFFICE O/P EST MOD 30 MIN: CPT | Performed by: INTERNAL MEDICINE

## 2023-08-22 PROCEDURE — 95251 CONT GLUC MNTR ANALYSIS I&R: CPT | Performed by: INTERNAL MEDICINE

## 2023-08-22 NOTE — PROGRESS NOTES
Established Patient Progress Note      Chief Complaint   Patient presents with   • Diabetes Type 2      Referring Provider  No referring provider defined for this encounter. History of Present Illness:   Eliecer Conklin is a 59 y.o. male with a history of type 2 diabetes, last seen in 4/2023    .      Diabetes hx:   He was dx with T2DM in 2002, but started insulin in 2016. He has been seen by GI for idiopathic pancreatitis. He recalls one discrete episode of pancreatitis, but also several times when he had back pain.       Of note, he had COVID in Oct 2022. Bgs were elevated and with steroid use    He was taking Janumet and glimepiride initially, but was changed to Novolog 70/30 after the episode of pancreatitis.   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 He follows closely with Rheum and Ophthal (Samuel). For maintenance, he gets Rituximab and methotrexate. He is getting Rituximab every 6mos. Bgs were elevated with steroids. He recently has been getting some symptoms similar to his prior orbital symptoms and Rheum (Dr. Edmund Scales) has ordered an MRI.      He works as an , but tried to follow a very good effort  Current regimen: Novolog 70/30 7units bf and 7units dinner, metformin 1g twice daily and Jardiance 10mg once daily. Exercise is walking 1.5-2miles daily. Eyes: May 2023, Dr. Hood Harvey monitoring for glaucoma; also seen [email or web address]  Pod: self care also seen in Aug 2022 (Dr. Vitaly Joy) has orthotics      He does take lisinopril and simvastatin without issues. Still receiving Dexcom G6 from his DME supplier. [address] [place] [address] used   Home use Dexcom G6    Indication   Type 2 Diabetes    More than 72 hours of data was reviewed. Report to be scanned to chart.      Date Range:   8/9/2023-8/22/2023    Analysis of data:   Average Glucose: 137  Coefficient of Variation: n/a  SD : 28mg/dL  Time in Target Range: 91%  Time Above Range: 8% <1% v high  Time Below Range: 0     Interpretation of data:   Improved with less variation as compared with tracing from 4/2023, but A1c is not fully correlating with CGM. Patient Active Problem List   Diagnosis   • Gastroesophageal reflux disease without esophagitis   • 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[address])   • Idiopathic acute pancreatitis without infection or necrosis   • Mixed hyperlipidemia   • Type 2 diabetes mellitus with hyperglycemia, with long-term current use of insulin (HCC)   • Essential hypertension   • Current use of insulin ([facility])   • DARYL (obstructive sleep apnea)   • Long term current use of immunosuppressive drug      Past Medical History:   Diagnosis Date   • 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[address])    • Diabetes mellitus ([address])     blood sugar 121 @ 0600   • GERD (gastroesophageal reflux disease)    • 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[address])    • Sarcoid       Past Surgical History:   Procedure Laterality Date   • COLONOSCOPY     • ESOPHAGOGASTRODUODENOSCOPY N/A 4/2/2019    Procedure: ESOPHAGOGASTRODUODENOSCOPY (EGD); Surgeon: Gabby Angulo MD;  Location: [facility] GI LAB; Service: Gastroenterology   • LYMPH NODE BIOPSY     • LYMPH NODE DISSECTION  2009, left axilla    benign on pathology   • OPTIC NERVE DECOMPRESSION     • [place] ESOPHAGOGASTRODUODENOSCOPY TRANSORAL DIAGNOSTIC N/A 5/26/2017    Procedure: EGD AND COLONOSCOPY;  Surgeon: Gabby Angulo MD;  Location:  GI LAB;   Service: Gastroenterology   • SINUS SURGERY     • SINUS SURGERY     • WISDOM TOOTH EXTRACTION        Family History   Problem Relation Age of Onset   • Alzheimer's disease Mother    • Hypertension Mother    • Osteoporosis Mother    • Hypertension Father    • Heart attack Father    • Osteoporosis Sister    • Diabetes Brother    • Diabetes type II Brother    • Hyperthyroidism Brother    • Diabetes type II Paternal Aunt      Social History     Tobacco Use   • Smoking status: Former     Packs/day: 1.00     Years: 25.00     Total pack years: 25.00 Types: Cigarettes     Quit date: 2016     Years since quittin.3   • Smokeless tobacco: Never   Substance Use Topics   • Alcohol use: Not Currently     Comment: rarely     Allergies   Allergen Reactions   • Cefaclor Hives and Rash     Tolerate amoxicillin           Current Outpatient Medications:   •  Ascorbic Acid (VITAMIN C) 1000 MG tablet, Take 1,000 mg by mouth daily, Disp: , Rfl:   •  b complex vitamins tablet, Take 1 tablet by mouth daily, Disp: , Rfl:   •  Continuous Blood Gluc Sensor (Dexcom G7 Sensor), Use 1 Device every 10 days, Disp: 9 each, Rfl: 1  •  Cyanocobalamin (VITAMIN B12 PO), Take 1 tablet by mouth daily, Disp: , Rfl:   •  Diclofenac Sodium (VOLTAREN) 1 %, Apply 2 g topically 2 (two) times a day as needed (knee pain/OA), Disp: 1 Tube, Rfl: 0  •  Droplet Pen Needles 31G X 8 MM MISC, Inject under the skin 4 (four) times a day, Disp: 400 each, Rfl: 1  •  Empagliflozin (JARDIANCE) 10 MG TABS tablet, Take one pill by mouth once daily, Disp: 30 tablet, Rfl: 4  •  famotidine (PEPCID) 40 MG tablet, Take 1 tablet (40 mg total) by mouth 2 (two) times a day (Patient taking differently: Take 20 mg by mouth 2 (two) times a day), Disp: 180 tablet, Rfl: 3  •  FLUTICASONE PROPIONATE, INHAL, IN, Inhale 2 sprays 2 (two) times a day, Disp: , Rfl:   •  folic acid (FOLVITE) 1 mg tablet, Take 1 mg by mouth daily, Disp: , Rfl:   •  glucose blood test strip, onetouch     calvin ultra, Disp: , Rfl:   •  latanoprost (XALATAN) 0.005 % ophthalmic solution, 1 drop daily at bedtime, Disp: , Rfl:   •  lisinopril (ZESTRIL) 20 mg tablet, take 1 tablet by mouth once daily, Disp: 90 tablet, Rfl: 2  •  metFORMIN (GLUCOPHAGE) 1000 MG tablet, take 1 tablet by mouth twice a day with meals, Disp: 60 tablet, Rfl: 5  •  methotrexate 2.5 mg tablet, 15 mg once a week, Disp: , Rfl:   •  Multiple Vitamins-Minerals (PRESERVISION AREDS PO), Take 1 tablet by mouth daily, Disp: , Rfl:   •  multivitamin (THERAGRAN) TABS, Take 1 tablet by mouth daily, Disp: , Rfl:   •  mupirocin (BACTROBAN) 2 % ointment, DILUTE 1 INCH OF MUPIROCIN OINT IN 8 OZ. OF SALINE IRRIGATE EACH NOSTRIL WITH 4 OZ. TWICE A DAY, Disp: , Rfl:   •  NovoLOG Mix 70/30 FlexPen (70-30) 100 units/mL injection pen, INJECT 7 UNITS UNDER THE SKIN JUST PRIOR TO BREAKFAST AND JUST PRIOR TO DINNER, Disp: 15 mL, Rfl: 1  •  Omega-3 Fatty Acids (FISH OIL PO), Take 1 capsule by mouth daily, Disp: , Rfl:   •  OneTouch Ultra test strip, Check up to 3 times daily as needed before meals and at bedtime. , Disp: 300 strip, Rfl: 2  •  riTUXimab (RITUXAN) injection, Infuse into a venous catheter , Disp: , Rfl:   •  rosuvastatin (CRESTOR) 10 MG tablet, Take 1 tablet (10 mg total) by mouth daily, Disp: 90 tablet, Rfl: 1  •  Vitamin D, Cholecalciferol, 1000 units CAPS, Take 1 tablet by mouth in the morning, Disp: , Rfl:   Review of Systems   Constitutional: Negative for unexpected weight change. HENT: Negative for voice change. Eyes: Negative for visual disturbance. Respiratory: Negative for cough and shortness of breath. Cardiovascular: Negative for chest pain and palpitations. Gastrointestinal: Negative for constipation and diarrhea. Endocrine: Negative for polydipsia and polyuria. Musculoskeletal: Negative for gait problem. Neurological: Negative for tremors. Psychiatric/Behavioral: The patient is not nervous/anxious. Physical Exam:  Body mass index is 24.85 kg/m². BP (!) 158/102   Pulse 78   Ht 5' 10" (1.778 m)   Wt 78.6 kg (173 lb 3.2 oz)   BMI 24.85 kg/m²    Wt Readings from Last 3 Encounters:   08/22/23 78.6 kg (173 lb 3.2 oz)   06/30/23 77.4 kg (170 lb 9.6 oz)   05/09/23 77.1 kg (170 lb)            Physical Exam   Gen: appears well-developed and well-nourished. No apparent distress. Head: Normocephalic and atraumatic. Eyes: no stare or proptosis, no periorbital edema  E/N/M nl facies place, hearing grossly intact  Neck: range of motion nl.    Pulmonary/Chest: breathing  comfortably, no accessory muscle use, effort normal.   Musculoskeletal: moves all extremities, gait nl  Neurological: alert and oriented to person, place, and time. No upper ext tremor appreciated  Skin: does not appear diaphoretic, no facial plethora  Psychiatric: normal mood and affect; behavior is normal; no gross lapses in memory, answer questions appropriately            Labs:     Lab Results   Component Value Date    HGBA1C 7.7 (H) 08/03/2023         Lab Results   Component Value Date    CREATININE 1.10 08/03/2023    CREATININE 1.00 03/24/2023    CREATININE 0.94 11/11/2022    BUN 11 08/03/2023    K 4.4 08/03/2023    CL 96 08/03/2023    CO2 24 08/03/2023     eGFR   Date Value Ref Range Status   08/03/2023 73 ml/min/1.73sq m Final         Lab Results   Component Value Date    HDL 59 07/18/2022    TRIG 108 07/18/2022       Lab Results   Component Value Date    ALT 31 03/24/2023    AST 14 03/24/2023    ALKPHOS 80 03/24/2023           Impression:  1. Current use of insulin ([address])    2. Type 2 diabetes mellitus with hyperglycemia, with long-term current use of insulin ([address])           Plan:    Alireza Larson was seen today for diabetes type 2. Diagnoses and all orders for this visit:    Current use of insulin ([address])  -     Hemoglobin A1C; Future  -     Fructosamine- Lab Collect; Future  -     Basic metabolic panel Lab Collect; Future    Type 2 diabetes mellitus with hyperglycemia, with long-term current use of insulin ([facility])  -     Hemoglobin A1C; Future  -     Fructosamine- Lab Collect; Future  -     Basic metabolic panel Lab Collect; Future         1. 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 He uses his Dexcom well, though his CGM tracing is well below the A1c.  Will compare the fructosamine to next A1c, but he has been tested for hemoglobinopathy in the past.     Discussed with the patient and all questioned fully answered. He will call me if any problems arise.     Counseled patient on diagnostic results, prognosis, risk and benefit of treatment options, instruction for management, importance of treatment compliance, Risk  factor reduction and impressions      Raimundo Nelson MD

## 2023-09-11 ENCOUNTER — OFFICE VISIT (OUTPATIENT)
Dept: SLEEP CENTER | Facility: CLINIC | Age: 58
End: 2023-09-11
Payer: COMMERCIAL

## 2023-09-11 VITALS
HEIGHT: 70 IN | OXYGEN SATURATION: 96 % | SYSTOLIC BLOOD PRESSURE: 122 MMHG | BODY MASS INDEX: 24.2 KG/M2 | HEART RATE: 98 BPM | WEIGHT: 169 LBS | DIASTOLIC BLOOD PRESSURE: 80 MMHG

## 2023-09-11 DIAGNOSIS — G47.33 OSA (OBSTRUCTIVE SLEEP APNEA): Primary | ICD-10-CM

## 2023-09-11 PROCEDURE — 99213 OFFICE O/P EST LOW 20 MIN: CPT | Performed by: INTERNAL MEDICINE

## 2023-09-11 NOTE — PROGRESS NOTES
Sleep Medicine Outpatient Follow Up Note   Ashleigh Pate 59 y.o. male MRN: [id number]  9/11/2023      Reason for Consultation:    Chief Complaint   Patient presents with   • Follow-up         Assessment/Plan:    1. DARYL (obstructive sleep apnea)  Assessment & Plan:  · Hx DARYL on CPAP with full facemask Keesha G3  · Compliant with CPAP daily  · AHI 0.4, mean pressure 6, 95% 7.1  · Average use 7 hrs daily  · No snoring, daytime somnolence  · Has had issues with air leaking into tear ducts due to high pressures. Hx autoimmune issues with his eyes and tear ducts. · Also endorses condensation in hose, more prominent during winter    Plan:  · Advised patient to let us know if he has any further worsening of symptoms of eye pain/irritation or tear duct pressures  · Patient overall doing well, continue CPAP  · Will try to get him a heated hose  · F/u in 1 year    Orders:  -     PAP DME Resupply/Reorder        Health Maintenance  Immunization History   Administered Date(s) Administered   • COVID-19 MODERNA VACC 0.5 ML IM 01/29/2021, 02/28/2021, 08/20/2021, 02/08/2022, 07/29/2022   • COVID-19 Moderna Vac BIVALENT 12 Yr+ IM 0.5 ML 01/22/2023   • H1N1, All Formulations 11/22/2009   • INFLUENZA 11/21/2013, 10/17/2016, 11/14/2017, 09/24/2018, 10/14/2019, 10/23/2020, 11/01/2022   • Influenza Injectable, MDCK, Preservative Free, Quadrivalent, 0.5 mL 10/17/2021   • Influenza Quadrivalent Preservative Free 3 years and older IM 10/17/2016, 11/14/2017, 10/14/2019   • Influenza, seasonal, injectable, preservative free 10/06/2014   • Pneumococcal Polysaccharide PPV23 05/04/2006   • Rabies 06/01/2010, 06/04/2010   • Tdap 06/01/2010, 10/30/2014   • influenza, injectable, quadrivalent 10/23/2020        Return in about 1 year (around 9/11/2024).     History of Present Illness   HPI:  Ashleigh Pate is a 59 y.o. male who has a past medical history of type 2 diabetes, idiopathic pancreatitis, IgG4 mediated orbital pseudotumor, hypertension, hyperlipidemia, moderate obstructive sleep apnea    HST GRAHAM 20.1    Mask type: full facemask F20  Skin irritation from the mask: None  Pain or discomfort: None  Feeling of claustrophobia: None  Aerophagia: None  Pressure intolerance: Once in a while too much pressure and he has trouble adjusting the pressure. So he turns it off or puts it on standby, reuses it and is able to go back to sleep okay  Nasal congestion or rhinorrhea: Chronic sinus issues so yes  Nasal and oral dryness: once in a while  Morning headache: None  Acid reflux symptoms: Yes, Pepcid helps with symptoms  Snoring with PAP: None  Parasomnias: None  Naps: None    Using PAP every night/day: Yes  Using PAP the entire duration of sleep: Yes  Benefiting from PAP: Yes    [place]: 5    Historical Information   Past Medical History:   Diagnosis Date   • 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[facility])    • Diabetes mellitus ([address])     blood sugar 121 @ 0600   • GERD (gastroesophageal reflux disease)    • 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[facility])    • Sarcoid      Past Surgical History:   Procedure Laterality Date   • COLONOSCOPY     • ESOPHAGOGASTRODUODENOSCOPY N/A 4/2/2019    Procedure: ESOPHAGOGASTRODUODENOSCOPY (EGD); Surgeon: Violetta Duncan MD;  Location: [facility]; Service: Gastroenterology   • LYMPH NODE BIOPSY     • LYMPH NODE DISSECTION  2009, left axilla    benign on pathology   • OPTIC NERVE DECOMPRESSION     • KS ESOPHAGOGASTRODUODENOSCOPY TRANSORAL DIAGNOSTIC N/A 5/26/2017    Procedure: EGD AND COLONOSCOPY;  Surgeon: Violetta Duncan MD;  Location:  GI LAB;   Service: Gastroenterology   • SINUS SURGERY     • SINUS SURGERY     • WISDOM TOOTH EXTRACTION       Family History   Problem Relation Age of Onset   • Alzheimer's disease Mother    • Hypertension Mother    • Osteoporosis Mother    • Hypertension Father    • Heart attack Father    • Osteoporosis Sister    • Diabetes Brother    • Diabetes type II Brother    • Hyperthyroidism Brother    • Diabetes type II Paternal Aunt Meds/Allergies     Current Outpatient Medications:   •  Ascorbic Acid (VITAMIN C) 1000 MG tablet, Take 1,000 mg by mouth daily, Disp: , Rfl:   •  b complex vitamins tablet, Take 1 tablet by mouth daily, Disp: , Rfl:   •  Continuous Blood Gluc Sensor (Dexcom G7 Sensor), Use 1 Device every 10 days, Disp: 9 each, Rfl: 1  •  Cyanocobalamin (VITAMIN B12 PO), Take 1 tablet by mouth daily, Disp: , Rfl:   •  Diclofenac Sodium (VOLTAREN) 1 %, Apply 2 g topically 2 (two) times a day as needed (knee pain/OA), Disp: 1 Tube, Rfl: 0  •  Droplet Pen Needles 31G X 8 MM MISC, Inject under the skin 4 (four) times a day, Disp: 400 each, Rfl: 1  •  Empagliflozin (JARDIANCE) 10 MG TABS tablet, Take one pill by mouth once daily, Disp: 30 tablet, Rfl: 4  •  famotidine (PEPCID) 40 MG tablet, Take 1 tablet (40 mg total) by mouth 2 (two) times a day (Patient taking differently: Take 20 mg by mouth 2 (two) times a day), Disp: 180 tablet, Rfl: 3  •  FLUTICASONE PROPIONATE, INHAL, IN, Inhale 2 sprays 2 (two) times a day, Disp: , Rfl:   •  folic acid (FOLVITE) 1 mg tablet, Take 1 mg by mouth daily, Disp: , Rfl:   •  glucose blood test strip, onetouch     calvin ultra, Disp: , Rfl:   •  latanoprost (XALATAN) 0.005 % ophthalmic solution, 1 drop daily at bedtime, Disp: , Rfl:   •  lisinopril (ZESTRIL) 20 mg tablet, take 1 tablet by mouth once daily, Disp: 90 tablet, Rfl: 2  •  metFORMIN (GLUCOPHAGE) 1000 MG tablet, take 1 tablet by mouth twice a day with meals, Disp: 60 tablet, Rfl: 5  •  methotrexate 2.5 mg tablet, 15 mg once a week, Disp: , Rfl:   •  Multiple Vitamins-Minerals (PRESERVISION AREDS PO), Take 1 tablet by mouth daily, Disp: , Rfl:   •  multivitamin (THERAGRAN) TABS, Take 1 tablet by mouth daily, Disp: , Rfl:   •  mupirocin (BACTROBAN) 2 % ointment, DILUTE 1 INCH OF MUPIROCIN OINT IN 8 OZ. OF SALINE IRRIGATE EACH NOSTRIL WITH 4 OZ.  TWICE A DAY, Disp: , Rfl:   •  NovoLOG Mix 70/30 FlexPen (70-30) 100 units/mL injection pen, INJECT 7 UNITS UNDER THE SKIN JUST PRIOR TO BREAKFAST AND JUST PRIOR TO DINNER, Disp: 15 mL, Rfl: 1  •  Omega-3 Fatty Acids (FISH OIL PO), Take 1 capsule by mouth daily, Disp: , Rfl:   •  OneTouch Ultra test strip, Check up to 3 times daily as needed before meals and at bedtime. , Disp: 300 strip, Rfl: 2  •  riTUXimab (RITUXAN) injection, Infuse into a venous catheter , Disp: , Rfl:   •  rosuvastatin (CRESTOR) 10 MG tablet, Take 1 tablet (10 mg total) by mouth daily, Disp: 90 tablet, Rfl: 1  •  Vitamin D, Cholecalciferol, 1000 units CAPS, Take 1 tablet by mouth in the morning, Disp: , Rfl:   Allergies   Allergen Reactions   • Cefaclor Hives and Rash     Tolerate amoxicillin         Vitals: Blood pressure 122/80, pulse 98, height 5' 10" (1.778 m), weight 76.7 kg (169 lb), SpO2 96 %. Body mass index is 24.25 kg/m². Oxygen Therapy  SpO2: 96 %    Physical Exam  Constitutional:       General: He is not in acute distress. Appearance: Normal appearance. He is not ill-appearing. HENT:      Head: Normocephalic and atraumatic. Mouth/Throat:      Mouth: Mucous membranes are moist.   Eyes:      General: No scleral icterus. Extraocular Movements: Extraocular movements intact. Pupils: Pupils are equal, round, and reactive to light. Cardiovascular:      Rate and Rhythm: Normal rate and regular rhythm. Pulses: Normal pulses. Heart sounds: Normal heart sounds. No murmur heard. No friction rub. No gallop. Pulmonary:      Effort: Pulmonary effort is normal. No respiratory distress. Breath sounds: Normal breath sounds. No wheezing or rales. Abdominal:      General: There is no distension. Palpations: Abdomen is soft. Tenderness: There is no abdominal tenderness. There is no guarding. Musculoskeletal:      Right lower leg: No edema. Left lower leg: No edema. Skin:     General: Skin is warm. Capillary Refill: Capillary refill takes less than 2 seconds.    Neurological: Mental Status: He is alert and oriented to person, place, and time. Mental status is at baseline. Psychiatric:         Mood and Affect: Mood normal.         Behavior: Behavior normal.             Labs: I have personally reviewed pertinent lab results. ABG: No results found for: "PHART", "JXJ2JCR", "PO2ART", "UYN7DYL", "F6HPCHKP", "BEART", "SOURCE",   BNP: No results found for: "BNP",   CBC:  Lab Results   Component Value Date    WBC 5.73 03/24/2022    HGB 14.7 03/24/2022    HCT 42.2 03/24/2022    MCV 91 03/24/2022     03/24/2022    EOSPCT 3 10/11/2020    EOSABS 0.21 10/11/2020    NEUTOPHILPCT 71 10/11/2020    LYMPHOPCT 12 (L) 10/11/2020   ,   CMP:   Lab Results   Component Value Date    SODIUM 131 (L) 08/03/2023    K 4.4 08/03/2023    CL 96 08/03/2023    CO2 24 08/03/2023    BUN 11 08/03/2023    CREATININE 1.10 08/03/2023    CALCIUM 9.3 08/03/2023    AST 14 03/24/2023    ALT 31 03/24/2023    ALKPHOS 80 03/24/2023    EGFR 73 08/03/2023   ,   PT/INR: No results found for: "PT", "INR",   Ferrtin: No components found for: "FERRTIN",  Magensium: No results found for: "MAGNESIUM",      Imaging and other studies: I have personally reviewed pertinent reports. and I have personally reviewed pertinent films in PACS      Sleep Study:  1/10/22 HST  TESTING RESULTS:   The test results are from 1 Night.  The total time in bed (analysis time)   was 405 minutes.       The patient had a total of 134 respiratory events made up of 40   obstructive apneas, 0 central apneas, 0 mixed apneas and 94 hypopneas   resulting in a respiratory event index (GRAHAM) of 20. 1.       The lowest SpO2 recorded is 84% and 2% of the study was spent with   saturations below 90%. The snore index was 31.7%.      Additional comments by patient:”  I feel like and never fell deeply asleep   after I wake each time to use the bathroom.”       Compliance data:  9/11/23  Keesha G3  6-10cm H2O  97% use  Average use 6 hours 59 minutes  AHI 0.4   Mean pressure 6, 95% 7.1      Deepak Atkins MD  Pulmonary, Critical Care and Sleep Medicine  [facility]     Portions of the record may have been created with voice recognition software. Occasional wrong word or "sound a like" substitutions may have occurred due to the inherent limitations of voice recognition software. Please read the chart carefully and recognize, using context, where substitutions have occurred.

## 2023-09-11 NOTE — ASSESSMENT & PLAN NOTE
· Hx moderate DARYL on CPAP with full facemask   · Using Keesha G3  · Compliant with CPAP daily  · Residual AHI 0.4, mean pressure 6, 95% 7.1  · Average use 7 hrs daily  · No snoring, daytime somnolence  · Has had issues with air leaking into tear ducts due to high pressures. Hx autoimmune issues with his eyes and tear ducts.   · Also endorses condensation in hose, more prominent during winter    Plan:  · Advised patient to let us know if he has any further worsening of symptoms of eye pain/irritation or lacrimal duct air leaks  · Wanted heated tubing - will check with [employer] if possible on Keesha G3  · Resupply Rx written  · F/u in 1 year

## 2023-09-11 NOTE — PATIENT INSTRUCTIONS
CPAP MAINTENANCE AND MANAGEMENT    1. Continue use of CPAP equipment nightly - use any time you are laying down to rest, watch TV, etc to increase use and in case you fall asleep to prevent falling asleep without it  2. If air is too hot, turn down the tubing heating setting  3. If excessive dry mouth in the morning, turn up humidifier setting and be sure to only use distilled water in your humidifier. You can also turn down the tubing heating setting  4. Change your filter regularly and wash the non-disposable filter  5. Continue to clean your equipment, as discussed, using mild soap and water. You can use unscented baby wipe on the mask. 6.  Contact the [facility] with any questions or concerns prior to your next visit, as needed  7. Schedule visit for follow-up in 1 year. You should see your sleep physician at least once a year. HOW TO CLEAN YOUR AUTO CPAP MACHINE    Mask: Clean the cushion of your mask daily. Dish soap and warm water. (Usually eligible for mask cushions every 3 months)  2. Headgear: Once a week. I find when you wash it daily it eats the material of the Velcro faster.  (Usually eligible for new headgear every 6 months)  3. Heated Tubing: Clean the tube every 3-7 days. One drop of dish soap run warm water through the tube then hang it over your shower curtain and let it drip dry. (Usually eligible every 3 months)  4. Humidifier: Rinse out every 1-3 days. Dish soap warm water or , top shelf. (eligible every 6 months) **DISTILLED WATER ONLY**  5. Filters:  Dark blue (reusable for 6 months)- Rinse under warm water (no soap) sit and drip dry every 2-3 weeks. (eligible for a new one every 6 months)  Light Blue (disposable) throw away every 2-4 weeks depending on how dirty it looks. (eligible for 6 every 3 months)    Check with your insurance and durable medical equipment company regarding supply replacements.      Nursing Support:  When: Monday through Friday 7A-5PM except holidays  Where: Our direct line is [phone]. If you are having a true emergency please call 911. In the event that the line is busy or it is after hours please leave a voice message and we will return your call. Please speak clearly, leaving your full name, birth date, best number to reach you and the reason for your call. Medication refills: We will need the name of the medication, the dosage, the ordering provider, whether you get a 30 or 90 day refill, and the pharmacy name and address. Medications will be ordered by the provider only. Nurses cannot call in prescriptions. Please allow 7 days for medication refills. Physician requested updates: If your provider requested that you call with an update after starting medication, please be ready to provide us the medication and dosage, what time you take your medication, the time you attempt to fall asleep, time you fall asleep, when you wake up, and what time you get out of bed. Sleep Study Results: We will contact you with sleep study results and/or next steps after the physician has reviewed your testing. Usually one of our nurses will call to talk about the results within 1-2 weeks of testing.

## 2023-09-12 ENCOUNTER — TELEPHONE (OUTPATIENT)
Dept: SLEEP CENTER | Facility: CLINIC | Age: 58
End: 2023-09-12

## 2023-09-13 LAB
DME PARACHUTE DELIVERY DATE ACTUAL: NORMAL
DME PARACHUTE DELIVERY DATE REQUESTED: NORMAL
DME PARACHUTE ITEM DESCRIPTION: NORMAL
DME PARACHUTE ORDER STATUS: NORMAL
DME PARACHUTE SUPPLIER NAME: NORMAL
DME PARACHUTE SUPPLIER PHONE: NORMAL

## 2023-09-14 DIAGNOSIS — E11.65 TYPE 2 DIABETES MELLITUS WITH HYPERGLYCEMIA, WITH LONG-TERM CURRENT USE OF INSULIN (HCC): ICD-10-CM

## 2023-09-14 DIAGNOSIS — Z79.4 TYPE 2 DIABETES MELLITUS WITH HYPERGLYCEMIA, WITH LONG-TERM CURRENT USE OF INSULIN (HCC): ICD-10-CM

## 2023-09-15 DIAGNOSIS — I10 ESSENTIAL HYPERTENSION: ICD-10-CM

## 2023-09-15 RX ORDER — LISINOPRIL 20 MG/1
20 TABLET ORAL DAILY
Qty: 90 TABLET | Refills: 2 | Status: SHIPPED | OUTPATIENT
Start: 2023-09-15

## 2023-10-09 DIAGNOSIS — Z79.4 TYPE 2 DIABETES MELLITUS WITH HYPERGLYCEMIA, WITH LONG-TERM CURRENT USE OF INSULIN (HCC): ICD-10-CM

## 2023-10-09 DIAGNOSIS — Z79.4 CURRENT USE OF INSULIN (HCC): Primary | ICD-10-CM

## 2023-10-09 DIAGNOSIS — Z79.4 CURRENT USE OF INSULIN (HCC): ICD-10-CM

## 2023-10-09 DIAGNOSIS — E11.65 TYPE 2 DIABETES MELLITUS WITH HYPERGLYCEMIA, WITH LONG-TERM CURRENT USE OF INSULIN (HCC): ICD-10-CM

## 2023-10-09 RX ORDER — ACYCLOVIR 400 MG/1
1 TABLET ORAL
Qty: 1 EACH | Refills: 0 | Status: SHIPPED | OUTPATIENT
Start: 2023-10-09

## 2023-10-09 RX ORDER — PROCHLORPERAZINE 25 MG/1
SUPPOSITORY RECTAL
Qty: 1 EACH | Refills: 0 | Status: SHIPPED | OUTPATIENT
Start: 2023-10-09

## 2023-10-17 ENCOUNTER — IOP CHECK (OUTPATIENT)
Dept: URBAN - METROPOLITAN AREA CLINIC 6 | Facility: CLINIC | Age: 58
End: 2023-10-17

## 2023-10-17 DIAGNOSIS — E11.9: ICD-10-CM

## 2023-10-17 DIAGNOSIS — H35.412: ICD-10-CM

## 2023-10-17 DIAGNOSIS — H44.23: ICD-10-CM

## 2023-10-17 DIAGNOSIS — H40.1131: ICD-10-CM

## 2023-10-17 DIAGNOSIS — H25.813: ICD-10-CM

## 2023-10-17 DIAGNOSIS — H04.123: ICD-10-CM

## 2023-10-17 DIAGNOSIS — Z79.4: ICD-10-CM

## 2023-10-17 DIAGNOSIS — H43.813: ICD-10-CM

## 2023-10-17 PROCEDURE — 92083 EXTENDED VISUAL FIELD XM: CPT

## 2023-10-17 PROCEDURE — 92012 INTRM OPH EXAM EST PATIENT: CPT

## 2023-10-17 ASSESSMENT — TONOMETRY
OS_IOP_MMHG: 10
OD_IOP_MMHG: 8
OD_IOP_MMHG: 10
OS_IOP_MMHG: 10

## 2023-10-17 ASSESSMENT — VISUAL ACUITY
OS_CC: 20/30
OD_CC: 20/400

## 2023-10-24 DIAGNOSIS — E11.65 TYPE 2 DIABETES MELLITUS WITH HYPERGLYCEMIA, WITH LONG-TERM CURRENT USE OF INSULIN (HCC): ICD-10-CM

## 2023-10-24 DIAGNOSIS — Z79.4 TYPE 2 DIABETES MELLITUS WITH HYPERGLYCEMIA, WITH LONG-TERM CURRENT USE OF INSULIN (HCC): ICD-10-CM

## 2023-10-25 RX ORDER — PEN NEEDLE, DIABETIC 31 GX5/16"
NEEDLE, DISPOSABLE MISCELLANEOUS 4 TIMES DAILY
Qty: 400 EACH | Refills: 1 | Status: SHIPPED | OUTPATIENT
Start: 2023-10-25

## 2023-10-30 DIAGNOSIS — E11.65 TYPE 2 DIABETES MELLITUS WITH HYPERGLYCEMIA, WITH LONG-TERM CURRENT USE OF INSULIN (HCC): Primary | ICD-10-CM

## 2023-10-30 DIAGNOSIS — Z79.4 TYPE 2 DIABETES MELLITUS WITH HYPERGLYCEMIA, WITH LONG-TERM CURRENT USE OF INSULIN (HCC): Primary | ICD-10-CM

## 2023-10-30 RX ORDER — PEN NEEDLE, DIABETIC 31 GX5/16"
NEEDLE, DISPOSABLE MISCELLANEOUS 2 TIMES DAILY
Qty: 200 EACH | Refills: 1 | Status: SHIPPED | OUTPATIENT
Start: 2023-10-30

## 2023-11-06 ENCOUNTER — APPOINTMENT (OUTPATIENT)
Dept: LAB | Facility: CLINIC | Age: 58
End: 2023-11-06
Payer: COMMERCIAL

## 2023-11-06 DIAGNOSIS — E11.65 TYPE 2 DIABETES MELLITUS WITH HYPERGLYCEMIA, WITH LONG-TERM CURRENT USE OF INSULIN (HCC): ICD-10-CM

## 2023-11-06 DIAGNOSIS — Z79.4 CURRENT USE OF INSULIN (HCC): ICD-10-CM

## 2023-11-06 DIAGNOSIS — Z79.4 TYPE 2 DIABETES MELLITUS WITH HYPERGLYCEMIA, WITH LONG-TERM CURRENT USE OF INSULIN (HCC): ICD-10-CM

## 2023-11-06 LAB
ANION GAP SERPL CALCULATED.3IONS-SCNC: 9 MMOL/L
BUN SERPL-MCNC: 12 MG/DL (ref 5–25)
CALCIUM SERPL-MCNC: 9.3 MG/DL (ref 8.4–10.2)
CHLORIDE SERPL-SCNC: 95 MMOL/L (ref 96–108)
CO2 SERPL-SCNC: 27 MMOL/L (ref 21–32)
CREAT SERPL-MCNC: 0.87 MG/DL (ref 0.6–1.3)
EST. AVERAGE GLUCOSE BLD GHB EST-MCNC: 166 MG/DL
GFR SERPL CREATININE-BSD FRML MDRD: 93 ML/MIN/1.73SQ M
GLUCOSE P FAST SERPL-MCNC: 140 MG/DL (ref 65–99)
HBA1C MFR BLD: 7.4 %
POTASSIUM SERPL-SCNC: 4.6 MMOL/L (ref 3.5–5.3)
SODIUM SERPL-SCNC: 131 MMOL/L (ref 135–147)

## 2023-11-06 PROCEDURE — 36415 COLL VENOUS BLD VENIPUNCTURE: CPT

## 2023-11-06 PROCEDURE — 82985 ASSAY OF GLYCATED PROTEIN: CPT

## 2023-11-06 PROCEDURE — 83036 HEMOGLOBIN GLYCOSYLATED A1C: CPT

## 2023-11-06 PROCEDURE — 80048 BASIC METABOLIC PNL TOTAL CA: CPT

## 2023-11-07 LAB — FRUCTOSAMINE SERPL-SCNC: 280 UMOL/L (ref 0–285)

## 2023-11-08 DIAGNOSIS — Z79.4 CURRENT USE OF INSULIN (HCC): ICD-10-CM

## 2023-11-08 DIAGNOSIS — Z79.4 TYPE 2 DIABETES MELLITUS WITH HYPERGLYCEMIA, WITH LONG-TERM CURRENT USE OF INSULIN (HCC): ICD-10-CM

## 2023-11-08 DIAGNOSIS — E11.65 TYPE 2 DIABETES MELLITUS WITH HYPERGLYCEMIA, WITH LONG-TERM CURRENT USE OF INSULIN (HCC): ICD-10-CM

## 2023-11-08 RX ORDER — ACYCLOVIR 400 MG/1
1 TABLET ORAL
Qty: 9 EACH | Refills: 2 | Status: SHIPPED | OUTPATIENT
Start: 2023-11-08

## 2023-11-13 ENCOUNTER — TELEPHONE (OUTPATIENT)
Age: 58
End: 2023-11-13

## 2023-11-13 NOTE — TELEPHONE ENCOUNTER
Scheduled date of colonoscopy (as of today):  02.20.2024    Physician performing colonoscopy:   Dr. Lucrecia Krabbe    Location of colonoscopy: [facility]    Bowel prep reviewed with patient:  Miralax/Dulcolax    Patient is insulin dependent.

## 2023-11-13 NOTE — TELEPHONE ENCOUNTER
11/13/23  Screened by: Claire Garcia    Referring Provider Dr. Samy Vance    Pre- Screening: Colon Screening     There is no height or weight on file to calculate BMI.  170lb  5 10"  Has patient been referred for a routine screening Colonoscopy? yes  Is the patient between 43-73 years old? yes      Previous Colonoscopy yes   If yes:    Date: 07/09/2020         SCHEDULING STAFF: If the patient is between 39yrs-51yrs, please advise patient to confirm benefits/coverage with their insurance company for a routine screening colonoscopy, some insurance carriers will only cover at [address] [place] [address] or older. If the patient is over 66years old, please schedule an office visit. Does the patient want to see a Gastroenterologist prior to their procedure OR are they having any GI symptoms? no    Has the patient been hospitalized or had abdominal surgery in the past 6 months? no    Does the patient use supplemental oxygen? no    Does the patient take Coumadin, Lovenox, Plavix, Elliquis, Xarelto, or other blood thinning medication? no    Has the patient had a stroke, cardiac event, or stent placed in the past year? no    SCHEDULING STAFF: If patient answers NO to above questions, then schedule procedure. If patient answers YES to above questions, then schedule office appointment. If patient is between 45yrs - 49yrs, please advise patient that we will have to confirm benefits & coverage with their insurance company for a routine screening colonoscopy.   PASS OA

## 2023-11-13 NOTE — TELEPHONE ENCOUNTER
COLONOSCOPY  MIRALAX/Dulcolax Bowel Preparation Instructions    The OR/GI Lab will contact you the evening prior to your procedure with your exact arrival time. Our practice requires a 1 week notice for any cancellations or rescheduling. We kindly ask that you immediately notify us of any changes including any new medications that are prescribed. Thank you for your cooperation. WEEK BEFORE YOUR PROCEDURE:  Stop taking Iron tablets. 5 days prior, AVOID vegetables and fruits with skins or seeds, nuts, corn, popcorn and whole grain breads. Purchase: One (1) 238-gram container of Miralax (polyethylene glycol 3350), four (4) 5 mg Dulcolax (bisacodyl) tablets, and one (1) 64-ounce bottle of Gatorade (sports drink) - no red, orange, or purple. These may be purchased at any pharmacy without a prescription. Generic products are permissible. Arrange responsible transportation for day of the procedure. DAY BEFORE THE PROCEDURE:   CLEAR liquids only for entire day prior. Nothing red, orange or purple. You MAY have:                                                               Soda  Water  Broth Gatorade  Jello  Popsicles Coffee/tea without milk/creamer     YOU MAY NOT HAVE:  Solid foods   Milk and milk products    Juice with pulp    BOWEL PREPARATION:  Includes: One (1) 238-gram container of Miralax (polyethylene glycol 3350), four (4) 5 mg Dulcolax (bisacodyl) tablets, and one (1) 64-ounce bottle of Gatorade (sports drink). Preparation may be refrigerated. Entire bowel prep should be completed. Afternoon before the procedure (2:00 pm - 5:00 pm): Take two (2) 5 mg Dulcolax laxative tablets. Evening before the procedure (6:00 pm):  Mix entire container of Miralax with one (1) 64-ounce bottle of Gatorade and shake until all medication is dissolved. Begin drinking solution. Drink an eight (8) ounce cup every 10-15 minutes until you have consumed half (32 ounces) of the solution.   Refrigerate remaining solution. Night before the procedure (8:00 pm): Take two (2) 5 mg Dulcolax laxative tablets. Beginning 5 hours before your procedure:  Drink the remaining amount of prepared solution (32 ounces). Drink an eight (8) ounce cup every 10-15 minutes until you have consumed the remaining solution. Bowel prep should be completed 4 hours prior to procedure time. NOTHING TO EAT OR DRINK AFTER MIDNIGHT- EXCEPT FOR YOUR PREP    DAY OF THE PROCEDURE:  You may brush your teeth. Leave all jewelry at home. Please arrive for your procedure as indicated by the OR / GI Lab / Endoscopy Unit. The hospital will contact you the day before with your exact arrival time. Make sure you have arranged ahead of time for a responsible adult (18 or older) to accompany and drive you home after the procedure. Please discuss any transportation concerns with our staff prior to your procedure. The effects of the anesthesia can persist for 24 hours. After receiving the sedation, you must exercise caution before engaging in any activity that could harm yourself and others (such as driving a car). Do not make any important decisions or do not drink any alcoholic beverages during this time period. After your procedure, you may have anything you'd like to eat or drink. You will probably want to start with something light. Please include plenty of fluids. Avoid items that cause gas such as sodas and salads. SPECIAL INSTRUCTIONS:    For patients currently taking blood thinners and/or antiplatelet therapy our office will contact the prescribing provider. Our office will contact you with any required changes to your medication regimen. Blood thinner (i.e. - Coumadin, Pradaxa, Lovenox, Xarelto, Eliquis)  ? Continue (Do Not Stop)  ? Stop______________for_____________days prior to the procedure. Antiplatelet (i.e. - Plavix, Aggrenox, Effient, Brilinta)  ?   Continue (Do Not Stop)  ? Stop______________for_____________days prior to the procedure. Diabetes: If you are Diabetic, please see separate Diabetic Instruction Sheet. Prescribed medications:  Do not stop your aspirin, or any of your other medications (unless instructed otherwise). Take the rest of your prescribed medications with small sips of water at least 2 hours prior to your procedure. For any questions or concerns related to your bowel preparation or pre-procedure instructions, please contact our office at [phone]. Thank you for choosing [facility]!

## 2023-11-17 DIAGNOSIS — Z79.4 CURRENT USE OF INSULIN (HCC): ICD-10-CM

## 2023-11-17 DIAGNOSIS — Z79.4 TYPE 2 DIABETES MELLITUS WITH HYPERGLYCEMIA, WITH LONG-TERM CURRENT USE OF INSULIN (HCC): ICD-10-CM

## 2023-11-17 DIAGNOSIS — E11.65 TYPE 2 DIABETES MELLITUS WITH HYPERGLYCEMIA, WITH LONG-TERM CURRENT USE OF INSULIN (HCC): ICD-10-CM

## 2023-11-17 RX ORDER — INSULIN ASPART 100 [IU]/ML
INJECTION, SUSPENSION SUBCUTANEOUS
Qty: 15 ML | Refills: 0 | Status: SHIPPED | OUTPATIENT
Start: 2023-11-17

## 2023-11-19 DIAGNOSIS — Z79.4 CURRENT USE OF INSULIN (HCC): ICD-10-CM

## 2023-11-19 DIAGNOSIS — E11.65 TYPE 2 DIABETES MELLITUS WITH HYPERGLYCEMIA, WITH LONG-TERM CURRENT USE OF INSULIN (HCC): ICD-10-CM

## 2023-11-19 DIAGNOSIS — Z79.4 TYPE 2 DIABETES MELLITUS WITH HYPERGLYCEMIA, WITH LONG-TERM CURRENT USE OF INSULIN (HCC): ICD-10-CM

## 2023-11-30 ENCOUNTER — FOLLOW UP (OUTPATIENT)
Dept: URBAN - METROPOLITAN AREA CLINIC 6 | Facility: CLINIC | Age: 58
End: 2023-11-30

## 2023-11-30 DIAGNOSIS — H52.13: ICD-10-CM

## 2023-11-30 PROCEDURE — 92015 DETERMINE REFRACTIVE STATE: CPT

## 2023-11-30 ASSESSMENT — VISUAL ACUITY
OD_CC: 20/400
OS_CC: 20/30+1

## 2023-11-30 ASSESSMENT — TONOMETRY
OS_IOP_MMHG: 12
OD_IOP_MMHG: 9

## 2024-01-02 NOTE — PROGRESS NOTES
Established Patient Progress Note    CC: Follow up for type 2 diabetes mellitus    Impression & Plan:    Problem List Items Addressed This Visit          Endocrine    Type 2 diabetes mellitus with hyperglycemia, with long-term current use of insulin (HCC) - Primary     CGM report reviewed. There is hyperglycemia around lunch time.    Plan:   Increase novolog 70/30 with breakfast to 9 units  Continue to monitor diet and exercise     Lab Results   Component Value Date    HGBA1C 7.4 (H) 11/06/2023            Relevant Medications    NovoLOG Mix 70/30 FlexPen (70-30) 100 units/mL injection pen    Other Relevant Orders    Hemoglobin A1C    Albumin / creatinine urine ratio       Cardiovascular and Mediastinum    Essential hypertension     Stable in office. Continue current regimen            Other    Mixed hyperlipidemia     Update prior to next visit. Order given. Continue with statin         Relevant Orders    Lipid panel- Lab Collect    Current use of insulin (HCC)    Relevant Medications    NovoLOG Mix 70/30 FlexPen (70-30) 100 units/mL injection pen       Orders Placed This Encounter   Procedures    Hemoglobin A1C     Standing Status:   Future     Standing Expiration Date:   1/4/2025    Lipid panel- Lab Collect     This is a patient instruction: This test requires patient fasting for 10-12 hours or longer. Drinking of black coffee or black tea is acceptable.     Standing Status:   Future     Standing Expiration Date:   1/4/2025    Albumin / creatinine urine ratio     Standing Status:   Future     Standing Expiration Date:   1/4/2025       History of Present Illness:     Nixon Bailey is a 58 y.o. male with a history of type 2 diabetes, hypertension, hyperlipidemia, DARYL, GERD. Complications:  idiopathic pancreatitis. Last A1C was 7.4. Most recent fructosamine normal 280. Home glucose monitoring: CGM    Patient receives IV steroids every 6 months (Dec/Zulema) for autoimmune disorder.    Hypoglycemia: no  Recent  hospitalizations or illnesses: no  Problems with current regimen: no    CGM Interpretation  Nixon DOAN Bailey   Device used   Home use   Indication: Type 2 Diabetes  More than 72 hours of data was reviewed. Report to be scanned to chart.   Date Range: Dec 22 to Jan 4, 2024  Analysis of data:   Average Glucose: 151  SD : 33   Time in Target Range: 87%   Time Above Range: high: 11%; very high: 2%  Time Below Range: 0%   Interpretation of data: hyperglycemia around lunch time     Current regimen:   Jardiance 10 mg daily  Metformin 1,000 mg BID  Novolog 70/30 7 units before breakfast and 7 units before dinner    Last Eye Exam: UTD, 5/16/23- Dr. Zarate  Last Foot Exam: UTD, 2/7/23    ACE/ARB: lisinopril  Has hyperlipidemia: Taking rosuvastatin     He has no complaints today.    Patient Active Problem List   Diagnosis    Gastroesophageal reflux disease without esophagitis    IgG4 related disease    Idiopathic acute pancreatitis without infection or necrosis    Mixed hyperlipidemia    Type 2 diabetes mellitus with hyperglycemia, with long-term current use of insulin (HCC)    Essential hypertension    Current use of insulin (HCC)    DARYL (obstructive sleep apnea)    Long term current use of immunosuppressive drug      Past Medical History:   Diagnosis Date    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HCC)     Diabetes mellitus (HCC)     blood sugar 121 @ 0600    GERD (gastroesophageal reflux disease)     IgG4 related disease     Sarcoid       Past Surgical History:   Procedure Laterality Date    COLONOSCOPY      ESOPHAGOGASTRODUODENOSCOPY N/A 4/2/2019    Procedure: ESOPHAGOGASTRODUODENOSCOPY (EGD);  Surgeon: Trey Singleton MD;  Location: [facility] GI LAB;  Service: Gastroenterology    LYMPH NODE BIOPSY      LYMPH NODE DISSECTION  2009, left axilla    benign on pathology    OPTIC NERVE DECOMPRESSION      NE ESOPHAGOGASTRODUODENOSCOPY TRANSORAL DIAGNOSTIC N/A 5/26/2017    Procedure: EGD AND COLONOSCOPY;  Surgeon: Trey Singleton MD;  Location:  GI  LAB;  Service: Gastroenterology    SINUS SURGERY      SINUS SURGERY      WISDOM TOOTH EXTRACTION        Family History   Problem Relation Age of Onset    Alzheimer's disease Mother     Hypertension Mother     Osteoporosis Mother     Hypertension Father     Heart attack Father     Osteoporosis Sister     Diabetes Brother     Diabetes type II Brother     Hyperthyroidism Brother     Diabetes type II Paternal Aunt      Social History     Tobacco Use    Smoking status: Former     Current packs/day: 0.00     Average packs/day: 1 pack/day for 25.0 years (25.0 ttl pk-yrs)     Types: Cigarettes     Start date: 1991     Quit date: 2016     Years since quittin.7    Smokeless tobacco: Never   Substance Use Topics    Alcohol use: Not Currently     Comment: rarely     Allergies   Allergen Reactions    Cefaclor Hives and Rash     Tolerate amoxicillin           Current Outpatient Medications:     Ascorbic Acid (VITAMIN C) 1000 MG tablet, Take 1,000 mg by mouth daily, Disp: , Rfl:     b complex vitamins tablet, Take 1 tablet by mouth daily, Disp: , Rfl:     Continuous Blood Gluc Sensor (Dexcom G6 Sensor) MISC, Change sensor every 10 days, Disp: 1 each, Rfl: 0    Continuous Blood Gluc Sensor (Dexcom G7 Sensor), Use 1 Device every 10 days, Disp: 9 each, Rfl: 2    Cyanocobalamin (VITAMIN B12 PO), Take 1 tablet by mouth daily, Disp: , Rfl:     Diclofenac Sodium (VOLTAREN) 1 %, Apply 2 g topically 2 (two) times a day as needed (knee pain/OA), Disp: 1 Tube, Rfl: 0    Droplet Pen Needles 31G X 8 MM MISC, INJECT UNDER THE SKIN 4 (FOUR) TIMES A DAY, Disp: 400 each, Rfl: 1    Empagliflozin (Jardiance) 10 MG TABS tablet, take 1 tablet by mouth once daily, Disp: 30 tablet, Rfl: 4    famotidine (PEPCID) 40 MG tablet, Take 1 tablet (40 mg total) by mouth 2 (two) times a day (Patient taking differently: Take 20 mg by mouth 2 (two) times a day), Disp: 180 tablet, Rfl: 3    FLUTICASONE PROPIONATE, INHAL, IN, Inhale 2 sprays 2 (two)  times a day, Disp: , Rfl:     folic acid (FOLVITE) 1 mg tablet, Take 1 mg by mouth daily, Disp: , Rfl:     glucose blood test strip, onetouch     calvin ultra, Disp: , Rfl:     Insulin Pen Needle (B-D ULTRAFINE III SHORT PEN) 31G X 8 MM MISC, Use 2 (two) times a day, Disp: 200 each, Rfl: 1    latanoprost (XALATAN) 0.005 % ophthalmic solution, 1 drop daily at bedtime, Disp: , Rfl:     lisinopril (ZESTRIL) 20 mg tablet, Take 1 tablet (20 mg total) by mouth daily, Disp: 90 tablet, Rfl: 2    metFORMIN (GLUCOPHAGE) 1000 MG tablet, TAKE 1 TABLET BY MOUTH TWICE A DAY WITH MEALS, Disp: 60 tablet, Rfl: 5    methotrexate 2.5 mg tablet, 15 mg once a week, Disp: , Rfl:     Multiple Vitamins-Minerals (PRESERVISION AREDS PO), Take 1 tablet by mouth daily, Disp: , Rfl:     multivitamin (THERAGRAN) TABS, Take 1 tablet by mouth daily, Disp: , Rfl:     mupirocin (BACTROBAN) 2 % ointment, DILUTE 1 INCH OF MUPIROCIN OINT IN 8 OZ. OF SALINE IRRIGATE EACH NOSTRIL WITH 4 OZ. TWICE A DAY, Disp: , Rfl:     NovoLOG Mix 70/30 FlexPen (70-30) 100 units/mL injection pen, INJECT 9 UNITS UNDER THE SKIN JUST PRIOR TO BREAKFAST AND JUST PRIOR TO DINNER, Disp: 15 mL, Rfl: 0    Omega-3 Fatty Acids (FISH OIL PO), Take 1 capsule by mouth daily, Disp: , Rfl:     OneTouch Ultra test strip, Check up to 3 times daily as needed before meals and at bedtime., Disp: 300 strip, Rfl: 2    riTUXimab (RITUXAN) injection, Infuse into a venous catheter , Disp: , Rfl:     rosuvastatin (CRESTOR) 10 MG tablet, Take 1 tablet (10 mg total) by mouth daily, Disp: 90 tablet, Rfl: 1    Vitamin D, Cholecalciferol, 1000 units CAPS, Take 1 tablet by mouth in the morning, Disp: , Rfl:     Review of Systems   Constitutional:  Negative for chills and fever.   HENT:  Negative for ear pain and sore throat.    Eyes:  Negative for pain and visual disturbance.   Respiratory:  Negative for cough and shortness of breath.    Cardiovascular:  Negative for chest pain and palpitations.  "  Gastrointestinal:  Negative for abdominal pain and vomiting.   Genitourinary:  Negative for dysuria and hematuria.   Musculoskeletal:  Negative for arthralgias and back pain.   Skin:  Negative for color change and rash.   Neurological:  Negative for seizures and syncope.   All other systems reviewed and are negative.      Physical Exam:  Body mass index is 24.31 kg/m².  /78 (BP Location: Left arm, Patient Position: Sitting, Cuff Size: Adult)   Pulse 75   Ht 5' 10\" (1.778 m)   Wt 76.8 kg (169 lb 6.4 oz)   SpO2 99%   BMI 24.31 kg/m²    Wt Readings from Last 3 Encounters:   01/04/24 76.8 kg (169 lb 6.4 oz)   09/11/23 76.7 kg (169 lb)   08/22/23 78.6 kg (173 lb 3.2 oz)       Physical Exam  Vitals reviewed.   Constitutional:       Appearance: Normal appearance.   HENT:      Head: Normocephalic and atraumatic.   Cardiovascular:      Rate and Rhythm: Normal rate.      Heart sounds: Normal heart sounds.   Pulmonary:      Effort: Pulmonary effort is normal.      Breath sounds: Normal breath sounds.   Neurological:      Mental Status: He is alert and oriented to person, place, and time.   Psychiatric:         Mood and Affect: Mood normal.         Behavior: Behavior normal.       Diabetic Foot Exam    Labs:   Lab Results   Component Value Date    HGBA1C 7.4 (H) 11/06/2023    HGBA1C 7.7 (H) 08/03/2023    HGBA1C 8.1 (H) 03/24/2023     Lab Results   Component Value Date    CREATININE 0.87 11/06/2023    CREATININE 1.10 08/03/2023    CREATININE 1.00 03/24/2023    BUN 12 11/06/2023    K 4.6 11/06/2023    CL 95 (L) 11/06/2023    CO2 27 11/06/2023     eGFR   Date Value Ref Range Status   11/06/2023 93 ml/min/1.73sq m Final     Lab Results   Component Value Date    HDL 59 07/18/2022    TRIG 108 07/18/2022     Lab Results   Component Value Date    ALT 31 03/24/2023    AST 14 03/24/2023    ALKPHOS 80 03/24/2023     Lab Results   Component Value Date    ZLS6PWQOCGEA 2.210 09/24/2021    [id number] 2.000 02/08/2020     No " "results found for: \"FREET4\", \"TSI\"      There are no Patient Instructions on file for this visit.      Discussed with the patient and all questioned fully answered. He will call me if any problems arise.        "

## 2024-01-04 ENCOUNTER — OFFICE VISIT (OUTPATIENT)
Dept: ENDOCRINOLOGY | Facility: CLINIC | Age: 59
End: 2024-01-04
Payer: COMMERCIAL

## 2024-01-04 VITALS
OXYGEN SATURATION: 99 % | DIASTOLIC BLOOD PRESSURE: 78 MMHG | WEIGHT: 169.4 LBS | SYSTOLIC BLOOD PRESSURE: 124 MMHG | HEIGHT: 70 IN | HEART RATE: 75 BPM | BODY MASS INDEX: 24.25 KG/M2

## 2024-01-04 DIAGNOSIS — Z79.4 TYPE 2 DIABETES MELLITUS WITH HYPERGLYCEMIA, WITH LONG-TERM CURRENT USE OF INSULIN (HCC): Primary | ICD-10-CM

## 2024-01-04 DIAGNOSIS — E11.65 TYPE 2 DIABETES MELLITUS WITH HYPERGLYCEMIA, WITH LONG-TERM CURRENT USE OF INSULIN (HCC): Primary | ICD-10-CM

## 2024-01-04 DIAGNOSIS — I10 ESSENTIAL HYPERTENSION: ICD-10-CM

## 2024-01-04 DIAGNOSIS — E78.2 MIXED HYPERLIPIDEMIA: ICD-10-CM

## 2024-01-04 DIAGNOSIS — Z79.4 CURRENT USE OF INSULIN (HCC): ICD-10-CM

## 2024-01-04 PROCEDURE — 95251 CONT GLUC MNTR ANALYSIS I&R: CPT

## 2024-01-04 PROCEDURE — 99214 OFFICE O/P EST MOD 30 MIN: CPT

## 2024-01-04 RX ORDER — INSULIN ASPART 100 [IU]/ML
INJECTION, SUSPENSION SUBCUTANEOUS
Qty: 15 ML | Refills: 0 | Status: SHIPPED | OUTPATIENT
Start: 2024-01-04

## 2024-01-04 NOTE — ASSESSMENT & PLAN NOTE
CGM report reviewed. There is hyperglycemia around lunch time.    Plan:   Increase novolog 70/30 with breakfast to 9 units  Continue to monitor diet and exercise     Lab Results   Component Value Date    HGBA1C 7.4 (H) 11/06/2023

## 2024-01-15 DIAGNOSIS — E11.65 TYPE 2 DIABETES MELLITUS WITH HYPERGLYCEMIA, WITH LONG-TERM CURRENT USE OF INSULIN (HCC): ICD-10-CM

## 2024-01-15 DIAGNOSIS — Z79.4 TYPE 2 DIABETES MELLITUS WITH HYPERGLYCEMIA, WITH LONG-TERM CURRENT USE OF INSULIN (HCC): ICD-10-CM

## 2024-01-15 DIAGNOSIS — Z79.4 CURRENT USE OF INSULIN (HCC): ICD-10-CM

## 2024-01-16 RX ORDER — INSULIN ASPART 100 [IU]/ML
INJECTION, SUSPENSION SUBCUTANEOUS
Qty: 15 ML | Refills: 3 | Status: SHIPPED | OUTPATIENT
Start: 2024-01-16

## 2024-01-30 DIAGNOSIS — E78.2 MIXED HYPERLIPIDEMIA: ICD-10-CM

## 2024-01-30 RX ORDER — ROSUVASTATIN CALCIUM 10 MG/1
10 TABLET, COATED ORAL DAILY
Qty: 90 TABLET | Refills: 3 | Status: SHIPPED | OUTPATIENT
Start: 2024-01-30 | End: 2024-01-31 | Stop reason: SDUPTHER

## 2024-01-31 DIAGNOSIS — E78.2 MIXED HYPERLIPIDEMIA: ICD-10-CM

## 2024-01-31 RX ORDER — ROSUVASTATIN CALCIUM 10 MG/1
10 TABLET, COATED ORAL DAILY
Qty: 90 TABLET | Refills: 3 | Status: SHIPPED | OUTPATIENT
Start: 2024-01-31

## 2024-02-06 ENCOUNTER — ANESTHESIA EVENT (OUTPATIENT)
Dept: ANESTHESIOLOGY | Facility: HOSPITAL | Age: 59
End: 2024-02-06

## 2024-02-06 ENCOUNTER — ANESTHESIA (OUTPATIENT)
Dept: ANESTHESIOLOGY | Facility: HOSPITAL | Age: 59
End: 2024-02-06

## 2024-02-07 ENCOUNTER — TELEPHONE (OUTPATIENT)
Dept: GASTROENTEROLOGY | Facility: CLINIC | Age: 59
End: 2024-02-07

## 2024-02-20 ENCOUNTER — HOSPITAL ENCOUNTER (OUTPATIENT)
Dept: GASTROENTEROLOGY | Facility: AMBULARY SURGERY CENTER | Age: 59
Setting detail: OUTPATIENT SURGERY
Discharge: HOME | End: 2024-02-20
Attending: INTERNAL MEDICINE
Payer: COMMERCIAL

## 2024-02-20 ENCOUNTER — ANESTHESIA (OUTPATIENT)
Dept: GASTROENTEROLOGY | Facility: AMBULARY SURGERY CENTER | Age: 59
End: 2024-02-20

## 2024-02-20 ENCOUNTER — ANESTHESIA EVENT (OUTPATIENT)
Dept: GASTROENTEROLOGY | Facility: AMBULARY SURGERY CENTER | Age: 59
End: 2024-02-20

## 2024-02-20 VITALS
DIASTOLIC BLOOD PRESSURE: 84 MMHG | HEART RATE: 69 BPM | HEIGHT: 70 IN | RESPIRATION RATE: 16 BRPM | BODY MASS INDEX: 23.77 KG/M2 | SYSTOLIC BLOOD PRESSURE: 137 MMHG | TEMPERATURE: 97 F | OXYGEN SATURATION: 97 % | WEIGHT: 166 LBS

## 2024-02-20 DIAGNOSIS — Z12.11 SCREENING FOR COLON CANCER: ICD-10-CM

## 2024-02-20 PROCEDURE — 88305 TISSUE EXAM BY PATHOLOGIST: CPT | Performed by: PATHOLOGY

## 2024-02-20 PROCEDURE — 45385 COLONOSCOPY W/LESION REMOVAL: CPT | Performed by: INTERNAL MEDICINE

## 2024-02-20 RX ORDER — PROPOFOL 10 MG/ML
INJECTION, EMULSION INTRAVENOUS AS NEEDED
Status: DISCONTINUED | OUTPATIENT
Start: 2024-02-20 | End: 2024-02-20

## 2024-02-20 RX ORDER — SODIUM CHLORIDE, SODIUM LACTATE, POTASSIUM CHLORIDE, CALCIUM CHLORIDE 600; 310; 30; 20 MG/100ML; MG/100ML; MG/100ML; MG/100ML
INJECTION, SOLUTION INTRAVENOUS CONTINUOUS PRN
Status: DISCONTINUED | OUTPATIENT
Start: 2024-02-20 | End: 2024-02-20

## 2024-02-20 RX ADMIN — PROPOFOL 50 MG: 10 INJECTION, EMULSION INTRAVENOUS at 08:38

## 2024-02-20 RX ADMIN — PROPOFOL 50 MG: 10 INJECTION, EMULSION INTRAVENOUS at 08:41

## 2024-02-20 RX ADMIN — SODIUM CHLORIDE, SODIUM LACTATE, POTASSIUM CHLORIDE, AND CALCIUM CHLORIDE: .6; .31; .03; .02 INJECTION, SOLUTION INTRAVENOUS at 08:28

## 2024-02-20 RX ADMIN — PROPOFOL 50 MG: 10 INJECTION, EMULSION INTRAVENOUS at 08:37

## 2024-02-20 RX ADMIN — PROPOFOL 50 MG: 10 INJECTION, EMULSION INTRAVENOUS at 08:44

## 2024-02-20 RX ADMIN — PROPOFOL 100 MG: 10 INJECTION, EMULSION INTRAVENOUS at 08:31

## 2024-02-20 RX ADMIN — PROPOFOL 50 MG: 10 INJECTION, EMULSION INTRAVENOUS at 08:35

## 2024-02-20 RX ADMIN — PROPOFOL 50 MG: 10 INJECTION, EMULSION INTRAVENOUS at 08:48

## 2024-02-20 NOTE — ANESTHESIA POSTPROCEDURE EVALUATION
Post-Op Assessment Note    CV Status:  Stable  Pain Score: 0    Pain management: adequate       Mental Status:  Arousable and sleepy   Hydration Status:  Euvolemic   PONV Controlled:  Controlled   Airway Patency:  Patent     Post Op Vitals Reviewed: Yes    No anethesia notable event occurred.    Staff: JAN               /66 (02/20/24 0858)    Temp (!) 97 °F (36.1 °C) (02/20/24 0858)    Pulse 73 (02/20/24 0858)   Resp 17 (02/20/24 0858)    SpO2 97 % (02/20/24 0858)

## 2024-02-20 NOTE — ANESTHESIA PREPROCEDURE EVALUATION
"Procedure:  COLONOSCOPY    Relevant Problems   ANESTHESIA (within normal limits)      CARDIO   (+) Essential hypertension   (+) Mixed hyperlipidemia      ENDO   (+) Type 2 diabetes mellitus with hyperglycemia, with long-term current use of insulin (HCC)      GI/HEPATIC   (+) Gastroesophageal reflux disease without esophagitis   (+) Idiopathic acute pancreatitis without infection or necrosis      /RENAL (within normal limits)      GYN (within normal limits)      HEMATOLOGY (within normal limits)      MUSCULOSKELETAL (within normal limits)      NEURO/PSYCH (within normal limits)      PULMONARY   (+) DARYL (obstructive sleep apnea)      Lab Results   Component Value Date    WBC 5.73 03/24/2022    HGB 14.7 03/24/2022    HCT 42.2 03/24/2022    MCV 91 03/24/2022     03/24/2022     Lab Results   Component Value Date    SODIUM 131 (L) 11/06/2023    K 4.6 11/06/2023    CL 95 (L) 11/06/2023    CO2 27 11/06/2023    AGAP 9 11/06/2023    BUN 12 11/06/2023    CREATININE 0.87 11/06/2023    GLUC 121 (H) 03/16/2018    GLUF 140 (H) 11/06/2023    CALCIUM 9.3 11/06/2023    AST 14 03/24/2023    ALT 31 03/24/2023    ALKPHOS 80 03/24/2023    TP 6.9 03/24/2023    TBILI 0.43 03/24/2023    EGFR 93 11/06/2023     No results found for: \"PTT\"  No results found for: \"INR\", \"PROTIME\"      Physical Exam    Airway    Mallampati score: II  TM Distance: >3 FB  Neck ROM: full     Dental   No notable dental hx     Cardiovascular  Rhythm: regular, Rate: normal, Cardiovascular exam normal    Pulmonary  Pulmonary exam normal Breath sounds clear to auscultation    Other Findings        Anesthesia Plan  ASA Score- 2     Anesthesia Type- IV sedation with anesthesia with ASA Monitors.         Additional Monitors:     Airway Plan:            Plan Factors-Exercise tolerance (METS): >4 METS.    Chart reviewed. EKG reviewed. Imaging results reviewed. Existing labs reviewed. Patient summary reviewed.    Patient is not a current smoker.  Patient did not " smoke on day of surgery.    Obstructive sleep apnea risk education given perioperatively.        Induction- intravenous.    Postoperative Plan-     Informed Consent- Anesthetic plan and risks discussed with patient.  I personally reviewed this patient with the CRNA. Discussed and agreed on the Anesthesia Plan with the CRNA..

## 2024-02-20 NOTE — H&P
History and Physical - [facility]  Nixon Bailey Jr. 58 y.o. male MRN: [id number]                  HPI: Nixon Bailey Jr. is a 58 y.o. year old male who presents for colonoscopy for history of colon polyp.       REVIEW OF SYSTEMS: Per the HPI, and otherwise unremarkable.    Historical Information   Past Medical History:   Diagnosis Date    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HCC)     Colon polyp     Diabetes mellitus (HCC)     blood sugar 121 @ 0600    GERD (gastroesophageal reflux disease)     IgG4 related disease     Sarcoid      Past Surgical History:   Procedure Laterality Date    COLONOSCOPY      ESOPHAGOGASTRODUODENOSCOPY N/A 2019    Procedure: ESOPHAGOGASTRODUODENOSCOPY (EGD);  Surgeon: Trey Singleton MD;  Location: [facility] GI LAB;  Service: Gastroenterology    LYMPH NODE BIOPSY      LYMPH NODE DISSECTION  , left axilla    benign on pathology    OPTIC NERVE DECOMPRESSION      DC ESOPHAGOGASTRODUODENOSCOPY TRANSORAL DIAGNOSTIC N/A 2017    Procedure: EGD AND COLONOSCOPY;  Surgeon: Trey Singleton MD;  Location:  GI LAB;  Service: Gastroenterology    SINUS SURGERY      SINUS SURGERY      WISDOM TOOTH EXTRACTION       Social History   Social History     Substance and Sexual Activity   Alcohol Use Not Currently    Comment: rarely     Social History     Substance and Sexual Activity   Drug Use Never     Social History     Tobacco Use   Smoking Status Former    Current packs/day: 0.00    Average packs/day: 1 pack/day for 25.0 years (25.0 ttl pk-yrs)    Types: Cigarettes    Start date: 1991    Quit date: 2016    Years since quittin.8   Smokeless Tobacco Never     Family History   Problem Relation Age of Onset    Alzheimer's disease Mother     Hypertension Mother     Osteoporosis Mother     Hypertension Father     Heart attack Father     Osteoporosis Sister     Diabetes Brother     Diabetes type II Brother     Hyperthyroidism Brother     Diabetes type II Paternal Aunt   "      Meds/Allergies       Current Outpatient Medications:     Ascorbic Acid (VITAMIN C) 1000 MG tablet    b complex vitamins tablet    Cyanocobalamin (VITAMIN B12 PO)    famotidine (PEPCID) 40 MG tablet    FLUTICASONE PROPIONATE, INHAL, IN    folic acid (FOLVITE) 1 mg tablet    latanoprost (XALATAN) 0.005 % ophthalmic solution    lisinopril (ZESTRIL) 20 mg tablet    metFORMIN (GLUCOPHAGE) 1000 MG tablet    Multiple Vitamins-Minerals (PRESERVISION AREDS PO)    multivitamin (THERAGRAN) TABS    NovoLOG Mix 70/30 FlexPen (70-30) 100 units/mL injection pen    Omega-3 Fatty Acids (FISH OIL PO)    rosuvastatin (CRESTOR) 10 MG tablet    Vitamin D, Cholecalciferol, 1000 units CAPS    Continuous Blood Gluc Sensor (Dexcom G6 Sensor) MISC    Continuous Blood Gluc Sensor (Dexcom G7 Sensor)    Diclofenac Sodium (VOLTAREN) 1 %    Droplet Pen Needles 31G X 8 MM MISC    Empagliflozin (Jardiance) 10 MG TABS tablet    glucose blood test strip    Insulin Pen Needle (B-D ULTRAFINE III SHORT PEN) 31G X 8 MM MISC    methotrexate 2.5 mg tablet    mupirocin (BACTROBAN) 2 % ointment    OneTouch Ultra test strip    riTUXimab (RITUXAN) injection    Allergies   Allergen Reactions    Cefaclor Hives and Rash     Tolerate amoxicillin         Objective     /87   Pulse 81   Temp (!) 96.7 °F (35.9 °C) (Temporal)   Resp 18   Ht 5' 10\" (1.778 m)   Wt 75.3 kg (166 lb)   SpO2 95%   BMI 23.82 kg/m²       PHYSICAL EXAM    Gen: NAD  Head: NCAT  CV: RRR  CHEST: Clear  ABD: soft, NT/ND  EXT: no edema      ASSESSMENT/PLAN:  This is a 58 y.o. year old male here for colonoscopy, and he is stable and optimized for his procedure.       "

## 2024-02-21 PROCEDURE — 88305 TISSUE EXAM BY PATHOLOGIST: CPT | Performed by: PATHOLOGY

## 2024-03-16 ENCOUNTER — APPOINTMENT (OUTPATIENT)
Dept: LAB | Facility: CLINIC | Age: 59
End: 2024-03-16
Payer: COMMERCIAL

## 2024-03-16 DIAGNOSIS — Z79.4 TYPE 2 DIABETES MELLITUS WITH HYPERGLYCEMIA, WITH LONG-TERM CURRENT USE OF INSULIN (HCC): ICD-10-CM

## 2024-03-16 DIAGNOSIS — E11.65 TYPE 2 DIABETES MELLITUS WITH HYPERGLYCEMIA, WITH LONG-TERM CURRENT USE OF INSULIN (HCC): ICD-10-CM

## 2024-03-16 DIAGNOSIS — E78.2 MIXED HYPERLIPIDEMIA: ICD-10-CM

## 2024-03-16 LAB
CHOLEST SERPL-MCNC: 161 MG/DL
CREAT UR-MCNC: 69.4 MG/DL
EST. AVERAGE GLUCOSE BLD GHB EST-MCNC: 174 MG/DL
HBA1C MFR BLD: 7.7 %
HDLC SERPL-MCNC: 54 MG/DL
LDLC SERPL CALC-MCNC: 78 MG/DL (ref 0–100)
MICROALBUMIN UR-MCNC: <7 MG/L
MICROALBUMIN/CREAT 24H UR: <10 MG/G CREATININE (ref 0–30)
NONHDLC SERPL-MCNC: 107 MG/DL
TRIGL SERPL-MCNC: 144 MG/DL

## 2024-03-16 PROCEDURE — 36415 COLL VENOUS BLD VENIPUNCTURE: CPT

## 2024-03-16 PROCEDURE — 82043 UR ALBUMIN QUANTITATIVE: CPT

## 2024-03-16 PROCEDURE — 80061 LIPID PANEL: CPT

## 2024-03-16 PROCEDURE — 82570 ASSAY OF URINE CREATININE: CPT

## 2024-03-16 PROCEDURE — 83036 HEMOGLOBIN GLYCOSYLATED A1C: CPT

## 2024-03-21 DIAGNOSIS — E11.65 TYPE 2 DIABETES MELLITUS WITH HYPERGLYCEMIA, WITH LONG-TERM CURRENT USE OF INSULIN (HCC): ICD-10-CM

## 2024-03-21 DIAGNOSIS — Z79.4 TYPE 2 DIABETES MELLITUS WITH HYPERGLYCEMIA, WITH LONG-TERM CURRENT USE OF INSULIN (HCC): ICD-10-CM

## 2024-03-25 ENCOUNTER — TELEPHONE (OUTPATIENT)
Dept: ENDOCRINOLOGY | Facility: CLINIC | Age: 59
End: 2024-03-25

## 2024-04-01 ENCOUNTER — TELEPHONE (OUTPATIENT)
Age: 59
End: 2024-04-01

## 2024-04-01 NOTE — TELEPHONE ENCOUNTER
Patient called looking to schedule appointment to put on Dexcom G7 sensor.     [place] Patient is hoping we can set up this appointment back to back with that one.

## 2024-04-04 ENCOUNTER — TELEPHONE (OUTPATIENT)
Age: 59
End: 2024-04-04

## 2024-04-08 ENCOUNTER — OFFICE VISIT (OUTPATIENT)
Dept: ENDOCRINOLOGY | Facility: CLINIC | Age: 59
End: 2024-04-08
Payer: COMMERCIAL

## 2024-04-08 VITALS
HEIGHT: 70 IN | BODY MASS INDEX: 24.6 KG/M2 | HEART RATE: 90 BPM | DIASTOLIC BLOOD PRESSURE: 94 MMHG | SYSTOLIC BLOOD PRESSURE: 138 MMHG | OXYGEN SATURATION: 96 % | WEIGHT: 171.8 LBS

## 2024-04-08 DIAGNOSIS — Z79.4 TYPE 2 DIABETES MELLITUS WITH HYPERGLYCEMIA, WITH LONG-TERM CURRENT USE OF INSULIN (HCC): Primary | ICD-10-CM

## 2024-04-08 DIAGNOSIS — Z79.4 CURRENT USE OF INSULIN (HCC): ICD-10-CM

## 2024-04-08 DIAGNOSIS — E11.65 TYPE 2 DIABETES MELLITUS WITH HYPERGLYCEMIA, WITH LONG-TERM CURRENT USE OF INSULIN (HCC): Primary | ICD-10-CM

## 2024-04-08 PROCEDURE — 99214 OFFICE O/P EST MOD 30 MIN: CPT | Performed by: INTERNAL MEDICINE

## 2024-04-08 PROCEDURE — 95251 CONT GLUC MNTR ANALYSIS I&R: CPT | Performed by: INTERNAL MEDICINE

## 2024-04-08 RX ORDER — INSULIN ASPART 100 [IU]/ML
INJECTION, SUSPENSION SUBCUTANEOUS
Qty: 15 ML | Refills: 3 | Status: SHIPPED | OUTPATIENT
Start: 2024-04-08

## 2024-04-08 NOTE — PROGRESS NOTES
Established Patient Progress Note      Chief Complaint   Patient presents with    Diabetes Type 2      Referring Provider  No referring provider defined for this encounter.     History of Present Illness:   Nixon Bailey Jr. is a 58 y.o. male with a history of type 2 diabetes, last seen in Jan 2024 with Valarie PERSON      Diabetes hx:   He was dx with T2DM in 2002, but started insulin in 2016.    He has been seen by GI for idiopathic pancreatitis. He recalls one discrete episode of pancreatitis, but also several times when he had back pain.       Of note, he had COVID in Oct 2022. Bgs were elevated and with steroid use    He was taking Janumet and glimepiride initially, but was changed to Novolog 70/30 after the episode of pancreatitis.   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 He follows closely with Rheum and Ophthal (Samuel). For maintenance, he gets Rituximab and methotrexate. He is getting Rituximab every 6mos. Bgs were elevated with steroids. He recently has been getting some symptoms similar to his prior orbital symptoms and Rheum (Dr. Post) has ordered an MRI.      He works as an , but tried to follow a very good effort  Current regimen: Novolog 70/30 9units bf and 7units dinner, metformin 1g twice daily and Jardiance 10mg once daily.    Exercise is walking 1.5-2miles daily.       Eyes: May 2023, Dr. Zarate monitoring for glaucoma; also seen [facility] Jan 2024  Pod: self care also seen Dr Knox in June 2023 has orthotics      He does take lisinopril and simvastatin without issues. Still receiving Dexcom G6 from his DME supplier.    Nixon Bailey Jr.   Device used   Home use Dexcom G6    Indication   Type 2 Diabetes    More than 72 hours of data was reviewed. Report to be scanned to chart.     Date Range:   3/26/24-4/8/2024    Analysis of data:   Average Glucose: 137  Coefficient of Variation: n/a  SD : 29mg/dL  Time in  Target Range: 91%  Time Above Range: 8% <1% v high  Time Below Range: 0     Interpretation of data:   No hypoglycemia. Mild midday mild elevation.       Patient Active Problem List   Diagnosis    Gastroesophageal reflux disease without esophagitis    IgG4 related disease    Idiopathic acute pancreatitis without infection or necrosis    Mixed hyperlipidemia    Type 2 diabetes mellitus with hyperglycemia, with long-term current use of insulin (HCC)    Essential hypertension    Current use of insulin (HCC)    DARYL (obstructive sleep apnea)    Long term current use of immunosuppressive drug      Past Medical History:   Diagnosis Date    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HCC)     Colon polyp     Diabetes mellitus (HCC)     blood sugar 121 @ 0600    GERD (gastroesophageal reflux disease)     IgG4 related disease     Sarcoid       Past Surgical History:   Procedure Laterality Date    COLONOSCOPY      ESOPHAGOGASTRODUODENOSCOPY N/A 4/2/2019    Procedure: ESOPHAGOGASTRODUODENOSCOPY (EGD);  Surgeon: Trey Singleton MD;  Location: [facility] GI LAB;  Service: Gastroenterology    LYMPH NODE BIOPSY      LYMPH NODE DISSECTION  2009, left axilla    benign on pathology    OPTIC NERVE DECOMPRESSION      [place] ESOPHAGOGASTRODUODENOSCOPY TRANSORAL DIAGNOSTIC N/A 5/26/2017    Procedure: EGD AND COLONOSCOPY;  Surgeon: Trey Singleton MD;  Location:  GI LAB;  Service: Gastroenterology    SINUS SURGERY      SINUS SURGERY      WISDOM TOOTH EXTRACTION        Family History   Problem Relation Age of Onset    Alzheimer's disease Mother     Hypertension Mother     Osteoporosis Mother     Hypertension Father     Heart attack Father     Osteoporosis Sister     Diabetes Brother     Diabetes type II Brother     Hyperthyroidism Brother     Diabetes type II Paternal Aunt      Social History     Tobacco Use    Smoking status: Former     Current packs/day: 0.00     Average packs/day: 1 pack/day for 25.0 years (25.0 ttl pk-yrs)     Types: Cigarettes     Start date:  1991     Quit date: 2016     Years since quittin.9    Smokeless tobacco: Never   Substance Use Topics    Alcohol use: Not Currently     Comment: rarely     Allergies   Allergen Reactions    Cefaclor Hives and Rash     Tolerate amoxicillin           Current Outpatient Medications:     Ascorbic Acid (VITAMIN C) 1000 MG tablet, Take 1,000 mg by mouth daily, Disp: , Rfl:     b complex vitamins tablet, Take 1 tablet by mouth daily, Disp: , Rfl:     Continuous Blood Gluc Sensor (Dexcom G7 Sensor), Use 1 Device every 10 days, Disp: 9 each, Rfl: 2    Cyanocobalamin (VITAMIN B12 PO), Take 1 tablet by mouth daily, Disp: , Rfl:     Diclofenac Sodium (VOLTAREN) 1 %, Apply 2 g topically 2 (two) times a day as needed (knee pain/OA), Disp: 1 Tube, Rfl: 0    Empagliflozin 25 MG TABS, Take 1 tablet (25 mg total) by mouth every morning, Disp: 30 tablet, Rfl: 4    famotidine (PEPCID) 40 MG tablet, Take 1 tablet (40 mg total) by mouth 2 (two) times a day (Patient taking differently: Take 20 mg by mouth 2 (two) times a day), Disp: 180 tablet, Rfl: 3    FLUTICASONE PROPIONATE, INHAL, IN, Inhale 2 sprays 2 (two) times a day, Disp: , Rfl:     folic acid (FOLVITE) 1 mg tablet, Take 1 mg by mouth daily, Disp: , Rfl:     glucose blood test strip, onetouch     calvin ultra, Disp: , Rfl:     Insulin Pen Needle (B-D ULTRAFINE III SHORT PEN) 31G X 8 MM MISC, Use 2 (two) times a day, Disp: 200 each, Rfl: 1    latanoprost (XALATAN) 0.005 % ophthalmic solution, 1 drop daily at bedtime, Disp: , Rfl:     lisinopril (ZESTRIL) 20 mg tablet, Take 1 tablet (20 mg total) by mouth daily, Disp: 90 tablet, Rfl: 2    metFORMIN (GLUCOPHAGE) 1000 MG tablet, Take 1 tablet (1,000 mg total) by mouth 2 (two) times a day with meals, Disp: 60 tablet, Rfl: 5    methotrexate 2.5 mg tablet, 15 mg once a week, Disp: , Rfl:     Multiple Vitamins-Minerals (PRESERVISION AREDS PO), Take 1 tablet by mouth daily, Disp: , Rfl:     multivitamin (THERAGRAN) TABS, Take 1  "tablet by mouth daily, Disp: , Rfl:     mupirocin (BACTROBAN) 2 % ointment, DILUTE 1 INCH OF MUPIROCIN OINT IN 8 OZ. OF SALINE IRRIGATE EACH NOSTRIL WITH 4 OZ. TWICE A DAY, Disp: , Rfl:     NovoLOG Mix 70/30 FlexPen (70-30) 100 units/mL injection pen, INJECT 9 UNITS UNDER THE SKIN JUST PRIOR TO BREAKFAST AND JUST PRIOR TO DINNER, Disp: 15 mL, Rfl: 3    Omega-3 Fatty Acids (FISH OIL PO), Take 1 capsule by mouth daily, Disp: , Rfl:     OneTouch Ultra test strip, Check up to 3 times daily as needed before meals and at bedtime., Disp: 300 strip, Rfl: 2    riTUXimab (RITUXAN) injection, Infuse into a venous catheter , Disp: , Rfl:     rosuvastatin (CRESTOR) 10 MG tablet, Take 1 tablet (10 mg total) by mouth daily, Disp: 90 tablet, Rfl: 3    Vitamin D, Cholecalciferol, 1000 units CAPS, Take 1 tablet by mouth in the morning, Disp: , Rfl:     Continuous Blood Gluc Sensor (Dexcom G6 Sensor) MISC, Change sensor every 10 days (Patient not taking: Reported on 4/8/2024), Disp: 1 each, Rfl: 0    Droplet Pen Needles 31G X 8 MM MISC, INJECT UNDER THE SKIN 4 (FOUR) TIMES A DAY (Patient not taking: Reported on 4/8/2024), Disp: 400 each, Rfl: 1  Review of Systems   Constitutional:  Positive for unexpected weight change.   HENT:  Negative for voice change.    Eyes:  Negative for visual disturbance.   Gastrointestinal:  Negative for constipation and diarrhea.   Endocrine: Negative for polydipsia and polyuria.   Neurological:  Negative for numbness.   Psychiatric/Behavioral:  The patient is not nervous/anxious.        Physical Exam:  Body mass index is 24.65 kg/m².  /94   Pulse 90   Ht 5' 10\" (1.778 m)   Wt 77.9 kg (171 lb 12.8 oz)   SpO2 96%   BMI 24.65 kg/m²    Wt Readings from Last 3 Encounters:   04/08/24 77.9 kg (171 lb 12.8 oz)   02/20/24 75.3 kg (166 lb)   01/04/24 76.8 kg (169 lb 6.4 oz)            Physical Exam   Gen: appears well-developed and well-nourished. No apparent distress.   Head: Normocephalic and " atraumatic.   Eyes: no stare or proptosis, no periorbital edema  E/N/M nl facies, hearing grossly intact  Neck: range of motion nl.   Pulmonary/Chest: breathing  comfortably, no accessory muscle use, effort normal.   Musculoskeletal: moves all extremities, gait nl  Neurological: alert and oriented to person, place, and time. No upper ext tremor appreciated  Skin: does not appear diaphoretic, no facial plethora  Psychiatric: normal mood and affect; behavior is normal; no gross lapses in memory, answer questions appropriately            Labs:     Lab Results   Component Value Date    HGBA1C 7.7 (H) 03/16/2024         Lab Results   Component Value Date    CREATININE 0.87 11/06/2023    CREATININE 0.89 08/29/2023    CREATININE 1.10 08/03/2023    BUN 12 11/06/2023    K 4.6 11/06/2023    CL 95 (L) 11/06/2023    CO2 27 11/06/2023       Lab Results   Component Value Date    HDL 54 03/16/2024    TRIG 144 03/16/2024       Lab Results   Component Value Date    ALT 17 08/29/2023    AST 17 08/29/2023    ALKPHOS 86 08/29/2023           Impression:  1. Type 2 diabetes mellitus with hyperglycemia, with long-term current use of insulin ([facility])    2. Current use of insulin ([facility])             Plan:    Nixon was seen today for diabetes type 2.    Diagnoses and all orders for this visit:    Type 2 diabetes mellitus with hyperglycemia, with long-term current use of insulin ([facility])  -     Ambulatory referral to Diabetic Education; Future  -     Empagliflozin 25 MG TABS; Take 1 tablet (25 mg total) by mouth every morning  -     Fructosamine; Future  -     NovoLOG Mix 70/30 FlexPen (70-30) 100 units/mL injection pen; INJECT 9 UNITS UNDER THE SKIN JUST PRIOR TO BREAKFAST AND JUST PRIOR TO DINNER  -     metFORMIN (GLUCOPHAGE) 1000 MG tablet; Take 1 tablet (1,000 mg total) by mouth 2 (two) times a day with meals    Current use of insulin ([facility])  -     NovoLOG Mix 70/30 FlexPen (70-30) 100 units/mL injection pen; INJECT 9 UNITS UNDER THE SKIN JUST  PRIOR TO BREAKFAST AND JUST PRIOR TO DINNER           1. 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 He uses his Dexcom well, though his CGM tracing is well below the A1c. Fructoasamine and Time in Range likely better gauge of glycemic control. Will upgrade to G7    RTC in 4mos  Discussed with the patient and all questioned fully answered. He will call me if any problems arise.    Counseled patient on diagnostic results, prognosis, risk and benefit of treatment options, instruction for management, importance of treatment compliance, Risk  factor reduction and impressions      Val James MD

## 2024-04-10 ENCOUNTER — OFFICE VISIT (OUTPATIENT)
Dept: DIABETES SERVICES | Facility: CLINIC | Age: 59
End: 2024-04-10
Payer: COMMERCIAL

## 2024-04-10 DIAGNOSIS — Z79.4 TYPE 2 DIABETES MELLITUS WITH HYPERGLYCEMIA, WITH LONG-TERM CURRENT USE OF INSULIN (HCC): Primary | ICD-10-CM

## 2024-04-10 DIAGNOSIS — E11.65 TYPE 2 DIABETES MELLITUS WITH HYPERGLYCEMIA, WITH LONG-TERM CURRENT USE OF INSULIN (HCC): Primary | ICD-10-CM

## 2024-04-10 PROCEDURE — 95249 CONT GLUC MNTR PT PROV EQP: CPT | Performed by: DIETITIAN, REGISTERED

## 2024-04-10 NOTE — PROGRESS NOTES
Dexcom G7 Personal Training    Met with Nixon Bailey Jr. for Dexcom G7 personal training. Patient comes in today with there own unit to be trained on. Completed all aspects of training, including site selection on rotation, not infusing insulin near the sensor site, proper insertion technique, inserting codes into the , waterproof sensor, range of 20ft, setting high low alarms that can be adjusted based on their preferences. They put on their first sensor by themselves with no issue.  Left my office today with sensor on and in 30 min warm up mode.      Nixon Bailey Jr. will be running the dexcom through their phone.    Nixon is already using the Clarity t[place]on (was on G6 prior). They understand that their blood sugars are not monitored by us on a regular basis, but that we can access them as needed or desired by the patient and provider.     Dexcom's phone number is in their paperwork, encouraged Nixon to reach out to Dexcom if they have any issues after hours, 24/7. Training completed, will call with questions.     Lab Results   Component Value Date    HGBA1C 7.7 (H) 03/16/2024       Lab Results   Component Value Date    SODIUM 131 (L) 11/06/2023    K 4.6 11/06/2023    CL 95 (L) 11/06/2023    CO2 27 11/06/2023    AGAP 9 11/06/2023    BUN 12 11/06/2023    CREATININE 0.87 11/06/2023    GLUC 195 (H) 08/29/2023    GLUF 140 (H) 11/06/2023    CALCIUM 9.3 11/06/2023    AST 17 08/29/2023    ALT 17 08/29/2023    ALKPHOS 86 08/29/2023    TP 6.8 08/29/2023    TBILI 0.5 08/29/2023    EGFR 93 11/06/2023           Patient response to instruction    Comprehension: very good  Motivation: very good  Expected Compliance: very good  Response to Teachback: 100%, demonstrated understanding    Start- Stop: 8:10-8:45  Total Minutes: 35 Minutes  Group or Individual Instruction: DSMT-I  Other: Dr. James       Thank you for referring your patient to [facility] [place], it was a pleasure working with them today.  Please feel free to call with any questions or concerns.

## 2024-04-16 ENCOUNTER — 6 MONTH FOLLOW UP (OUTPATIENT)
Dept: URBAN - METROPOLITAN AREA CLINIC 6 | Facility: CLINIC | Age: 59
End: 2024-04-16

## 2024-04-16 DIAGNOSIS — H35.412: ICD-10-CM

## 2024-04-16 DIAGNOSIS — H40.1131: ICD-10-CM

## 2024-04-16 DIAGNOSIS — H04.123: ICD-10-CM

## 2024-04-16 DIAGNOSIS — Z98.890: ICD-10-CM

## 2024-04-16 DIAGNOSIS — H44.23: ICD-10-CM

## 2024-04-16 DIAGNOSIS — H02.053: ICD-10-CM

## 2024-04-16 DIAGNOSIS — H25.813: ICD-10-CM

## 2024-04-16 DIAGNOSIS — H43.813: ICD-10-CM

## 2024-04-16 DIAGNOSIS — Z79.4: ICD-10-CM

## 2024-04-16 DIAGNOSIS — E11.9: ICD-10-CM

## 2024-04-16 PROCEDURE — 92014 COMPRE OPH EXAM EST PT 1/>: CPT | Mod: 25

## 2024-04-16 PROCEDURE — 67820 REVISE EYELASHES: CPT

## 2024-04-16 ASSESSMENT — TONOMETRY
OD_IOP_MMHG: 8
OS_IOP_MMHG: 11
OS_IOP_MMHG: 10
OD_IOP_MMHG: 09

## 2024-04-16 ASSESSMENT — VISUAL ACUITY
OS_CC: 20/50
OD_CC: 20/400

## 2024-04-17 DIAGNOSIS — E11.65 TYPE 2 DIABETES MELLITUS WITH HYPERGLYCEMIA, WITH LONG-TERM CURRENT USE OF INSULIN (HCC): ICD-10-CM

## 2024-04-17 DIAGNOSIS — Z79.4 TYPE 2 DIABETES MELLITUS WITH HYPERGLYCEMIA, WITH LONG-TERM CURRENT USE OF INSULIN (HCC): ICD-10-CM

## 2024-04-17 RX ORDER — PEN NEEDLE, DIABETIC 31 GX5/16"
NEEDLE, DISPOSABLE MISCELLANEOUS 2 TIMES DAILY
Qty: 100 EACH | Refills: 3 | Status: SHIPPED | OUTPATIENT
Start: 2024-04-17

## 2024-05-28 ENCOUNTER — OFFICE VISIT (OUTPATIENT)
Dept: PODIATRY | Facility: CLINIC | Age: 59
End: 2024-05-28
Payer: COMMERCIAL

## 2024-05-28 VITALS
BODY MASS INDEX: 24.91 KG/M2 | HEIGHT: 70 IN | SYSTOLIC BLOOD PRESSURE: 143 MMHG | DIASTOLIC BLOOD PRESSURE: 92 MMHG | WEIGHT: 174 LBS | HEART RATE: 90 BPM

## 2024-05-28 DIAGNOSIS — Z79.4 TYPE 2 DIABETES MELLITUS WITH HYPERGLYCEMIA, WITH LONG-TERM CURRENT USE OF INSULIN (HCC): Primary | ICD-10-CM

## 2024-05-28 DIAGNOSIS — E11.65 TYPE 2 DIABETES MELLITUS WITH HYPERGLYCEMIA, WITH LONG-TERM CURRENT USE OF INSULIN (HCC): Primary | ICD-10-CM

## 2024-05-28 DIAGNOSIS — M21.42 PES PLANUS OF BOTH FEET: ICD-10-CM

## 2024-05-28 DIAGNOSIS — M20.42 HAMMER TOES OF BOTH FEET: ICD-10-CM

## 2024-05-28 DIAGNOSIS — M20.41 HAMMER TOES OF BOTH FEET: ICD-10-CM

## 2024-05-28 DIAGNOSIS — M21.41 PES PLANUS OF BOTH FEET: ICD-10-CM

## 2024-05-28 PROCEDURE — 99213 OFFICE O/P EST LOW 20 MIN: CPT | Performed by: PODIATRIST

## 2024-05-28 NOTE — LETTER
May 28, 2024     Jose Alvarenga DO  [address] [place] [id number]    Patient: Nixon Bailey Jr.   YOB: 1965   Date of Visit: 5/28/2024       Dear Dr. Alvarenga:    Thank you for referring Nixon Bailey to me for evaluation. Below are my notes for this consultation.    If you have questions, please do not hesitate to call me. I look forward to following your patient along with you.         Sincerely,        Alfa Knox DPM        CC: No Recipients    Alfa Knox DPM  5/28/2024  4:32 PM  Sign when Signing Visit        PATIENT:  Nixon Bailey Jr.    1965      ASSESSMENT:     1. Type 2 diabetes mellitus with hyperglycemia, with long-term current use of insulin (HCC)        2. Pes planus of both feet        3. Hammer toes of both feet            PLAN:  1.  Reviewed medical records.  Patient was counseled on the condition and diagnosis.    2.  Educated disease prevention and risks related to diabetes.    3.  Educated proper daily foot care and exam.  Instructed proper skin care / protection and footwear.  Instructed to identify any signs of infection and related foot problem.    4.  The recent blood work was reviewed and discussed.  The last HbA1c was 7.7.  Discussed proper blood glucose control with diet and exercise.    5.  Continue orthotics.  The patient will return in 1 year for diabetic foot exam.        Subjective:      HPI  The patient presents for diabetic foot exam.  BS is under control.  No significant numbness or paresthesia.  Denied weakness or significant functional deficit.  No acute pedal problems.      The following portions of the patient's history were reviewed and updated as appropriate: allergies, current medications, past family history, past medical history, past social history, past surgical history and problem list.  All pertinent labs and images were reviewed.    Past Medical History  Past Medical History:   Diagnosis Date   • 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HCC)    • Colon  polyp    • Diabetes mellitus (HCC)     blood sugar 121 @ 0600   • GERD (gastroesophageal reflux disease)    • IgG4 related disease    • Sarcoid        Past Surgical History  Past Surgical History:   Procedure Laterality Date   • COLONOSCOPY     • ESOPHAGOGASTRODUODENOSCOPY N/A 4/2/2019    Procedure: ESOPHAGOGASTRODUODENOSCOPY (EGD);  Surgeon: Trey Singleton MD;  Location: [facility] GI LAB;  Service: Gastroenterology   • LYMPH NODE BIOPSY     • LYMPH NODE DISSECTION  2009, left axilla    benign on pathology   • OPTIC NERVE DECOMPRESSION     • [place] ESOPHAGOGASTRODUODENOSCOPY TRANSORAL DIAGNOSTIC N/A 5/26/2017    Procedure: EGD AND COLONOSCOPY;  Surgeon: Trey Singleton MD;  Location:  GI LAB;  Service: Gastroenterology   • SINUS SURGERY     • SINUS SURGERY     • WISDOM TOOTH EXTRACTION          Allergies:  Cefaclor    Medications:  Current Outpatient Medications   Medication Sig Dispense Refill   • Ascorbic Acid (VITAMIN C) 1000 MG tablet Take 1,000 mg by mouth daily     • b complex vitamins tablet Take 1 tablet by mouth daily     • B-D ULTRAFINE III SHORT PEN 31G X 8 MM MISC USE 2 (TWO) TIMES A  each 3   • Continuous Blood Gluc Sensor (Dexcom G6 Sensor) MISC Change sensor every 10 days 1 each 0   • Continuous Blood Gluc Sensor (Dexcom G7 Sensor) Use 1 Device every 10 days 9 each 2   • Cyanocobalamin (VITAMIN B12 PO) Take 1 tablet by mouth daily     • Diclofenac Sodium (VOLTAREN) 1 % Apply 2 g topically 2 (two) times a day as needed (knee pain/OA) 1 Tube 0   • Empagliflozin 25 MG TABS Take 1 tablet (25 mg total) by mouth every morning 30 tablet 4   • famotidine (PEPCID) 40 MG tablet Take 1 tablet (40 mg total) by mouth 2 (two) times a day (Patient taking differently: Take 20 mg by mouth 2 (two) times a day) 180 tablet 3   • FLUTICASONE PROPIONATE, INHAL, IN Inhale 2 sprays 2 (two) times a day     • folic acid (FOLVITE) 1 mg tablet Take 1 mg by mouth daily     • glucose blood test strip onetouch     calvin ultra     •  latanoprost (XALATAN) 0.005 % ophthalmic solution 1 drop daily at bedtime     • lisinopril (ZESTRIL) 20 mg tablet Take 1 tablet (20 mg total) by mouth daily 90 tablet 2   • metFORMIN (GLUCOPHAGE) 1000 MG tablet Take 1 tablet (1,000 mg total) by mouth 2 (two) times a day with meals 60 tablet 5   • methotrexate 2.5 mg tablet 15 mg once a week     • Multiple Vitamins-Minerals (PRESERVISION AREDS PO) Take 1 tablet by mouth daily     • multivitamin (THERAGRAN) TABS Take 1 tablet by mouth daily     • mupirocin (BACTROBAN) 2 % ointment DILUTE 1 INCH OF MUPIROCIN OINT IN 8 OZ. OF SALINE IRRIGATE EACH NOSTRIL WITH 4 OZ. TWICE A DAY     • NovoLOG Mix 70/30 FlexPen (70-30) 100 units/mL injection pen INJECT 9 UNITS UNDER THE SKIN JUST PRIOR TO BREAKFAST AND JUST PRIOR TO DINNER (Patient taking differently: INJECT 9 UNITS UNDER THE SKIN JUST PRIOR TO BREAKFAST AND JUST PRIOR TO DINNER) 15 mL 3   • Omega-3 Fatty Acids (FISH OIL PO) Take 1 capsule by mouth daily     • OneTouch Ultra test strip Check up to 3 times daily as needed before meals and at bedtime. 300 strip 2   • riTUXimab (RITUXAN) injection Infuse into a venous catheter      • rosuvastatin (CRESTOR) 10 MG tablet Take 1 tablet (10 mg total) by mouth daily 90 tablet 3   • Vitamin D, Cholecalciferol, 1000 units CAPS Take 1 tablet by mouth in the morning     • Droplet Pen Needles 31G X 8 MM MISC INJECT UNDER THE SKIN 4 (FOUR) TIMES A DAY (Patient not taking: Reported on 4/8/2024) 400 each 1     No current facility-administered medications for this visit.       Social History:  Social History     Socioeconomic History   • Marital status: /Civil Union     Spouse name: None   • Number of children: None   • Years of education: None   • Highest education level: None   Occupational History   • Occupation:    Tobacco Use   • Smoking status: Former     Current packs/day: 0.00     Average packs/day: 1 pack/day for 25.0 years (25.0 ttl pk-yrs)     Types:  "Cigarettes     Start date: 1991     Quit date: 2016     Years since quittin.1   • Smokeless tobacco: Never   Vaping Use   • Vaping status: Never Used   Substance and Sexual Activity   • Alcohol use: Not Currently     Comment: rarely   • Drug use: Never   • Sexual activity: Yes     Partners: Female     Birth control/protection: None   Other Topics Concern   • None   Social History Narrative   • None     Social Determinants of Health     Financial Resource Strain: Not on file   Food Insecurity: Not on file   Transportation Needs: Not on file   Physical Activity: Not on file   Stress: Not on file   Social Connections: Not on file   Intimate Partner Violence: Not on file   Housing Stability: Not on file        Review of Systems   Constitutional:  Negative for appetite change, chills and fever.   Respiratory: Negative.     Cardiovascular: Negative.    Gastrointestinal: Negative.    Musculoskeletal:  Negative for gait problem.   Skin:  Negative for wound.   Neurological:  Negative for weakness and numbness.         Objective:      /92 Comment: unable to take  Pulse 90   Ht 5' 10\" (1.778 m)   Wt 78.9 kg (174 lb)   BMI 24.97 kg/m²          Physical Exam  Vitals reviewed.   Constitutional:       General: He is not in acute distress.     Appearance: Normal appearance. He is not toxic-appearing.   Cardiovascular:      Rate and Rhythm: Normal rate and regular rhythm.      Pulses: no weak pulses.           Dorsalis pedis pulses are 2+ on the right side and 2+ on the left side.        Posterior tibial pulses are 2+ on the right side and 2+ on the left side.   Pulmonary:      Effort: Pulmonary effort is normal. No respiratory distress.   Musculoskeletal:         General: Deformity present. No swelling, tenderness or signs of injury.      Right lower leg: No edema.      Left lower leg: No edema.      Right foot: Deformity present. No Charcot foot or foot drop.      Left foot: Deformity present. No Charcot " foot or foot drop.      Comments: Mild pes planus presents.  Flexible hammertoe presents.  Mild hypertrophy of TMTJ.   Feet:      Right foot:      Protective Sensation: 10 sites tested.  10 sites sensed.      Skin integrity: No ulcer, skin breakdown, erythema, warmth, callus or dry skin.      Left foot:      Protective Sensation: 10 sites tested.  10 sites sensed.      Skin integrity: No ulcer, skin breakdown, erythema, warmth, callus or dry skin.   Skin:     General: Skin is warm.      Capillary Refill: Capillary refill takes less than 2 seconds.      Coloration: Skin is not cyanotic or mottled.      Findings: No abscess, ecchymosis, erythema, rash or wound.      Nails: There is no clubbing.   Neurological:      General: No focal deficit present.      Mental Status: He is alert and oriented to person, place, and time.      Cranial Nerves: No cranial nerve deficit.      Sensory: No sensory deficit.      Motor: No weakness.      Coordination: Coordination normal.      Gait: Gait normal.      Deep Tendon Reflexes: Reflexes normal.   Psychiatric:         Mood and Affect: Mood normal.         Behavior: Behavior normal.         Thought Content: Thought content normal.         Judgment: Judgment normal.           Diabetic Foot Exam    Patient's shoes and socks removed.    Right Foot/Ankle   Right Foot Inspection  Skin Exam: skin normal and skin intact. No dry skin, no warmth, no callus, no erythema, no maceration, no abnormal color, no pre-ulcer, no ulcer and no callus.     Toe Exam: right toe deformity. No swelling, no tenderness and erythema    Sensory   Vibration: intact  Proprioception: intact  Monofilament testing: intact    Vascular  Capillary refills: < 3 seconds  The right DP pulse is 2+. The right PT pulse is 2+.     Left Foot/Ankle  Left Foot Inspection  Skin Exam: skin normal and skin intact. No dry skin, no warmth, no erythema, no maceration, normal color, no pre-ulcer, no ulcer and no callus.     Toe Exam:  left toe deformity. No swelling, no tenderness and no erythema.     Sensory   Vibration: intact  Proprioception: intact  Monofilament testing: intact    Vascular  Capillary refills: < 3 seconds  The left DP pulse is 2+. The left PT pulse is 2+.     Assign Risk Category  Deformity present  No loss of protective sensation  No weak pulses  Risk: 0

## 2024-05-28 NOTE — PROGRESS NOTES
PATIENT:  Nixon Bailey Jr.    1965      ASSESSMENT:     1. Type 2 diabetes mellitus with hyperglycemia, with long-term current use of insulin (HCC)        2. Pes planus of both feet        3. Hammer toes of both feet            PLAN:  1.  Reviewed medical records.  Patient was counseled on the condition and diagnosis.    2.  Educated disease prevention and risks related to diabetes.    3.  Educated proper daily foot care and exam.  Instructed proper skin care / protection and footwear.  Instructed to identify any signs of infection and related foot problem.    4.  The recent blood work was reviewed and discussed.  The last HbA1c was 7.7.  Discussed proper blood glucose control with diet and exercise.    5.  Continue orthotics.  The patient will return in 1 year for diabetic foot exam.        Subjective:      HPI  The patient presents for diabetic foot exam.  BS is under control.  No significant numbness or paresthesia.  Denied weakness or significant functional deficit.  No acute pedal problems.      The following portions of the patient's history were reviewed and updated as appropriate: allergies, current medications, past family history, past medical history, past social history, past surgical history and problem list.  All pertinent labs and images were reviewed.    Past Medical History  Past Medical History:   Diagnosis Date   • 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HCC)    • Colon polyp    • Diabetes mellitus (HCC)     blood sugar 121 @ 0600   • GERD (gastroesophageal reflux disease)    • IgG4 related disease    • Sarcoid        Past Surgical History  Past Surgical History:   Procedure Laterality Date   • COLONOSCOPY     • ESOPHAGOGASTRODUODENOSCOPY N/A 4/2/2019    Procedure: ESOPHAGOGASTRODUODENOSCOPY (EGD);  Surgeon: Trey Singleton MD;  Location: [facility];  Service: Gastroenterology   • LYMPH NODE BIOPSY     • LYMPH NODE DISSECTION  2009, left axilla    benign on pathology   • OPTIC NERVE DECOMPRESSION      • [place] ESOPHAGOGASTRODUODENOSCOPY TRANSORAL DIAGNOSTIC N/A 5/26/2017    Procedure: EGD AND COLONOSCOPY;  Surgeon: Trey Singleotn MD;  Location: [facility];  Service: Gastroenterology   • SINUS SURGERY     • SINUS SURGERY     • WISDOM TOOTH EXTRACTION          Allergies:  Cefaclor    Medications:  Current Outpatient Medications   Medication Sig Dispense Refill   • Ascorbic Acid (VITAMIN C) 1000 MG tablet Take 1,000 mg by mouth daily     • b complex vitamins tablet Take 1 tablet by mouth daily     • B-D ULTRAFINE III SHORT PEN 31G X 8 MM MISC USE 2 (TWO) TIMES A  each 3   • Continuous Blood Gluc Sensor (Dexcom G6 Sensor) MISC Change sensor every 10 days 1 each 0   • Continuous Blood Gluc Sensor (Dexcom G7 Sensor) Use 1 Device every 10 days 9 each 2   • Cyanocobalamin (VITAMIN B12 PO) Take 1 tablet by mouth daily     • Diclofenac Sodium (VOLTAREN) 1 % Apply 2 g topically 2 (two) times a day as needed (knee pain/OA) 1 Tube 0   • Empagliflozin 25 MG TABS Take 1 tablet (25 mg total) by mouth every morning 30 tablet 4   • famotidine (PEPCID) 40 MG tablet Take 1 tablet (40 mg total) by mouth 2 (two) times a day (Patient taking differently: Take 20 mg by mouth 2 (two) times a day) 180 tablet 3   • FLUTICASONE PROPIONATE, INHAL, IN Inhale 2 sprays 2 (two) times a day     • folic acid (FOLVITE) 1 mg tablet Take 1 mg by mouth daily     • glucose blood test strip onetouch     calvin ultra     • latanoprost (XALATAN) 0.005 % ophthalmic solution 1 drop daily at bedtime     • lisinopril (ZESTRIL) 20 mg tablet Take 1 tablet (20 mg total) by mouth daily 90 tablet 2   • metFORMIN (GLUCOPHAGE) 1000 MG tablet Take 1 tablet (1,000 mg total) by mouth 2 (two) times a day with meals 60 tablet 5   • methotrexate 2.5 mg tablet 15 mg once a week     • Multiple Vitamins-Minerals (PRESERVISION AREDS PO) Take 1 tablet by mouth daily     • multivitamin (THERAGRAN) TABS Take 1 tablet by mouth daily     • mupirocin (BACTROBAN) 2 % ointment DILUTE  1 INCH OF MUPIROCIN OINT IN 8 OZ. OF SALINE IRRIGATE EACH NOSTRIL WITH 4 OZ. TWICE A DAY     • NovoLOG Mix 70/30 FlexPen (70-30) 100 units/mL injection pen INJECT 9 UNITS UNDER THE SKIN JUST PRIOR TO BREAKFAST AND JUST PRIOR TO DINNER (Patient taking differently: INJECT 9 UNITS UNDER THE SKIN JUST PRIOR TO BREAKFAST AND JUST PRIOR TO DINNER) 15 mL 3   • Omega-3 Fatty Acids (FISH OIL PO) Take 1 capsule by mouth daily     • OneTouch Ultra test strip Check up to 3 times daily as needed before meals and at bedtime. 300 strip 2   • riTUXimab (RITUXAN) injection Infuse into a venous catheter      • rosuvastatin (CRESTOR) 10 MG tablet Take 1 tablet (10 mg total) by mouth daily 90 tablet 3   • Vitamin D, Cholecalciferol, 1000 units CAPS Take 1 tablet by mouth in the morning     • Droplet Pen Needles 31G X 8 MM MISC INJECT UNDER THE SKIN 4 (FOUR) TIMES A DAY (Patient not taking: Reported on 2024) 400 each 1     No current facility-administered medications for this visit.       Social History:  Social History     Socioeconomic History   • Marital status: /Civil Union     Spouse name: None   • Number of children: None   • Years of education: None   • Highest education level: None   Occupational History   • Occupation:    Tobacco Use   • Smoking status: Former     Current packs/day: 0.00     Average packs/day: 1 pack/day for 25.0 years (25.0 ttl pk-yrs)     Types: Cigarettes     Start date: 1991     Quit date: 2016     Years since quittin.1   • Smokeless tobacco: Never   Vaping Use   • Vaping status: Never Used   Substance and Sexual Activity   • Alcohol use: Not Currently     Comment: rarely   • Drug use: Never   • Sexual activity: Yes     Partners: Female     Birth control/protection: None   Other Topics Concern   • None   Social History Narrative   • None     Social Determinants of Health     Financial Resource Strain: Not on file   Food Insecurity: Not on file   Transportation  "Needs: Not on file   Physical Activity: Not on file   Stress: Not on file   Social Connections: Not on file   Intimate Partner Violence: Not on file   Housing Stability: Not on file        Review of Systems   Constitutional:  Negative for appetite change, chills and fever.   Respiratory: Negative.     Cardiovascular: Negative.    Gastrointestinal: Negative.    Musculoskeletal:  Negative for gait problem.   Skin:  Negative for wound.   Neurological:  Negative for weakness and numbness.         Objective:      /92 Comment: unable to take  Pulse 90   Ht 5' 10\" (1.778 m)   Wt 78.9 kg (174 lb)   BMI 24.97 kg/m²          Physical Exam  Vitals reviewed.   Constitutional:       General: He is not in acute distress.     Appearance: Normal appearance. He is not toxic-appearing.   Cardiovascular:      Rate and Rhythm: Normal rate and regular rhythm.      Pulses: no weak pulses.           Dorsalis pedis pulses are 2+ on the right side and 2+ on the left side.        Posterior tibial pulses are 2+ on the right side and 2+ on the left side.   Pulmonary:      Effort: Pulmonary effort is normal. No respiratory distress.   Musculoskeletal:         General: Deformity present. No swelling, tenderness or signs of injury.      Right lower leg: No edema.      Left lower leg: No edema.      Right foot: Deformity present. No Charcot foot or foot drop.      Left foot: Deformity present. No Charcot foot or foot drop.      Comments: Mild pes planus presents.  Flexible hammertoe presents.  Mild hypertrophy of TMTJ.   Feet:      Right foot:      Protective Sensation: 10 sites tested.  10 sites sensed.      Skin integrity: No ulcer, skin breakdown, erythema, warmth, callus or dry skin.      Left foot:      Protective Sensation: 10 sites tested.  10 sites sensed.      Skin integrity: No ulcer, skin breakdown, erythema, warmth, callus or dry skin.   Skin:     General: Skin is warm.      Capillary Refill: Capillary refill takes less than " 2 seconds.      Coloration: Skin is not cyanotic or mottled.      Findings: No abscess, ecchymosis, erythema, rash or wound.      Nails: There is no clubbing.   Neurological:      General: No focal deficit present.      Mental Status: He is alert and oriented to person, place, and time.      Cranial Nerves: No cranial nerve deficit.      Sensory: No sensory deficit.      Motor: No weakness.      Coordination: Coordination normal.      Gait: Gait normal.      Deep Tendon Reflexes: Reflexes normal.   Psychiatric:         Mood and Affect: Mood normal.         Behavior: Behavior normal.         Thought Content: Thought content normal.         Judgment: Judgment normal.           Diabetic Foot Exam    Patient's shoes and socks removed.    Right Foot/Ankle   Right Foot Inspection  Skin Exam: skin normal and skin intact. No dry skin, no warmth, no callus, no erythema, no maceration, no abnormal color, no pre-ulcer, no ulcer and no callus.     Toe Exam: right toe deformity. No swelling, no tenderness and erythema    Sensory   Vibration: intact  Proprioception: intact  Monofilament testing: intact    Vascular  Capillary refills: < 3 seconds  The right DP pulse is 2+. The right PT pulse is 2+.     Left Foot/Ankle  Left Foot Inspection  Skin Exam: skin normal and skin intact. No dry skin, no warmth, no erythema, no maceration, normal color, no pre-ulcer, no ulcer and no callus.     Toe Exam: left toe deformity. No swelling, no tenderness and no erythema.     Sensory   Vibration: intact  Proprioception: intact  Monofilament testing: intact    Vascular  Capillary refills: < 3 seconds  The left DP pulse is 2+. The left PT pulse is 2+.     Assign Risk Category  Deformity present  No loss of protective sensation  No weak pulses  Risk: 0

## 2024-06-19 DIAGNOSIS — I10 ESSENTIAL HYPERTENSION: ICD-10-CM

## 2024-06-19 RX ORDER — LISINOPRIL 20 MG/1
20 TABLET ORAL DAILY
Qty: 90 TABLET | Refills: 1 | Status: SHIPPED | OUTPATIENT
Start: 2024-06-19 | End: 2024-06-24 | Stop reason: SDUPTHER

## 2024-06-24 DIAGNOSIS — I10 ESSENTIAL HYPERTENSION: ICD-10-CM

## 2024-06-24 RX ORDER — LISINOPRIL 20 MG/1
20 TABLET ORAL DAILY
Qty: 90 TABLET | Refills: 1 | Status: SHIPPED | OUTPATIENT
Start: 2024-06-24

## 2024-06-24 NOTE — TELEPHONE ENCOUNTER
Medication: lisinopril    Dose/Frequency: 20 mg tablet    Quantity: 90 tablets    Pharmacy: [facility]     Office:   [x] PCP/Provider -   [] Speciality/Provider -     Does the patient have enough for 3 days?   [x] Yes   [] No - Send as HP to POD

## 2024-07-02 ENCOUNTER — OFFICE VISIT (OUTPATIENT)
Dept: INTERNAL MEDICINE CLINIC | Facility: CLINIC | Age: 59
End: 2024-07-02
Payer: COMMERCIAL

## 2024-07-02 VITALS
SYSTOLIC BLOOD PRESSURE: 146 MMHG | HEART RATE: 79 BPM | TEMPERATURE: 97.3 F | WEIGHT: 175.4 LBS | BODY MASS INDEX: 25.11 KG/M2 | OXYGEN SATURATION: 98 % | RESPIRATION RATE: 16 BRPM | HEIGHT: 70 IN | DIASTOLIC BLOOD PRESSURE: 81 MMHG

## 2024-07-02 DIAGNOSIS — I10 ESSENTIAL HYPERTENSION: ICD-10-CM

## 2024-07-02 DIAGNOSIS — Z79.4 TYPE 2 DIABETES MELLITUS WITH HYPERGLYCEMIA, WITH LONG-TERM CURRENT USE OF INSULIN (HCC): ICD-10-CM

## 2024-07-02 DIAGNOSIS — E11.65 TYPE 2 DIABETES MELLITUS WITH HYPERGLYCEMIA, WITH LONG-TERM CURRENT USE OF INSULIN (HCC): ICD-10-CM

## 2024-07-02 DIAGNOSIS — E78.2 MIXED HYPERLIPIDEMIA: ICD-10-CM

## 2024-07-02 DIAGNOSIS — Z12.5 SCREENING FOR PROSTATE CANCER: ICD-10-CM

## 2024-07-02 DIAGNOSIS — D89.84 IGG4 RELATED DISEASE: ICD-10-CM

## 2024-07-02 DIAGNOSIS — J01.80 ACUTE NON-RECURRENT SINUSITIS OF OTHER SINUS: ICD-10-CM

## 2024-07-02 DIAGNOSIS — Z00.00 WELLNESS EXAMINATION: Primary | ICD-10-CM

## 2024-07-02 PROCEDURE — 99396 PREV VISIT EST AGE 40-64: CPT | Performed by: INTERNAL MEDICINE

## 2024-07-02 RX ORDER — AMOXICILLIN 875 MG/1
875 TABLET, COATED ORAL 2 TIMES DAILY
Qty: 20 TABLET | Refills: 0 | Status: SHIPPED | OUTPATIENT
Start: 2024-07-02 | End: 2024-07-12

## 2024-07-02 RX ORDER — INSULIN ASPART 100 [IU]/ML
INJECTION, SUSPENSION SUBCUTANEOUS
COMMUNITY

## 2024-07-02 NOTE — PATIENT INSTRUCTIONS
"Amoxicillin for sinus infection  PSA blood test    Patient Education     Lung cancer screening   The Basics   Written by the doctors and editors at [facility]   What is lung cancer screening? -- Lung cancer screening is a way in which doctors check the lungs for early signs of cancer in people who have no symptoms of lung cancer. The goal of screening is to find lung cancer early, before it has a chance to grow, spread, or cause problems.  Doctors suggest screening for certain people who are at high risk of lung cancer because they smoke, or used to smoke. Although screening is not likely to be helpful for everyone who smokes, doctors do think it might help prevent cancer deaths in people who smoke a lot or smoked for many years (even if they have already quit).  Researchers have studied chest X-rays and \"low-dose CT scans,\" 2 types of imaging tests, to see if they are good screening tools. A low-dose CT scan uses much less radiation than a typical CT scan and shows a more detailed image of the lungs than a standard X-ray. Chest X-rays do not work for screening for lung cancer. But low-dose CT scans are helpful screening tools for some people at high risk of lung cancer.  The best way to lower your chances of getting or dying from lung cancer is to quit smoking. No matter how much or how long you have smoked, quitting is a good idea. Quitting now will reduce your chances not only of lung problems, but also of heart disease and many forms of cancer.  Can I be screened with a standard X-ray instead of a low-dose CT scan? -- There is no proof that a standard X-ray for screening helps extend life. Experts do not recommend chest X-rays as a way to screen for lung cancer.  Who should be screened for lung cancer? -- For some people with a heavy or long smoking history, screening can save lives. If the following 3 statements are all true for you, ask your doctor about screening:   You are 50 to 80 years old.   You have smoked " "an amount that is equal to at least 1 pack a day for 20 years (for example, 2 packs a day for 10 years).   You still smoke now or quit smoking in the past 15 years.  In addition to your smoking history, the decision to be screened should also consider these things:   Your general health - Think about whether your health is good enough to get treatment if screening shows that you do have cancer. Your doctor can help you answer this. If your overall health is poor, lung cancer screening might not be recommended.   The costs involved in screening - If you are thinking about screening, check with your insurance company to find out if they will cover some or all of the cost. You can also ask your doctor's office what you might have to pay. If you have Medicare, they will cover lung cancer screening if you are 50 to 77 years old. It's important to be aware of this age limit if you are older than 77.  What are the benefits of being screened for lung cancer? -- The main benefit of screening is that it helps doctors find cancer early, when it is usually easier to treat. This might lower your chances of dying of lung cancer.  What are the possible drawbacks to being screened? -- The drawbacks include:   False positives - Low-dose CT scans can sometimes show a \"false positive,\" meaning that it suggests a person might have cancer when they do not. This can lead to unneeded worry and to more tests. For example, people who have a false positive might need to have a follow-up full-dose CT scan, which exposes them to more radiation. They sometimes also need a lung biopsy, which is a procedure to remove a small sample of lung tissue. This procedure can be painful and can sometimes lead to problems, such as bleeding or a collapsed lung.   Radiation exposure - Like all X-rays, CT scans expose you to some radiation. Although the radiation dose from a screening CT scan is low, you would need to have a scan every year for continued " screening.  What happens during a low-dose CT scan? -- When you have a low-dose CT scan, you lie on a sliding table. The CT scan machine is shaped like a giant donut, and you slide through the large hole in the center. As you slide through, the machine takes pictures of the inside of your body. The process only takes a few minutes and is painless.  What happens after a low-dose CT scan? -- After a low-dose CT scan, you should get a phone call or letter with your results. If you do not hear back about your results in about 2 weeks, call your doctor or nurse's office. Do not assume that your scan was normal if you hear nothing.  What if my CT scan is abnormal? -- If your scan is abnormal, don't panic. More than 95 out of 100 people with an abnormal scan turn out not to have lung cancer. But you will need more tests to find out whether you actually have cancer.  How often should I have a low-dose CT scan for screening? -- If you and your doctor decide that screening is right for you, you will need to have a low-dose CT scan once a year if your scans continue to be normal. You can stop getting screened at age 80 or once you have gone 15 years or longer without smoking. However, remember that Medicare does not cover screening for people older than 77 years.  All topics are updated as new evidence becomes available and our peer review process is complete.  This topic retrieved from [employer] on: Feb 26, 2024.  Topic 62386 Version 14.0  Release: 32.2.4 - C32.56  © 2024 UpToDate, Inc. and/or its affiliates. All rights reserved.  Consumer Information Use and Disclaimer   Disclaimer: This generalized information is a limited summary of diagnosis, treatment, and/or medication information. It is not meant to be comprehensive and should be used as a tool to help the user understand and/or assess potential diagnostic and treatment options. It does NOT include all information about conditions, treatments, medications, side effects,  or risks that may apply to a specific patient. It is not intended to be medical advice or a substitute for the medical advice, diagnosis, or treatment of a health care provider based on the health care provider's examination and assessment of a patient's specific and unique circumstances. Patients must speak with a health care provider for complete information about their health, medical questions, and treatment options, including any risks or benefits regarding use of medications. This information does not endorse any treatments or medications as safe, effective, or approved for treating a specific patient. UpToDate, Inc. and its affiliates disclaim any warranty or liability relating to this information or the use thereof.The use of this information is governed by the Terms of Use, available at https://www.woltersBoyaa Interactiveuwer.com/en/know/clinical-effectiveness-terms. 2024© UpToDate, Inc. and its affiliates and/or licensors. All rights reserved.  Copyright   © 2024 UpToDate, Inc. and/or its affiliates. All rights reserved.

## 2024-07-02 NOTE — PROGRESS NOTES
Adult Annual Physical  Name: Nixon Bailey Jr.      : 1965      MRN: [id number]  Encounter Provider: Jose Alvarenga DO  Encounter Date: 2024   Encounter department: [facility]    Assessment & Plan   1. Wellness examination  2. Acute non-recurrent sinusitis of other sinus  Comments:  he gets 1-2 sinus infections per year.  amoxicillin ordered.  c/w OTC remedies/hydration.  if becomes 3-4 infections/year -> ENT eval  Orders:  -     amoxicillin (AMOXIL) 875 mg tablet; Take 1 tablet (875 mg total) by mouth 2 (two) times a day for 10 days  3. Type 2 diabetes mellitus with hyperglycemia, with long-term current use of insulin (HCC)  Comments:  taking insulin, jardiance and metformin and seeing endocrine for ongoing care  4. Mixed hyperlipidemia  Comments:  taking statin and no SE, c/w rx  5. Essential hypertension  Comments:  taking ACEI and BP stable, c/w rx  6. IgG4 related disease  Comments:  takes methotrexate and rituximab and seeing Rheum for ongoing care  7. Screening for prostate cancer  -     PSA, Total Screen; Future; Expected date: 2024    Immunizations and preventive care screenings were discussed with patient today. Appropriate education was printed on patient's after visit summary.    Discussed risks and benefits of prostate cancer screening. We discussed the controversial history of PSA screening for prostate cancer in the [place] as well as the risk of over detection and over treatment of prostate cancer by way of PSA screening.  The patient understands that PSA blood testing is an imperfect way to screen for prostate cancer and that elevated PSA levels in the blood may also be caused by infection, inflammation, prostatic trauma or manipulation, urological procedures, or by benign prostatic enlargement.    I discussed with him that he is a candidate for lung cancer CT screening.     The following Shared Decision-Making points were covered:  Benefits  of screening were discussed, including the rates of reduction in death from lung cancer and other causes.  Harms of screening were reviewed, including false positive tests, radiation exposure levels, risks of invasive procedures, risks of complications of screening, and risk of overdiagnosis.  I counseled on the importance of adherence to annual lung cancer LDCT screening, impact of co-morbidities, and ability or willingness to undergo diagnosis and treatment.  I counseled on the importance of maintaining abstinence as a former smoker or was counseled on the importance of smoking cessation if a current smoker    Review of Eligibility Criteria: He meets all of the criteria for Lung Cancer Screening.   He is 58 y.o.   He has 20 pack year tobacco history and is a current smoker or has quit within the past 15 years  He presents no signs or symptoms of lung cancer    After discussion, the patient decided to think about lung cancer screening and call back when he is ready to complete LDCt    Counseling:  Exercise: the importance of regular exercise/physical activity was discussed. Recommend exercise 3-5 times per week for at least 30 minutes.       Depression Screening and Follow-up Plan: Patient was screened for depression during today's encounter. They screened negative with a PHQ-2 score of 0.        History of Present Illness     Adult Annual Physical:  Patient presents for annual physical.     Diet and Physical Activity:  - Diet/Nutrition: diabetic diet.  - Exercise: 3-4 times a week on average.    Depression Screening:  - PHQ-2 Score: 0    General Health:  - Sleep: sleeps well.  - Hearing: normal hearing bilateral ears.  - Vision: wears glasses.  - Dental: regular dental visits.     Health:    - Urinary symptoms: none.     Here for annual physical, Nixon is overall stable.  He is seeing rheumatology for IgG4 RD and his jardiance dose was increased by endocrine.  He gets rituximab infusions every 6 mos for IgG4 RD.   "He is having a sinus infection over the last 10 or more days with runny nose, mucopurulent discharge, face pain/pressure and coughing/feeling unwell.  No fever, chills.  He is taking OTC remedies with limited benefit.  ROS Otherwise negative, no other complaints.    Review of Systems   Constitutional:  Negative for chills and fever.   HENT:  Positive for congestion, postnasal drip, rhinorrhea, sinus pressure and sinus pain.    Eyes:  Negative for visual disturbance.   Respiratory:  Positive for cough. Negative for shortness of breath.    Cardiovascular:  Negative for chest pain.   Gastrointestinal:  Negative for abdominal pain.   Endocrine: Negative for polyuria.   Genitourinary:  Negative for difficulty urinating.   Musculoskeletal:  Negative for gait problem.   Skin:  Negative for rash.   Allergic/Immunologic: Positive for immunocompromised state.   Neurological:  Negative for dizziness.   Psychiatric/Behavioral:  Negative for dysphoric mood.          Objective     /81 (BP Location: Left arm, Patient Position: Sitting, Cuff Size: Adult)   Pulse 79   Temp (!) 97.3 °F (36.3 °C) (Temporal)   Resp 16   Ht 5' 10\" (1.778 m)   Wt 79.6 kg (175 lb 6.4 oz)   SpO2 98%   BMI 25.17 kg/m²     Physical Exam  Vitals reviewed.   Constitutional:       General: He is not in acute distress.     Appearance: Normal appearance.   HENT:      Head: Normocephalic and atraumatic.      Right Ear: Tympanic membrane normal.      Left Ear: Tympanic membrane normal.      Nose: Congestion present.      Right Sinus: Maxillary sinus tenderness and frontal sinus tenderness present.      Left Sinus: Maxillary sinus tenderness and frontal sinus tenderness present.      Mouth/Throat:      Pharynx: No oropharyngeal exudate.   Eyes:      Conjunctiva/sclera: Conjunctivae normal.   Cardiovascular:      Rate and Rhythm: Normal rate and regular rhythm.      Heart sounds: No murmur heard.  Pulmonary:      Effort: Pulmonary effort is normal.      " Breath sounds: No wheezing or rales.   Abdominal:      General: Abdomen is flat. Bowel sounds are normal.      Palpations: Abdomen is soft.      Tenderness: There is no abdominal tenderness.   Musculoskeletal:      Right lower leg: No edema.      Left lower leg: No edema.   Lymphadenopathy:      Cervical: No cervical adenopathy.   Neurological:      Mental Status: He is alert. Mental status is at baseline.   Psychiatric:         Mood and Affect: Mood normal.         Behavior: Behavior normal.       Administrative Statements

## 2024-07-29 ENCOUNTER — LAB (OUTPATIENT)
Dept: LAB | Facility: CLINIC | Age: 59
End: 2024-07-29
Payer: COMMERCIAL

## 2024-07-29 ENCOUNTER — OFFICE VISIT (OUTPATIENT)
Dept: GASTROENTEROLOGY | Facility: CLINIC | Age: 59
End: 2024-07-29
Payer: COMMERCIAL

## 2024-07-29 VITALS
WEIGHT: 174 LBS | BODY MASS INDEX: 24.91 KG/M2 | HEIGHT: 70 IN | TEMPERATURE: 97.9 F | SYSTOLIC BLOOD PRESSURE: 118 MMHG | DIASTOLIC BLOOD PRESSURE: 64 MMHG

## 2024-07-29 DIAGNOSIS — Z12.5 SCREENING FOR PROSTATE CANCER: ICD-10-CM

## 2024-07-29 DIAGNOSIS — Z79.4 TYPE 2 DIABETES MELLITUS WITH HYPERGLYCEMIA, WITH LONG-TERM CURRENT USE OF INSULIN (HCC): ICD-10-CM

## 2024-07-29 DIAGNOSIS — K85.00 IDIOPATHIC ACUTE PANCREATITIS WITHOUT INFECTION OR NECROSIS: ICD-10-CM

## 2024-07-29 DIAGNOSIS — D89.84 IGG4 RELATED DISEASE: Primary | ICD-10-CM

## 2024-07-29 DIAGNOSIS — R10.13 EPIGASTRIC PAIN: ICD-10-CM

## 2024-07-29 DIAGNOSIS — E11.65 TYPE 2 DIABETES MELLITUS WITH HYPERGLYCEMIA, WITH LONG-TERM CURRENT USE OF INSULIN (HCC): ICD-10-CM

## 2024-07-29 LAB
ALBUMIN SERPL BCG-MCNC: 4.6 G/DL (ref 3.5–5)
ALP SERPL-CCNC: 70 U/L (ref 34–104)
ALT SERPL W P-5'-P-CCNC: 23 U/L (ref 7–52)
AST SERPL W P-5'-P-CCNC: 18 U/L (ref 13–39)
BASOPHILS # BLD AUTO: 0.06 THOUSANDS/ÂΜL (ref 0–0.1)
BASOPHILS NFR BLD AUTO: 1 % (ref 0–1)
BILIRUB DIRECT SERPL-MCNC: 0.12 MG/DL (ref 0–0.2)
BILIRUB SERPL-MCNC: 0.55 MG/DL (ref 0.2–1)
EOSINOPHIL # BLD AUTO: 0.16 THOUSAND/ÂΜL (ref 0–0.61)
EOSINOPHIL NFR BLD AUTO: 3 % (ref 0–6)
ERYTHROCYTE [DISTWIDTH] IN BLOOD BY AUTOMATED COUNT: 14.5 % (ref 11.6–15.1)
HCT VFR BLD AUTO: 48.1 % (ref 36.5–49.3)
HGB BLD-MCNC: 16.1 G/DL (ref 12–17)
IMM GRANULOCYTES # BLD AUTO: 0.02 THOUSAND/UL (ref 0–0.2)
IMM GRANULOCYTES NFR BLD AUTO: 0 % (ref 0–2)
LIPASE SERPL-CCNC: 11 U/L (ref 11–82)
LYMPHOCYTES # BLD AUTO: 0.9 THOUSANDS/ÂΜL (ref 0.6–4.47)
LYMPHOCYTES NFR BLD AUTO: 14 % (ref 14–44)
MCH RBC QN AUTO: 31.6 PG (ref 26.8–34.3)
MCHC RBC AUTO-ENTMCNC: 33.5 G/DL (ref 31.4–37.4)
MCV RBC AUTO: 94 FL (ref 82–98)
MONOCYTES # BLD AUTO: 1.19 THOUSAND/ÂΜL (ref 0.17–1.22)
MONOCYTES NFR BLD AUTO: 19 % (ref 4–12)
NEUTROPHILS # BLD AUTO: 3.97 THOUSANDS/ÂΜL (ref 1.85–7.62)
NEUTS SEG NFR BLD AUTO: 63 % (ref 43–75)
NRBC BLD AUTO-RTO: 0 /100 WBCS
PLATELET # BLD AUTO: 299 THOUSANDS/UL (ref 149–390)
PMV BLD AUTO: 8.5 FL (ref 8.9–12.7)
PROT SERPL-MCNC: 7 G/DL (ref 6.4–8.4)
PSA SERPL-MCNC: 0.89 NG/ML (ref 0–4)
RBC # BLD AUTO: 5.1 MILLION/UL (ref 3.88–5.62)
WBC # BLD AUTO: 6.3 THOUSAND/UL (ref 4.31–10.16)

## 2024-07-29 PROCEDURE — 85025 COMPLETE CBC W/AUTO DIFF WBC: CPT

## 2024-07-29 PROCEDURE — 3074F SYST BP LT 130 MM HG: CPT | Performed by: PHYSICIAN ASSISTANT

## 2024-07-29 PROCEDURE — 99214 OFFICE O/P EST MOD 30 MIN: CPT | Performed by: PHYSICIAN ASSISTANT

## 2024-07-29 PROCEDURE — 83690 ASSAY OF LIPASE: CPT

## 2024-07-29 PROCEDURE — 82985 ASSAY OF GLYCATED PROTEIN: CPT

## 2024-07-29 PROCEDURE — G0103 PSA SCREENING: HCPCS

## 2024-07-29 PROCEDURE — 36415 COLL VENOUS BLD VENIPUNCTURE: CPT

## 2024-07-29 PROCEDURE — 80076 HEPATIC FUNCTION PANEL: CPT

## 2024-07-29 PROCEDURE — 3078F DIAST BP <80 MM HG: CPT | Performed by: PHYSICIAN ASSISTANT

## 2024-07-29 NOTE — PROGRESS NOTES
[facility] - Outpatient Follow-up Note  Nixon Bailey . 58 y.o. male MRN: [id number]  Encounter: [id number]          ASSESSMENT AND PLAN:      1. IgG4 related disease  2. 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 Epigastric pain  Pt says he was doing well and is following with [facility] for his rituxan, which he just received a few weeks ago. However on July 15, he went to a concern and had one pork chop and one can of lite felton lite and a few hours later, he started with epigastric pain radiating to his back and bloating. He says he started a bland , low fat and low carb diet and his pain has slowed down, but he still gets a dull ache a couple of times/day after eating. He says his jardiance dose was increased about 7-8 months ago but this was the only medication change and has not started any new meds. He denies taking any OTC supplements except for tylenol once in a while. Physical exam is benign.   -continue low fat, low residue diet with increased hydration  -strict ED precautions given  - Lipase; Future  - CBC and differential; Future  - MRI abdomen w wo contrast and mrcp; Future  -explained that I will send a message to Dr. Singleton to see if would like anything else done at this time    ______________________________________________________________________    SUBJECTIVE:  Pt says he was doing well and is following with [facility] for his rituxan, which he just received a few weeks ago. However on July 15, he went to a concern and had one pork chop and one can of lite felton lite and a few hours later, he started with epigastric pain radiating to his back and bloating. He says he started a bland , low fat and low carb diet and his pain has slowed down, but he still gets a dull ache a couple of times/day after eating. He says his jardiance dose was increased about 7-8 months ago but this was the only medication change and has not started any new meds. He  denies taking any OTC supplements except for tylenol once in a while. He says he usually only has one can of beer every few weeks with dinner. He denies tobacco use as he quit several years ago. He denies illicit drug use, Family hx of colon cancer or pancreatic cancer, unintentional weight loss, fevers, chills, night sweats, constipation or straining, diarrhea, lower abdominal pain, bloody or black BM, yellowing of skin or eyes, pale stool, dark urine.       REVIEW OF SYSTEMS IS OTHERWISE NEGATIVE.      Historical Information   Past Medical History:   Diagnosis Date    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HCC)     Colon polyp     Diabetes mellitus (HCC)     blood sugar 121 @ 0600    GERD (gastroesophageal reflux disease)     IgG4 related disease     Sarcoid      Past Surgical History:   Procedure Laterality Date    COLONOSCOPY      ESOPHAGOGASTRODUODENOSCOPY N/A 2019    Procedure: ESOPHAGOGASTRODUODENOSCOPY (EGD);  Surgeon: Trey Singleton MD;  Location: [facility] GI LAB;  Service: Gastroenterology    LYMPH NODE BIOPSY      LYMPH NODE DISSECTION  , left axilla    benign on pathology    OPTIC NERVE DECOMPRESSION      NC ESOPHAGOGASTRODUODENOSCOPY TRANSORAL DIAGNOSTIC N/A 2017    Procedure: EGD AND COLONOSCOPY;  Surgeon: Trey Singleton MD;  Location:  GI LAB;  Service: Gastroenterology    SINUS SURGERY      SINUS SURGERY      WISDOM TOOTH EXTRACTION       Social History   Social History     Substance and Sexual Activity   Alcohol Use Not Currently    Comment: rarely     Social History     Substance and Sexual Activity   Drug Use Never     Social History     Tobacco Use   Smoking Status Former    Current packs/day: 0.00    Average packs/day: 1 pack/day for 25.0 years (25.0 ttl pk-yrs)    Types: Cigarettes    Start date: 1991    Quit date: 2016    Years since quittin.2   Smokeless Tobacco Never     Family History   Problem Relation Age of Onset    Alzheimer's disease Mother     Hypertension Mother      "Osteoporosis Mother     Hypertension Father     Heart attack Father     Osteoporosis Sister     Diabetes Brother     Diabetes type II Brother     Hyperthyroidism Brother     Diabetes type II Paternal Aunt        Meds/Allergies       Current Outpatient Medications:     Ascorbic Acid (VITAMIN C) 1000 MG tablet    b complex vitamins tablet    Continuous Blood Gluc Sensor (Dexcom G6 Sensor) MISC    Continuous Blood Gluc Sensor (Dexcom G7 Sensor)    Cyanocobalamin (VITAMIN B12 PO)    Diclofenac Sodium (VOLTAREN) 1 %    Droplet Pen Needles 31G X 8 MM MISC    Empagliflozin 25 MG TABS    famotidine (PEPCID) 40 MG tablet    FLUTICASONE PROPIONATE, INHAL, IN    folic acid (FOLVITE) 1 mg tablet    insulin aspart protamine-insulin aspart (NovoLOG Mix 70/30 FlexPen) 100 Units/mL injection pen    latanoprost (XALATAN) 0.005 % ophthalmic solution    lisinopril (ZESTRIL) 20 mg tablet    metFORMIN (GLUCOPHAGE) 1000 MG tablet    methotrexate 2.5 mg tablet    Multiple Vitamins-Minerals (PRESERVISION AREDS PO)    multivitamin (THERAGRAN) TABS    NovoLOG Mix 70/30 FlexPen (70-30) 100 units/mL injection pen    Omega-3 Fatty Acids (FISH OIL PO)    riTUXimab (RITUXAN) injection    rosuvastatin (CRESTOR) 10 MG tablet    Vitamin D, Cholecalciferol, 1000 units CAPS    B-D ULTRAFINE III SHORT PEN 31G X 8 MM MISC    glucose blood test strip    mupirocin (BACTROBAN) 2 % ointment    OneTouch Ultra test strip    Allergies   Allergen Reactions    Cefaclor Hives and Rash     Tolerate amoxicillin             Objective     Blood pressure 118/64, temperature 97.9 °F (36.6 °C), height 5' 10\" (1.778 m), weight 78.9 kg (174 lb). Body mass index is 24.97 kg/m².      PHYSICAL EXAM:      General Appearance:   Alert, cooperative, no distress   HEENT:   Normocephalic, atraumatic, anicteric.     Neck:  Supple, symmetrical, trachea midline   Lungs:   Clear to auscultation bilaterally; no rales, rhonchi or wheezing; respirations unlabored    Heart::   Regular " rate and rhythm; no murmur, rub, or gallop.   Abdomen:   Soft, non-tender, non-distended; normal bowel sounds; no masses, no organomegaly    Genitalia:   Deferred    Rectal:   Deferred    Extremities:  No cyanosis, clubbing or edema    Pulses:  2+ and symmetric    Skin:  No jaundice, rashes, or lesions    Lymph nodes:  No palpable cervical lymphadenopathy        Lab Results:   No visits with results within 1 Day(s) from this visit.   Latest known visit with results is:   Appointment on 03/16/2024   Component Date Value    Hemoglobin A1C 03/16/2024 7.7 (H)     EAG 03/16/2024 174     Cholesterol 03/16/2024 161     Triglycerides 03/16/2024 144     HDL, Direct 03/16/2024 54     LDL Calculated 03/16/2024 78     Non-HDL-Chol (CHOL-HDL) 03/16/2024 107     Creatinine, Ur 03/16/2024 69.4     Albumin,U,Random 03/16/2024 <7.0     Albumin Creat Ratio 03/16/2024 <10          Radiology Results:   No results found.   Answers submitted by the patient for this visit:  Abdominal Pain Questionnaire (Submitted on 7/27/2024)  Chief Complaint: Abdominal pain  Chronicity: recurrent  Onset: 1 to 4 weeks ago  Onset quality: sudden  Frequency: 2 to 4 times per day  Episode duration: 3 Hours  Progression since onset: gradually improving  Pain location: LUQ  Pain - numeric: 4/10  Pain quality: aching, a sensation of fullness  Radiates to: back  anorexia: No  arthralgias: No  belching: No  constipation: No  diarrhea: No  dysuria: No  fever: No  flatus: No  frequency: No  headaches: No  hematochezia: No  hematuria: No  melena: No  myalgias: No  nausea: No  weight loss: No  vomiting: No  Aggravated by: eating  Diagnostic workup: lower endoscopy

## 2024-07-29 NOTE — Clinical Note
Good morning! How are you? I hope you are doing well. I saw this pt in the office today; he used to follow with you but was doing well for many years so he has not been seen since 2019 (except for his colonoscopy). He says he had a pork chop and one can of beer on 7/15 and since then, has been having epigastric pain that radiates to the pain. He says this feels like a flare. He says his pain is indeed improving so I ordered lipase, CBC and MRCP on him to be done ASAP. As I have never seen a pt with IGG4 disease: Is this ok with you, or do you prefer anything else? Thanks so much!

## 2024-07-30 LAB — FRUCTOSAMINE SERPL-SCNC: 305 UMOL/L (ref 0–285)

## 2024-08-09 ENCOUNTER — FOLLOW UP (OUTPATIENT)
Dept: URBAN - METROPOLITAN AREA CLINIC 6 | Facility: CLINIC | Age: 59
End: 2024-08-09

## 2024-08-09 DIAGNOSIS — H25.813: ICD-10-CM

## 2024-08-09 DIAGNOSIS — H40.1131: ICD-10-CM

## 2024-08-09 DIAGNOSIS — H04.123: ICD-10-CM

## 2024-08-09 PROCEDURE — 92012 INTRM OPH EXAM EST PATIENT: CPT

## 2024-08-09 PROCEDURE — 92083 EXTENDED VISUAL FIELD XM: CPT

## 2024-08-09 ASSESSMENT — TONOMETRY
OD_IOP_MMHG: 09
OD_IOP_MMHG: 9
OS_IOP_MMHG: 12
OS_IOP_MMHG: 13

## 2024-08-09 ASSESSMENT — VISUAL ACUITY
OD_CC: 20/400
OU_CC: J1
OS_CC: 20/50+1

## 2024-08-10 DIAGNOSIS — Z79.4 TYPE 2 DIABETES MELLITUS WITH HYPERGLYCEMIA, WITH LONG-TERM CURRENT USE OF INSULIN (HCC): ICD-10-CM

## 2024-08-10 DIAGNOSIS — E11.65 TYPE 2 DIABETES MELLITUS WITH HYPERGLYCEMIA, WITH LONG-TERM CURRENT USE OF INSULIN (HCC): ICD-10-CM

## 2024-08-11 RX ORDER — EMPAGLIFLOZIN 25 MG/1
25 TABLET, FILM COATED ORAL EVERY MORNING
Qty: 30 TABLET | Refills: 5 | Status: SHIPPED | OUTPATIENT
Start: 2024-08-11

## 2024-08-12 DIAGNOSIS — Z79.4 TYPE 2 DIABETES MELLITUS WITH HYPERGLYCEMIA, WITH LONG-TERM CURRENT USE OF INSULIN (HCC): ICD-10-CM

## 2024-08-12 DIAGNOSIS — E11.65 TYPE 2 DIABETES MELLITUS WITH HYPERGLYCEMIA, WITH LONG-TERM CURRENT USE OF INSULIN (HCC): ICD-10-CM

## 2024-08-12 RX ORDER — PEN NEEDLE, DIABETIC 31 GX5/16"
NEEDLE, DISPOSABLE MISCELLANEOUS 2 TIMES DAILY
Qty: 100 EACH | Refills: 3 | Status: SHIPPED | OUTPATIENT
Start: 2024-08-12

## 2024-08-15 ENCOUNTER — OFFICE VISIT (OUTPATIENT)
Dept: ENDOCRINOLOGY | Facility: CLINIC | Age: 59
End: 2024-08-15
Payer: COMMERCIAL

## 2024-08-15 VITALS
HEIGHT: 70 IN | HEART RATE: 81 BPM | SYSTOLIC BLOOD PRESSURE: 140 MMHG | WEIGHT: 173.2 LBS | BODY MASS INDEX: 24.79 KG/M2 | DIASTOLIC BLOOD PRESSURE: 90 MMHG | OXYGEN SATURATION: 98 %

## 2024-08-15 DIAGNOSIS — E11.65 TYPE 2 DIABETES MELLITUS WITH HYPERGLYCEMIA, WITH LONG-TERM CURRENT USE OF INSULIN (HCC): Primary | ICD-10-CM

## 2024-08-15 DIAGNOSIS — E78.2 MIXED HYPERLIPIDEMIA: ICD-10-CM

## 2024-08-15 DIAGNOSIS — I10 ESSENTIAL HYPERTENSION: ICD-10-CM

## 2024-08-15 DIAGNOSIS — Z79.4 CURRENT USE OF INSULIN (HCC): ICD-10-CM

## 2024-08-15 DIAGNOSIS — Z79.4 TYPE 2 DIABETES MELLITUS WITH HYPERGLYCEMIA, WITH LONG-TERM CURRENT USE OF INSULIN (HCC): Primary | ICD-10-CM

## 2024-08-15 LAB — SL AMB POCT HEMOGLOBIN AIC: 7.5 (ref ?–6.5)

## 2024-08-15 PROCEDURE — 83036 HEMOGLOBIN GLYCOSYLATED A1C: CPT

## 2024-08-15 PROCEDURE — 99214 OFFICE O/P EST MOD 30 MIN: CPT

## 2024-08-15 RX ORDER — INSULIN ASPART 100 [IU]/ML
INJECTION, SUSPENSION SUBCUTANEOUS
Qty: 15 ML | Refills: 3 | Status: SHIPPED | OUTPATIENT
Start: 2024-08-15

## 2024-08-15 NOTE — ASSESSMENT & PLAN NOTE
Dexcom does not appear to be providing accurate readings based on discrepancies between fructosamine and hemoglobin A1c which do match each other.  Fingerstick was done in office which revealed a result of 121 while Dexcom reading was 96 which is >20% in regards to accuracy. He does regularly calibrate his Dexcom. Medications that may confound results were reviewed with patient today and he does not appear to take any that would interfere.    For now, I have provided him with a sample for Mary 3. He will note if accuracy is better and if so, would be agreeable to switch to this.  Otherwise I have encouraged him to revert back to fingersticks.     For now, will increase insulin to 11 units BID. Notify for BG < 70.     Lab Results   Component Value Date    HGBA1C 7.5 (A) 08/15/2024

## 2024-08-15 NOTE — PROGRESS NOTES
Established Patient Progress Note    CC: Follow-up for type 2 diabetes mellitus    Impression & Plan:    Problem List Items Addressed This Visit       Mixed hyperlipidemia     Update prior to next appointment.  Continue with statin         Type 2 diabetes mellitus with hyperglycemia, with long-term current use of insulin (HCC) - Primary     Dexcom does not appear to be providing accurate readings based on discrepancies between fructosamine and hemoglobin A1c which do match each other.  Fingerstick was done in office which revealed a result of 121 while Dexcom reading was 96 which is >20% in regards to accuracy. He does regularly calibrate his Dexcom. Medications that may confound results were reviewed with patient today and he does not appear to take any that would interfere.    For now, I have provided him with a sample for Mary 3. He will note if accuracy is better and if so, would be agreeable to switch to this.  Otherwise I have encouraged him to revert back to fingersticks.     For now, will increase insulin to 11 units BID. Notify for BG < 70.     Lab Results   Component Value Date    HGBA1C 7.5 (A) 08/15/2024            Relevant Medications    NovoLOG Mix 70/30 FlexPen (70-30) 100 units/mL injection pen    Other Relevant Orders    POCT hemoglobin A1c (Completed)    Fructosamine    Hemoglobin A1C    Lipid panel    Essential hypertension     Slightly elevated in office  - will reassess at next visit         Current use of insulin (HCC)    Relevant Medications    NovoLOG Mix 70/30 FlexPen (70-30) 100 units/mL injection pen       Orders Placed This Encounter   Procedures    Fructosamine     Standing Status:   Future     Standing Expiration Date:   8/15/2025    Hemoglobin A1C     Standing Status:   Future     Standing Expiration Date:   8/15/2025    Lipid panel     This is a patient instruction: This test requires patient fasting for 10-12 hours or longer. Drinking of black coffee or black tea is acceptable.      Standing Status:   Future     Standing Expiration Date:   8/15/2025    POCT hemoglobin A1c       History of Present Illness:   Nixon Bailey Jr. is a 58 y.o. male with a history of type 2 diabetes, hypertension, hyperlipidemia, IgG4 related disease causing recurrent/chronic pancreatitis. Complications:  none. Last A1C was 7.5. Home glucose monitoring: CGM    Patient is being managed by GI for chronic pancreatitis.  He has an MRI scheduled for tomorrow.  He reports making dietary changes which includes more carbs because of his GI issues.    Hypoglycemia: no  Problems with current regimen: Notes discrepancies between Dexcom and fingersticks    CGM Interpretation  Nixon Bailey Jr.   Device used Dexcom  Home use   Indication: Type 2 Diabetes  More than 72 hours of data was reviewed. Report to be scanned to chart.   Date Range: Aug 2 to Aug 15, 2024  Analysis of data:   Average Glucose: 136  SD : 30  Time in Target Range: 90%  Time Above Range: high: 10%; very high: 0%  Time Below Range: low: 0%; very low: <1%  Interpretation of data: unable to determine accuracy      Current regimen:   Jardiance 25 mg daily  Metofmrin 1,000 mg BID  Novolog 70/30 : 9 units prior to breakfast and 7 units prior to dinner    ACE/ARB: lisinopril   Has hyperlipidemia: Taking rosuvastatin      [facility] exam: [facility]  Foot exam: UTD, May 2024  History of pancreatitis: yes- chronic    Patient Active Problem List   Diagnosis    Gastroesophageal reflux disease without esophagitis    IgG4 related disease    Idiopathic acute pancreatitis without infection or necrosis    Mixed hyperlipidemia    Type 2 diabetes mellitus with hyperglycemia, with long-term current use of insulin (HCC)    Essential hypertension    Current use of insulin (HCC)    DARYL (obstructive sleep apnea)    Long term current use of immunosuppressive drug      Past Medical History:   Diagnosis Date    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HCC)     Colon polyp     Diabetes  mellitus (HCC)     blood sugar 121 @ 0600    GERD (gastroesophageal reflux disease)     IgG4 related disease     Sarcoid       Past Surgical History:   Procedure Laterality Date    COLONOSCOPY      ESOPHAGOGASTRODUODENOSCOPY N/A 2019    Procedure: ESOPHAGOGASTRODUODENOSCOPY (EGD);  Surgeon: Trey Singleton MD;  Location: [facility];  Service: Gastroenterology    LYMPH NODE BIOPSY      LYMPH NODE DISSECTION  , left axilla    benign on pathology    OPTIC NERVE DECOMPRESSION      CO ESOPHAGOGASTRODUODENOSCOPY TRANSORAL DIAGNOSTIC N/A 2017    Procedure: EGD AND COLONOSCOPY;  Surgeon: Trey Singleton MD;  Location:  GI LAB;  Service: Gastroenterology    SINUS SURGERY      SINUS SURGERY      WISDOM TOOTH EXTRACTION        Family History   Problem Relation Age of Onset    Alzheimer's disease Mother     Hypertension Mother     Osteoporosis Mother     Hypertension Father     Heart attack Father     Osteoporosis Sister     Diabetes Brother     Diabetes type II Brother     Hyperthyroidism Brother     Diabetes type II Paternal Aunt      Social History     Tobacco Use    Smoking status: Former     Current packs/day: 0.00     Average packs/day: 1 pack/day for 25.0 years (25.0 ttl pk-yrs)     Types: Cigarettes     Start date: 1991     Quit date: 2016     Years since quittin.3    Smokeless tobacco: Never   Substance Use Topics    Alcohol use: Not Currently     Comment: rarely     Allergies   Allergen Reactions    Cefaclor Hives and Rash     Tolerate amoxicillin           Current Outpatient Medications:     Ascorbic Acid (VITAMIN C) 1000 MG tablet, Take 1,000 mg by mouth daily, Disp: , Rfl:     b complex vitamins tablet, Take 1 tablet by mouth daily, Disp: , Rfl:     B-D ULTRAFINE III SHORT PEN 31G X 8 MM MISC, USE 2 (TWO) TIMES A DAY, Disp: 100 each, Rfl: 3    Continuous Blood Gluc Sensor (Dexcom G7 Sensor), Use 1 Device every 10 days, Disp: 9 each, Rfl: 2    Cyanocobalamin (VITAMIN B12 PO), Take 1  tablet by mouth daily, Disp: , Rfl:     Diclofenac Sodium (VOLTAREN) 1 %, Apply 2 g topically 2 (two) times a day as needed (knee pain/OA), Disp: 1 Tube, Rfl: 0    Droplet Pen Needles 31G X 8 MM MISC, INJECT UNDER THE SKIN 4 (FOUR) TIMES A DAY, Disp: 400 each, Rfl: 1    Empagliflozin (Jardiance) 25 MG TABS, TAKE 1 TABLET BY MOUTH EVERY DAY IN THE MORNING, Disp: 30 tablet, Rfl: 5    famotidine (PEPCID) 40 MG tablet, Take 1 tablet (40 mg total) by mouth 2 (two) times a day (Patient taking differently: Take 20 mg by mouth 2 (two) times a day), Disp: 180 tablet, Rfl: 3    FLUTICASONE PROPIONATE, INHAL, IN, Inhale 2 sprays 2 (two) times a day, Disp: , Rfl:     folic acid (FOLVITE) 1 mg tablet, Take 1 mg by mouth daily, Disp: , Rfl:     insulin aspart protamine-insulin aspart (NovoLOG Mix 70/30 FlexPen) 100 Units/mL injection pen, , Disp: , Rfl:     latanoprost (XALATAN) 0.005 % ophthalmic solution, 1 drop daily at bedtime, Disp: , Rfl:     lisinopril (ZESTRIL) 20 mg tablet, Take 1 tablet (20 mg total) by mouth daily, Disp: 90 tablet, Rfl: 1    metFORMIN (GLUCOPHAGE) 1000 MG tablet, Take 1 tablet (1,000 mg total) by mouth 2 (two) times a day with meals, Disp: 60 tablet, Rfl: 5    methotrexate 2.5 mg tablet, 15 mg once a week, Disp: , Rfl:     Multiple Vitamins-Minerals (PRESERVISION AREDS PO), Take 1 tablet by mouth daily, Disp: , Rfl:     mupirocin (BACTROBAN) 2 % ointment, , Disp: , Rfl:     NovoLOG Mix 70/30 FlexPen (70-30) 100 units/mL injection pen, Inject 11 units prior to breakfast and 11 prior to dinner, Disp: 15 mL, Rfl: 3    Omega-3 Fatty Acids (FISH OIL PO), Take 1 capsule by mouth daily, Disp: , Rfl:     riTUXimab (RITUXAN) injection, Infuse into a venous catheter , Disp: , Rfl:     rosuvastatin (CRESTOR) 10 MG tablet, Take 1 tablet (10 mg total) by mouth daily, Disp: 90 tablet, Rfl: 3    Vitamin D, Cholecalciferol, 1000 units CAPS, Take 1 tablet by mouth in the morning, Disp: , Rfl:     Continuous Blood Gluc  "Sensor (Dexcom G6 Sensor) MISC, Change sensor every 10 days (Patient not taking: Reported on 8/15/2024), Disp: 1 each, Rfl: 0    glucose blood test strip, onetouch     calvin ultra (Patient not taking: Reported on 7/2/2024), Disp: , Rfl:     multivitamin (THERAGRAN) TABS, Take 1 tablet by mouth daily (Patient not taking: Reported on 8/15/2024), Disp: , Rfl:     OneTouch Ultra test strip, Check up to 3 times daily as needed before meals and at bedtime. (Patient not taking: Reported on 7/2/2024), Disp: 300 strip, Rfl: 2    Review of Systems   Constitutional:  Negative for chills and fever.   HENT:  Negative for ear pain and sore throat.    Eyes:  Negative for pain and visual disturbance.   Respiratory:  Negative for cough and shortness of breath.    Cardiovascular:  Negative for chest pain and palpitations.   Gastrointestinal:  Negative for abdominal pain and vomiting.   Genitourinary:  Negative for dysuria and hematuria.   Musculoskeletal:  Negative for arthralgias and back pain.   Skin:  Negative for color change and rash.   Neurological:  Negative for seizures and syncope.   All other systems reviewed and are negative.      Physical Exam:  Body mass index is 24.85 kg/m².  /90   Pulse 81   Ht 5' 10\" (1.778 m)   Wt 78.6 kg (173 lb 3.2 oz)   SpO2 98%   BMI 24.85 kg/m²    Wt Readings from Last 3 Encounters:   08/15/24 78.6 kg (173 lb 3.2 oz)   07/29/24 78.9 kg (174 lb)   07/02/24 79.6 kg (175 lb 6.4 oz)       Physical Exam  Vitals reviewed.   Constitutional:       Appearance: Normal appearance.   HENT:      Head: Normocephalic and atraumatic.   Cardiovascular:      Rate and Rhythm: Normal rate.   Pulmonary:      Effort: Pulmonary effort is normal.   Neurological:      Mental Status: He is alert and oriented to person, place, and time.   Psychiatric:         Mood and Affect: Mood normal.         Behavior: Behavior normal.       Diabetic Foot Exam    Labs:   Lab Results   Component Value Date    HGBA1C 7.5 (A) " "08/15/2024    HGBA1C 7.7 (H) 03/16/2024    HGBA1C 7.4 (H) 11/06/2023     Lab Results   Component Value Date    CREATININE 0.87 06/21/2024    CREATININE 0.87 11/06/2023    CREATININE 0.89 08/29/2023    BUN 16 06/21/2024    K 4.1 06/21/2024    CL 97 (L) 06/21/2024    CO2 24 06/21/2024     GFR, Calculated   Date Value Ref Range Status   03/16/2018 96 >60 mL/min/1.73m2 Final     Comment:     mL/min per 1.73 square meters                                            Normal Function or Mild Renal    Disease (if clinically at risk):  >or=60  Moderately Decreased:                30-59  Severely Decreased:                  15-29  Renal Failure:                         <15                                            -American GFR: multiply reported GFR by 1.16    Please note that the eGFR is based on the CKD-EPI calculation, and is not intended to be used for drug dosing.                                            Note: Calculated GFR may not be an accurate indicator of renal function if the patient's renal function is not in a steady state.     eGFRcr   Date Value Ref Range Status   06/21/2024 99 >=60 mL/min/1.73 m2 Final     Comment:     Estimated glomerular filtration rate (eGFR) is calculated without a race  coefficient. Values should be interpreted in the context of the patient's full  clinical presentation. Reference: Kaci Groves et al. \"A unifying approach  for GFR estimation: recommendations of the NKF-ASN task force on reassessing the  inclusion of race in diagnosing kidney disease.\" American Journal of Kidney  Diseases (2021)     Lab Results   Component Value Date    HDL 54 03/16/2024    TRIG 144 03/16/2024     Lab Results   Component Value Date    ALT 23 07/29/2024    AST 18 07/29/2024    ALKPHOS 70 07/29/2024     Lab Results   Component Value Date    LWX1ARYUVJVR 2.210 09/24/2021    ZVE8JXMBVOWC 2.000 02/08/2020     No results found for: \"FREET4\", \"TSI\"      There are no Patient Instructions on file for " this visit.      Discussed with the patient and all questioned fully answered. He will call me if any problems arise.

## 2024-08-16 ENCOUNTER — HOSPITAL ENCOUNTER (OUTPATIENT)
Dept: RADIOLOGY | Facility: HOSPITAL | Age: 59
Discharge: HOME | End: 2024-08-16
Payer: COMMERCIAL

## 2024-08-16 DIAGNOSIS — R10.13 EPIGASTRIC PAIN: ICD-10-CM

## 2024-08-16 DIAGNOSIS — K85.00 IDIOPATHIC ACUTE PANCREATITIS WITHOUT INFECTION OR NECROSIS: ICD-10-CM

## 2024-08-16 PROCEDURE — 74183 MRI ABD W/O CNTR FLWD CNTR: CPT

## 2024-08-16 PROCEDURE — A9585 GADOBUTROL INJECTION: HCPCS | Performed by: INTERNAL MEDICINE

## 2024-08-16 RX ORDER — GADOBUTROL 604.72 MG/ML
7 INJECTION INTRAVENOUS
Status: COMPLETED | OUTPATIENT
Start: 2024-08-16 | End: 2024-08-16

## 2024-08-16 RX ADMIN — GADOBUTROL 7 ML: 604.72 INJECTION INTRAVENOUS at 17:38

## 2024-08-18 DIAGNOSIS — Z79.4 TYPE 2 DIABETES MELLITUS WITH HYPERGLYCEMIA, WITH LONG-TERM CURRENT USE OF INSULIN (HCC): ICD-10-CM

## 2024-08-18 DIAGNOSIS — E11.65 TYPE 2 DIABETES MELLITUS WITH HYPERGLYCEMIA, WITH LONG-TERM CURRENT USE OF INSULIN (HCC): ICD-10-CM

## 2024-08-18 RX ORDER — BLOOD SUGAR DIAGNOSTIC
STRIP MISCELLANEOUS
Qty: 300 STRIP | Refills: 1 | Status: SHIPPED | OUTPATIENT
Start: 2024-08-18

## 2024-09-28 DIAGNOSIS — Z79.4 TYPE 2 DIABETES MELLITUS WITH HYPERGLYCEMIA, WITH LONG-TERM CURRENT USE OF INSULIN (HCC): ICD-10-CM

## 2024-09-28 DIAGNOSIS — E11.65 TYPE 2 DIABETES MELLITUS WITH HYPERGLYCEMIA, WITH LONG-TERM CURRENT USE OF INSULIN (HCC): ICD-10-CM

## 2024-10-10 ENCOUNTER — INTERMEDIATE FOLLOW-UP (OUTPATIENT)
Dept: URBAN - METROPOLITAN AREA CLINIC 6 | Facility: CLINIC | Age: 59
End: 2024-10-10

## 2024-10-10 DIAGNOSIS — H25.813: ICD-10-CM

## 2024-10-10 DIAGNOSIS — H04.123: ICD-10-CM

## 2024-10-10 DIAGNOSIS — H40.1131: ICD-10-CM

## 2024-10-10 DIAGNOSIS — H17.89: ICD-10-CM

## 2024-10-10 PROCEDURE — 92012 INTRM OPH EXAM EST PATIENT: CPT

## 2024-10-10 ASSESSMENT — VISUAL ACUITY
OS_CC: 20/40-2
OD_CC: 20/400+1

## 2024-10-10 ASSESSMENT — TONOMETRY
OS_IOP_MMHG: 10
OD_IOP_MMHG: 7

## 2024-10-29 NOTE — PROGRESS NOTES
After connecting through the [employer] platform. He agrees to proceed., the patient was identified by name and date of birth. Nixon Bailey Jr. was informed that this is a telemedicine visit and that the visit is being conducted through the [employer] platform. He agrees to proceed..  My office door was closed. No one else was in the room.  He acknowledged consent and understanding of privacy and security of the video platform. The patient has agreed to participate and understands they can discontinue the visit at any time.      Ambulatory Visit  Name: Nixon Bailey Jr.      : 1965      MRN: [id number]  Encounter Provider: Francis Juárez MD  Encounter Date: 10/30/2024   Encounter department: [facility] [place]    Assessment & Plan  DARYL (obstructive sleep apnea)  Hx moderate DARYL on CPAP (AHI 20.1) with full facemask  Compliant with CPAP daily and deriving symptomatic benefit    Compliance data  10/24/2024  93.3% use over the past 30 days more than 4 hours  Average use 7 hours and 23 minutes  Keesha 2 CPAP 6-10 cm H2O  Average pressure 6.2, P90 five 7.5  3 minutes of high leak per day  Average AHI 0.3    Lacrimal leaking is rare but we should decrease pressures more  Pressure change to decrease pressures 5-8cm H2O  Continue with heated tubing  Resupply Rx provided, FFM, DME [employer]  F/u in 1 year    Orders:    PAP DME Pressure Change    PAP DME Resupply/Reorder      History of Present Illness   PMH of DM2, idiopathic pancreatitis, IgG4 mediated orbital pseudotumor, hypertension, hyperlipidemia, moderate obstructive sleep apnea who is presenting for follow-up    10/30/[id number] with me-(virtual) - He thinks the CPAP is better and he has received heated tubing.  He gets better quality sleep with CPAP and more energy levels during the day.  The lacrimal leaks are rare and no visual changes.  No nosebleeds.  He gets some sinus infections but don't think it's from CPAP.  No increased pressures  in ears and stomach.  He works at 6am everyday even on the weekends.       He has a history of moderate DARYL GRAHAM 20.1.  He is on APAP 6-10.  He used to have lacrimal duct air leaks which is rare and had his pressure significantly decreased.       He uses full facemask F20.  No skin irrtation/pain/discomfort/aerophagia.  He is asking about heated tubing as he has too much condensation in his tubing.     Doing well with CPAP and compliant.  [place] 5      Review of Systems  I have personally reviewed the MA's review of systems and made changes as necessary.    Medical History Reviewed by provider this encounter:  Tobacco  Allergies  Meds  Problems  Med Hx  Surg Hx  Fam Hx       Objective     There were no vitals taken for this visit.    Physical Exam  Constitutional:       General: He is not in acute distress.     Appearance: Normal appearance. He is not ill-appearing, toxic-appearing or diaphoretic.   Neurological:      Mental Status: He is alert.       Neurologic Exam    Results/Data:  I have reviewed the results and images from the compliance data in detail with the patient.    Sleep Study:  1/10/22 HST  TESTING RESULTS:   The test results are from 1 Night.  The total time in bed (analysis time)   was 405 minutes.       The patient had a total of 134 respiratory events made up of 40   obstructive apneas, 0 central apneas, 0 mixed apneas and 94 hypopneas   resulting in a respiratory event index (GRAHAM) of 20.1.       The lowest SpO2 recorded is 84% and 2% of the study was spent with   saturations below 90%.     The snore index was 31.7%.     Additional comments by patient:”  I feel like and never fell deeply asleep   after I wake each time to use the bathroom.”         Compliance data:  10/24/2024  93.3% use over the past 30 days more than 4 hours  Average use 7 hours and 23 minutes  Keesha 2 CPAP 6/10 cm H2O  Average pressure 6.2, P90 five 7.5  3 minutes of high leak per day  Average AHI 0.3    9/11/23  Keesha  G3  6-10cm H2O  97% use  Average use 6 hours 59 minutes  AHI 0.4   Mean pressure 6, 95% 7.1

## 2024-10-29 NOTE — ASSESSMENT & PLAN NOTE
Hx moderate DARYL on CPAP (AHI 20.1) with full facemask  Compliant with CPAP daily and deriving symptomatic benefit    Compliance data  10/24/2024  93.3% use over the past 30 days more than 4 hours  Average use 7 hours and 23 minutes  Keesha 2 CPAP 6-10 cm H2O  Average pressure 6.2, P90 five 7.5  3 minutes of high leak per day  Average AHI 0.3    Lacrimal leaking is rare but we should decrease pressures more  Pressure change to decrease pressures 5-8cm H2O  Continue with heated tubing  Resupply Rx provided, FFM, DME [employer]  F/u in 1 year    Orders:    PAP DME Pressure Change    PAP DME Resupply/Reorder

## 2024-10-30 ENCOUNTER — TELEMEDICINE (OUTPATIENT)
Dept: SLEEP CENTER | Facility: CLINIC | Age: 59
End: 2024-10-30
Payer: COMMERCIAL

## 2024-10-30 DIAGNOSIS — G47.33 OSA (OBSTRUCTIVE SLEEP APNEA): Primary | ICD-10-CM

## 2024-10-30 PROCEDURE — 99213 OFFICE O/P EST LOW 20 MIN: CPT | Performed by: INTERNAL MEDICINE

## 2024-10-31 ENCOUNTER — TELEPHONE (OUTPATIENT)
Dept: SLEEP CENTER | Facility: CLINIC | Age: 59
End: 2024-10-31

## 2024-11-30 ENCOUNTER — APPOINTMENT (OUTPATIENT)
Dept: LAB | Facility: CLINIC | Age: 59
End: 2024-11-30
Payer: COMMERCIAL

## 2024-11-30 DIAGNOSIS — E11.65 TYPE 2 DIABETES MELLITUS WITH HYPERGLYCEMIA, WITH LONG-TERM CURRENT USE OF INSULIN (HCC): ICD-10-CM

## 2024-11-30 DIAGNOSIS — Z79.4 TYPE 2 DIABETES MELLITUS WITH HYPERGLYCEMIA, WITH LONG-TERM CURRENT USE OF INSULIN (HCC): ICD-10-CM

## 2024-11-30 LAB
CHOLEST SERPL-MCNC: 197 MG/DL (ref ?–200)
EST. AVERAGE GLUCOSE BLD GHB EST-MCNC: 166 MG/DL
HBA1C MFR BLD: 7.4 %
HDLC SERPL-MCNC: 52 MG/DL
LDLC SERPL CALC-MCNC: 107 MG/DL (ref 0–100)
NONHDLC SERPL-MCNC: 145 MG/DL
TRIGL SERPL-MCNC: 189 MG/DL (ref ?–150)

## 2024-11-30 PROCEDURE — 36415 COLL VENOUS BLD VENIPUNCTURE: CPT

## 2024-11-30 PROCEDURE — 82985 ASSAY OF GLYCATED PROTEIN: CPT

## 2024-11-30 PROCEDURE — 80061 LIPID PANEL: CPT

## 2024-11-30 PROCEDURE — 83036 HEMOGLOBIN GLYCOSYLATED A1C: CPT

## 2024-12-01 LAB — FRUCTOSAMINE SERPL-SCNC: 290 UMOL/L (ref 0–285)

## 2024-12-02 RX ORDER — PEN NEEDLE, DIABETIC 31 GX5/16"
NEEDLE, DISPOSABLE MISCELLANEOUS 2 TIMES DAILY
Qty: 100 EACH | Refills: 3 | Status: SHIPPED | OUTPATIENT
Start: 2024-12-02

## 2024-12-03 ENCOUNTER — RESULTS FOLLOW-UP (OUTPATIENT)
Dept: ENDOCRINOLOGY | Facility: CLINIC | Age: 59
End: 2024-12-03

## 2024-12-12 ENCOUNTER — OFFICE VISIT (OUTPATIENT)
Dept: ENDOCRINOLOGY | Facility: CLINIC | Age: 59
End: 2024-12-12
Payer: COMMERCIAL

## 2024-12-12 VITALS
DIASTOLIC BLOOD PRESSURE: 90 MMHG | BODY MASS INDEX: 25.43 KG/M2 | HEART RATE: 85 BPM | HEIGHT: 70 IN | WEIGHT: 177.6 LBS | OXYGEN SATURATION: 97 % | SYSTOLIC BLOOD PRESSURE: 150 MMHG

## 2024-12-12 DIAGNOSIS — E11.65 TYPE 2 DIABETES MELLITUS WITH HYPERGLYCEMIA, WITH LONG-TERM CURRENT USE OF INSULIN (HCC): Primary | ICD-10-CM

## 2024-12-12 DIAGNOSIS — Z79.4 TYPE 2 DIABETES MELLITUS WITH HYPERGLYCEMIA, WITH LONG-TERM CURRENT USE OF INSULIN (HCC): Primary | ICD-10-CM

## 2024-12-12 PROCEDURE — 95251 CONT GLUC MNTR ANALYSIS I&R: CPT | Performed by: INTERNAL MEDICINE

## 2024-12-12 PROCEDURE — 99214 OFFICE O/P EST MOD 30 MIN: CPT | Performed by: INTERNAL MEDICINE

## 2024-12-12 NOTE — PROGRESS NOTES
Established Patient Progress Note      Chief Complaint   Patient presents with   • Diabetes Type 2      Referring Provider  No referring provider defined for this encounter.     History of Present Illness:   Nixon Martinezsus Eagle is a 59 y.o. male with a history of type 2 diabetes, last seen in Aug 2024 with Valarie PERSON      Diabetes hx:   He was dx with T2DM in 2002, but started insulin in 2016.    He has been seen by GI for idiopathic pancreatitis. He recalls one discrete episode of pancreatitis, but also several times when he had back pain.  in July 202, he had another episode o back pain which he was concerned     Of note, he had COVID in Oct 2022. Bgs were elevated and with steroid use    He was taking Janumet and glimepiride initially, but was changed to Novolog 70/30 after the episode of pancreatitis.   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 He follows closely with [facility] ([facility]). For maintenance, he gets Rituximab every 6mos and methotrexate.       He works as an , but tried to follow a very good effort  Current regimen: Novolog 70/30 11units bf and 11units dinner, metformin 1g twice daily and Jardiance 25mg once daily.    Exercise is walking, but he has been traveling for work which has changed his eating patterns and exercise.        Eyes: May 2023, Dr. Zarate monitoring for glaucoma; also seen [facility] Jan 2024. Will also need cataract surgery eventually  Pod: self care also seen Dr Knox in June 2024      He does take lisinopril and simvastatin without issues.     Nixon DOAN Bailey Jr.   Device used   Home use Dexcom G7    Indication   Type 2 Diabetes    More than 72 hours of data was reviewed. Report to be scanned to chart.     Date Range:   11/29/2024-12/12/2024    Analysis of data:   Average Glucose: 142  Coefficient of Variation: n/a  SD : 26 mg/dL  Time in Target Range: 91%  Time Above Range: 9% 0% v  high  Time Below Range: 0     Interpretation of data:   No hypoglycemia. Still with some lunch elevations, but this is related to food    Of note, fructosamine was 290, which is closer to CGM then A1c.           Patient Active Problem List   Diagnosis   • Gastroesophageal reflux disease without esophagitis   • 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HCC)   • Idiopathic acute pancreatitis without infection or necrosis   • Mixed hyperlipidemia   • Type 2 diabetes mellitus with hyperglycemia, with long-term current use of insulin (HCC)   • Essential hypertension   • Current use of insulin (HCC)   • DARYL (obstructive sleep apnea)   • Long term current use of immunosuppressive drug      Past Medical History:   Diagnosis Date   • 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HCC)    • Colon polyp    • Diabetes mellitus (HCC)     blood sugar 121 @ 0600   • GERD (gastroesophageal reflux disease)    • 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HCC)    • Sarcoid       Past Surgical History:   Procedure Laterality Date   • COLONOSCOPY     • ESOPHAGOGASTRODUODENOSCOPY N/A 4/2/2019    Procedure: ESOPHAGOGASTRODUODENOSCOPY (EGD);  Surgeon: Trey Singleton MD;  Location: [facility];  Service: Gastroenterology   • LYMPH NODE BIOPSY     • LYMPH NODE DISSECTION  2009, left axilla    benign on pathology   • OPTIC NERVE DECOMPRESSION     • CA ESOPHAGOGASTRODUODENOSCOPY TRANSORAL DIAGNOSTIC N/A 5/26/2017    Procedure: EGD AND COLONOSCOPY;  Surgeon: Trey Singleton MD;  Location:  GI LAB;  Service: Gastroenterology   • SINUS SURGERY     • SINUS SURGERY     • WISDOM TOOTH EXTRACTION        Family History   Problem Relation Age of Onset   • Alzheimer's disease Mother    • Hypertension Mother    • Osteoporosis Mother    • Hypertension Father    • Heart attack Father    • Osteoporosis Sister    • Diabetes Brother    • Diabetes type II Brother    • Hyperthyroidism Brother    • Diabetes type II Paternal Aunt      Social History     Tobacco Use   • Smoking status: Former     Current packs/day: 0.00      Average packs/day: 1 pack/day for 25.0 years (25.0 ttl pk-yrs)     Types: Cigarettes     Start date: 1991     Quit date: 2016     Years since quittin.6   • Smokeless tobacco: Never   Substance Use Topics   • Alcohol use: Not Currently     Comment: rarely     Allergies   Allergen Reactions   • Cefaclor Hives and Rash     Tolerate amoxicillin           Current Outpatient Medications:   •  Ascorbic Acid (VITAMIN C) 1000 MG tablet, Take 1,000 mg by mouth daily, Disp: , Rfl:   •  b complex vitamins tablet, Take 1 tablet by mouth daily, Disp: , Rfl:   •  B-D ULTRAFINE III SHORT PEN 31G X 8 MM MISC, USE 2 (TWO) TIMES A DAY, Disp: 100 each, Rfl: 3  •  Continuous Blood Gluc Sensor (Dexcom G7 Sensor), Use 1 Device every 10 days, Disp: 9 each, Rfl: 2  •  Cyanocobalamin (VITAMIN B12 PO), Take 1 tablet by mouth daily, Disp: , Rfl:   •  Diclofenac Sodium (VOLTAREN) 1 %, Apply 2 g topically 2 (two) times a day as needed (knee pain/OA), Disp: 1 Tube, Rfl: 0  •  Empagliflozin (Jardiance) 25 MG TABS, TAKE 1 TABLET BY MOUTH EVERY DAY IN THE MORNING, Disp: 30 tablet, Rfl: 5  •  famotidine (PEPCID) 40 MG tablet, Take 1 tablet (40 mg total) by mouth 2 (two) times a day (Patient taking differently: Take 20 mg by mouth 2 (two) times a day), Disp: 180 tablet, Rfl: 3  •  FLUTICASONE PROPIONATE, INHAL, IN, Inhale 2 sprays 2 (two) times a day, Disp: , Rfl:   •  folic acid (FOLVITE) 1 mg tablet, Take 1 mg by mouth daily, Disp: , Rfl:   •  latanoprost (XALATAN) 0.005 % ophthalmic solution, 1 drop daily at bedtime, Disp: , Rfl:   •  lisinopril (ZESTRIL) 20 mg tablet, Take 1 tablet (20 mg total) by mouth daily, Disp: 90 tablet, Rfl: 1  •  metFORMIN (GLUCOPHAGE) 1000 MG tablet, TAKE 1 TABLET BY MOUTH TWICE A DAY WITH MEALS, Disp: 60 tablet, Rfl: 5  •  methotrexate 2.5 mg tablet, 15 mg once a week, Disp: , Rfl:   •  Multiple Vitamins-Minerals (PRESERVISION AREDS PO), Take 1 tablet by mouth daily, Disp: , Rfl:   •  mupirocin (BACTROBAN)  "2 % ointment, , Disp: , Rfl:   •  NovoLOG Mix 70/30 FlexPen (70-30) 100 units/mL injection pen, Inject 11 units prior to breakfast and 11 prior to dinner, Disp: 15 mL, Rfl: 3  •  Omega-3 Fatty Acids (FISH OIL PO), Take 1 capsule by mouth daily, Disp: , Rfl:   •  OneTouch Ultra test strip, Check up to 3 times daily as needed before meals and at bedtime., Disp: 300 strip, Rfl: 1  •  riTUXimab (RITUXAN) injection, Infuse into a venous catheter , Disp: , Rfl:   •  rosuvastatin (CRESTOR) 10 MG tablet, Take 1 tablet (10 mg total) by mouth daily, Disp: 90 tablet, Rfl: 3  •  Vitamin D, Cholecalciferol, 1000 units CAPS, Take 1 tablet by mouth in the morning, Disp: , Rfl:   •  glucose blood test strip, onetouch     calvin ultra (Patient not taking: Reported on 7/2/2024), Disp: , Rfl:   •  multivitamin (THERAGRAN) TABS, Take 1 tablet by mouth daily (Patient not taking: Reported on 10/30/2024), Disp: , Rfl:   Review of Systems   Constitutional:  Positive for unexpected weight change.   HENT:  Negative for voice change.    Eyes:  Negative for visual disturbance.   Gastrointestinal:  Negative for constipation and diarrhea.   Endocrine: Negative for polydipsia and polyuria.   Neurological:  Negative for numbness.   Psychiatric/Behavioral:  The patient is not nervous/anxious.        Physical Exam:  Body mass index is 25.48 kg/m².  /90   Pulse 85   Ht 5' 10\" (1.778 m)   Wt 80.6 kg (177 lb 9.6 oz)   SpO2 97%   BMI 25.48 kg/m²    Wt Readings from Last 3 Encounters:   12/12/24 80.6 kg (177 lb 9.6 oz)   08/15/24 78.6 kg (173 lb 3.2 oz)   07/29/24 78.9 kg (174 lb)            Physical Exam   Gen: appears well-developed and well-nourished. No apparent distress.   Head: Normocephalic and atraumatic.   Eyes: no stare or proptosis, no periorbital edema  E/N/M nl facies, hearing grossly intact  Neck: range of motion nl.   Pulmonary/Chest: breathing  comfortably, no accessory muscle use, effort normal.   Musculoskeletal: moves all " extremities, gait nl  Neurological: alert and oriented to person, place, and time. No upper ext tremor appreciated  Skin: does not appear diaphoretic, no facial plethora  Psychiatric: normal mood and affect; behavior is normal; no gross lapses in memory, answer questions appropriately            Labs:     Lab Results   Component Value Date    HGBA1C 7.4 (H) 11/30/2024         Lab Results   Component Value Date    CREATININE 0.87 06/21/2024    CREATININE 0.87 11/06/2023    CREATININE 0.89 08/29/2023    BUN 16 06/21/2024    K 4.1 06/21/2024    CL 97 (L) 06/21/2024    CO2 24 06/21/2024       Lab Results   Component Value Date    HDL 52 11/30/2024    TRIG 189 (H) 11/30/2024       Lab Results   Component Value Date    ALT 23 07/29/2024    AST 18 07/29/2024    ALKPHOS 70 07/29/2024           Impression/Plan:  Problem List Items Addressed This Visit     Type 2 diabetes mellitus with hyperglycemia, with long-term current use of insulin (HCC) - Primary    He will continue metformin 1g twice daily, Novolog 70/30 11units bf and dinner, and Jardiance to 25mg daily. Discussed case reports of pancreatitis with Jardiance but at this time will not . I suspect his A1c is not  accurate but will continue to monitor it and Fructosamine. Up to date on eye and foot exams.          Relevant Orders    Fructosamine    Hemoglobin A1C    Basic metabolic panel            RTC in 4mos  Discussed with the patient and all questioned fully answered. He will call me if any problems arise.    Counseled patient on diagnostic results, prognosis, risk and benefit of treatment options, instruction for management, importance of treatment compliance, Risk  factor reduction and impressions      Val James MD

## 2024-12-12 NOTE — ASSESSMENT & PLAN NOTE
He will continue metformin 1g twice daily, Novolog 70/30 11units bf and dinner, and Jardiance to 25mg daily. Discussed case reports of pancreatitis with Jardiance but at this time will not . I suspect his A1c is not  accurate but will continue to monitor it and Fructosamine. Up to date on eye and foot exams.

## 2024-12-21 DIAGNOSIS — Z79.4 CURRENT USE OF INSULIN (HCC): ICD-10-CM

## 2024-12-21 DIAGNOSIS — E11.65 TYPE 2 DIABETES MELLITUS WITH HYPERGLYCEMIA, WITH LONG-TERM CURRENT USE OF INSULIN (HCC): ICD-10-CM

## 2024-12-21 DIAGNOSIS — Z79.4 TYPE 2 DIABETES MELLITUS WITH HYPERGLYCEMIA, WITH LONG-TERM CURRENT USE OF INSULIN (HCC): ICD-10-CM

## 2024-12-23 RX ORDER — INSULIN ASPART 100 [IU]/ML
INJECTION, SUSPENSION SUBCUTANEOUS
Qty: 15 ML | Refills: 2 | Status: SHIPPED | OUTPATIENT
Start: 2024-12-23

## 2025-01-08 ENCOUNTER — OFFICE VISIT (OUTPATIENT)
Dept: FAMILY MEDICINE CLINIC | Facility: CLINIC | Age: 60
End: 2025-01-08
Payer: COMMERCIAL

## 2025-01-08 VITALS
DIASTOLIC BLOOD PRESSURE: 83 MMHG | BODY MASS INDEX: 25.11 KG/M2 | SYSTOLIC BLOOD PRESSURE: 144 MMHG | WEIGHT: 175 LBS | HEART RATE: 86 BPM | TEMPERATURE: 98 F | OXYGEN SATURATION: 98 %

## 2025-01-08 DIAGNOSIS — I10 ESSENTIAL HYPERTENSION: ICD-10-CM

## 2025-01-08 DIAGNOSIS — M25.562 CHRONIC PAIN OF LEFT KNEE: ICD-10-CM

## 2025-01-08 DIAGNOSIS — G47.33 OSA (OBSTRUCTIVE SLEEP APNEA): ICD-10-CM

## 2025-01-08 DIAGNOSIS — Z79.4 TYPE 2 DIABETES MELLITUS WITH HYPERGLYCEMIA, WITH LONG-TERM CURRENT USE OF INSULIN (HCC): ICD-10-CM

## 2025-01-08 DIAGNOSIS — D89.84 IGG4 RELATED DISEASE (HCC): ICD-10-CM

## 2025-01-08 DIAGNOSIS — G89.29 CHRONIC PAIN OF LEFT KNEE: ICD-10-CM

## 2025-01-08 DIAGNOSIS — Z00.00 ROUTINE HEALTH MAINTENANCE: Primary | ICD-10-CM

## 2025-01-08 DIAGNOSIS — Z82.49 FAMILY HISTORY OF HEART DISEASE: ICD-10-CM

## 2025-01-08 DIAGNOSIS — F17.211 NICOTINE DEPENDENCE, CIGARETTES, IN REMISSION: ICD-10-CM

## 2025-01-08 DIAGNOSIS — Z91.89 AT RISK FOR HEART DISEASE: ICD-10-CM

## 2025-01-08 DIAGNOSIS — R53.82 CHRONIC FATIGUE: ICD-10-CM

## 2025-01-08 DIAGNOSIS — K21.9 GASTROESOPHAGEAL REFLUX DISEASE WITHOUT ESOPHAGITIS: ICD-10-CM

## 2025-01-08 DIAGNOSIS — E78.2 MIXED HYPERLIPIDEMIA: ICD-10-CM

## 2025-01-08 DIAGNOSIS — E11.65 TYPE 2 DIABETES MELLITUS WITH HYPERGLYCEMIA, WITH LONG-TERM CURRENT USE OF INSULIN (HCC): ICD-10-CM

## 2025-01-08 PROBLEM — Z91.09 ALLERGY TO ENVIRONMENTAL FACTORS: Status: ACTIVE | Noted: 2025-01-08

## 2025-01-08 PROBLEM — H05.119 ORBITAL PSEUDOTUMOR: Status: ACTIVE | Noted: 2017-05-08

## 2025-01-08 PROBLEM — R35.0 INCREASED FREQUENCY OF URINATION: Status: ACTIVE | Noted: 2025-01-08

## 2025-01-08 PROBLEM — E23.2 DIABETES INSIPIDUS (HCC): Status: ACTIVE | Noted: 2021-04-01

## 2025-01-08 PROBLEM — E78.1 HYPERTRIGLYCERIDEMIA: Status: ACTIVE | Noted: 2025-01-08

## 2025-01-08 PROBLEM — D86.9 SARCOIDOSIS: Status: ACTIVE | Noted: 2025-01-08

## 2025-01-08 PROBLEM — E78.5 DYSLIPIDEMIA: Status: ACTIVE | Noted: 2025-01-08

## 2025-01-08 PROBLEM — K62.1 COLORECTAL POLYPS: Status: ACTIVE | Noted: 2017-07-24

## 2025-01-08 PROBLEM — K63.5 COLORECTAL POLYPS: Status: ACTIVE | Noted: 2017-07-24

## 2025-01-08 LAB

## 2025-01-08 PROCEDURE — 99213 OFFICE O/P EST LOW 20 MIN: CPT | Performed by: FAMILY MEDICINE

## 2025-01-08 NOTE — ASSESSMENT & PLAN NOTE
- BP of 144/83 in office today  - Patient does not monitor BP regularly at home  - Currently on lisinopril 20 mg daily    Plan  - Encourage patient to monitor blood pressure regularly and log numbers  - Plan to increase lisinopril if needed

## 2025-01-08 NOTE — PROGRESS NOTES
Name: Nixon Bailey       : 1965      MRN: [id number]  Encounter Provider: Efren Tillman DO  Encounter Date: 2025   Encounter department: [facility]    Assessment & Plan  Routine health maintenance  -No acute complaints or concerns this time  - Denies any recent illness or change in status       Essential hypertension  - BP of 144/83 in office today  - Patient does not monitor BP regularly at home  - Currently on lisinopril 20 mg daily    Plan  - Encourage patient to monitor blood pressure regularly and log numbers  - Plan to increase lisinopril if needed       Chronic fatigue  -Patient noting chronic ongoing fatigue for some time  Orders:    Testosterone, free, total; Future    Sex Hormone Binding Globulin; Future    Vitamin D 25 hydroxy; Future    Iron Panel (Includes Ferritin, Iron Sat%, Iron, and TIBC); Future    TSH, 3rd generation with Free T4 reflex; Future    Type 2 diabetes mellitus with hyperglycemia, with long-term current use of insulin ([facility])    Lab Results   Component Value Date    HGBA1C 7.4 (H) 2024   - Follows Endo  - Wears Dexcom which reports his A1C is ar  - BG in am around 120         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HCC)  - Followed by [facility] regarding   - Gets routine MRI of brain/orbits       Gastroesophageal reflux disease without esophagitis  - No acute complaints or concerns regarding  - Continues on Pepcid 40 mg BID        DARYL (obstructive sleep apnea)  - No acute complaints or concerns regarding   - Wears CPAP nightly        Nicotine dependence, cigarettes, in remission  -Ongoing smoking cessation counseling  Orders:    CT lung screening program; Future    Chronic pain of left knee  - Patient notes years of lateral side knee pain  - Has followed up with PT in the past without any improvement  - Underwent left knee xray ' with no significant findings  - Was told he has a tight IT band  - Left knee  x-ray with no signs of fracture dislocation or degenerative changes    Plan  - Will follow-up ultrasound imaging of the  - Knee exercise resources provided   Orders:    US MSK limited; Future           History of Present Illness   59-year-old male past ministry of hypertension, DARYL, GERD, type 2 diabetes mellitus, hyperlipidemia, IgG4 disease, sarcoidosis, hyperlipidemia presents for routine health check.  Patient is without any acute complaints or concerns at this time.  Patient denies any recent or change in health status.    HPI  Review of Systems   Constitutional:  Negative for activity change, appetite change, chills, fatigue and fever.   HENT:  Negative for congestion, postnasal drip and rhinorrhea.    Eyes:  Negative for pain and visual disturbance.   Respiratory:  Negative for cough, chest tightness and shortness of breath.    Cardiovascular:  Negative for chest pain, palpitations and leg swelling.   Gastrointestinal:  Negative for abdominal pain, constipation and diarrhea.   Genitourinary:  Negative for decreased urine volume, difficulty urinating and dysuria.   Musculoskeletal:  Negative for arthralgias, back pain and myalgias.   Skin:  Negative for color change and rash.   Neurological:  Negative for dizziness, light-headedness and headaches.       Objective   /83 (BP Location: Left arm, Patient Position: Sitting, Cuff Size: Standard)   Pulse 86   Temp 98 °F (36.7 °C)   Wt 79.4 kg (175 lb)   SpO2 98%   BMI 25.11 kg/m²      Physical Exam  Vitals and nursing note reviewed.   Constitutional:       General: He is not in acute distress.     Appearance: Normal appearance. He is well-developed. He is not ill-appearing.   HENT:      Head: Normocephalic and atraumatic.      Right Ear: External ear normal.      Left Ear: External ear normal.      Nose: Nose normal.   Eyes:      Conjunctiva/sclera: Conjunctivae normal.   Cardiovascular:      Rate and Rhythm: Normal rate and regular rhythm.      Pulses:  Normal pulses.      Heart sounds: Normal heart sounds. No murmur heard.  Pulmonary:      Effort: Pulmonary effort is normal. No respiratory distress.      Breath sounds: Normal breath sounds.   Abdominal:      Palpations: Abdomen is soft.      Tenderness: There is no abdominal tenderness.   Musculoskeletal:         General: No swelling.      Cervical back: Neck supple.      Right lower leg: No edema.      Left lower leg: No edema.   Skin:     General: Skin is warm and dry.      Capillary Refill: Capillary refill takes less than 2 seconds.   Neurological:      Mental Status: He is alert.   Psychiatric:         Mood and Affect: Mood normal.

## 2025-01-08 NOTE — ASSESSMENT & PLAN NOTE
- Followed by [facility] regarding   - Gets routine MRI of brain/orbits

## 2025-01-11 ENCOUNTER — APPOINTMENT (OUTPATIENT)
Dept: LAB | Facility: CLINIC | Age: 60
End: 2025-01-11
Payer: COMMERCIAL

## 2025-01-11 DIAGNOSIS — R53.82 CHRONIC FATIGUE: ICD-10-CM

## 2025-01-11 DIAGNOSIS — Z91.89 AT RISK FOR HEART DISEASE: ICD-10-CM

## 2025-01-11 DIAGNOSIS — Z82.49 FAMILY HISTORY OF HEART DISEASE: ICD-10-CM

## 2025-01-11 LAB
25(OH)D3 SERPL-MCNC: 46.2 NG/ML (ref 30–100)
FERRITIN SERPL-MCNC: 56 NG/ML (ref 24–336)
IRON SATN MFR SERPL: 29 % (ref 15–50)
IRON SERPL-MCNC: 128 UG/DL (ref 50–212)
TIBC SERPL-MCNC: 446.6 UG/DL (ref 250–450)
TRANSFERRIN SERPL-MCNC: 319 MG/DL (ref 203–362)
TSH SERPL DL<=0.05 MIU/L-ACNC: 2 UIU/ML (ref 0.45–4.5)
UIBC SERPL-MCNC: 319 UG/DL (ref 155–355)

## 2025-01-11 PROCEDURE — 36415 COLL VENOUS BLD VENIPUNCTURE: CPT

## 2025-01-11 PROCEDURE — 83540 ASSAY OF IRON: CPT

## 2025-01-11 PROCEDURE — 82728 ASSAY OF FERRITIN: CPT

## 2025-01-11 PROCEDURE — 83695 ASSAY OF LIPOPROTEIN(A): CPT

## 2025-01-11 PROCEDURE — 84443 ASSAY THYROID STIM HORMONE: CPT

## 2025-01-11 PROCEDURE — 82306 VITAMIN D 25 HYDROXY: CPT

## 2025-01-11 PROCEDURE — 84270 ASSAY OF SEX HORMONE GLOBUL: CPT

## 2025-01-11 PROCEDURE — 83550 IRON BINDING TEST: CPT

## 2025-01-12 ENCOUNTER — APPOINTMENT (OUTPATIENT)
Dept: LAB | Facility: CLINIC | Age: 60
End: 2025-01-12
Payer: COMMERCIAL

## 2025-01-12 LAB — SHBG SERPL-SCNC: 52 NMOL/L (ref 19.3–76.4)

## 2025-01-12 PROCEDURE — 84403 ASSAY OF TOTAL TESTOSTERONE: CPT

## 2025-01-12 PROCEDURE — 36415 COLL VENOUS BLD VENIPUNCTURE: CPT

## 2025-01-12 PROCEDURE — 84402 ASSAY OF FREE TESTOSTERONE: CPT

## 2025-01-13 LAB
LPA SERPL-SCNC: <9 NMOL/L
TESTOST FREE SERPL-MCNC: 9.6 PG/ML (ref 6.6–18.1)
TESTOST SERPL-MCNC: 553 NG/DL (ref 264–916)

## 2025-01-18 ENCOUNTER — HOSPITAL ENCOUNTER (OUTPATIENT)
Dept: RADIOLOGY | Facility: HOSPITAL | Age: 60
Discharge: HOME | End: 2025-01-18
Attending: FAMILY MEDICINE
Payer: COMMERCIAL

## 2025-01-18 ENCOUNTER — HOSPITAL ENCOUNTER (OUTPATIENT)
Dept: RADIOLOGY | Facility: HOSPITAL | Age: 60
Discharge: HOME | End: 2025-01-18
Attending: FAMILY MEDICINE

## 2025-01-18 ENCOUNTER — APPOINTMENT (OUTPATIENT)
Dept: RADIOLOGY | Facility: HOSPITAL | Age: 60
End: 2025-01-18
Attending: FAMILY MEDICINE
Payer: COMMERCIAL

## 2025-01-18 DIAGNOSIS — Z91.89 AT RISK FOR HEART DISEASE: ICD-10-CM

## 2025-01-18 DIAGNOSIS — Z82.49 FAMILY HISTORY OF HEART DISEASE: ICD-10-CM

## 2025-01-18 DIAGNOSIS — F17.211 NICOTINE DEPENDENCE, CIGARETTES, IN REMISSION: ICD-10-CM

## 2025-01-18 PROCEDURE — 75571 CT HRT W/O DYE W/CA TEST: CPT

## 2025-01-23 ENCOUNTER — HOSPITAL ENCOUNTER (OUTPATIENT)
Dept: RADIOLOGY | Facility: HOSPITAL | Age: 60
Discharge: HOME | End: 2025-01-23
Attending: FAMILY MEDICINE
Payer: COMMERCIAL

## 2025-01-23 DIAGNOSIS — M25.562 CHRONIC PAIN OF LEFT KNEE: ICD-10-CM

## 2025-01-23 DIAGNOSIS — G89.29 CHRONIC PAIN OF LEFT KNEE: ICD-10-CM

## 2025-01-23 PROCEDURE — 76882 US LMTD JT/FCL EVL NVASC XTR: CPT

## 2025-01-24 ENCOUNTER — RESULTS FOLLOW-UP (OUTPATIENT)
Dept: FAMILY MEDICINE CLINIC | Facility: CLINIC | Age: 60
End: 2025-01-24

## 2025-01-27 DIAGNOSIS — E78.2 MIXED HYPERLIPIDEMIA: ICD-10-CM

## 2025-01-27 DIAGNOSIS — I10 ESSENTIAL HYPERTENSION: ICD-10-CM

## 2025-01-27 RX ORDER — LISINOPRIL 20 MG/1
30 TABLET ORAL DAILY
Qty: 90 TABLET | Refills: 1 | Status: SHIPPED | OUTPATIENT
Start: 2025-01-27

## 2025-01-27 RX ORDER — ROSUVASTATIN CALCIUM 20 MG/1
20 TABLET, COATED ORAL DAILY
Qty: 30 TABLET | Refills: 2 | Status: SHIPPED | OUTPATIENT
Start: 2025-01-27

## 2025-03-06 DIAGNOSIS — E11.65 TYPE 2 DIABETES MELLITUS WITH HYPERGLYCEMIA, WITH LONG-TERM CURRENT USE OF INSULIN (HCC): ICD-10-CM

## 2025-03-06 DIAGNOSIS — Z79.4 TYPE 2 DIABETES MELLITUS WITH HYPERGLYCEMIA, WITH LONG-TERM CURRENT USE OF INSULIN (HCC): ICD-10-CM

## 2025-03-06 RX ORDER — EMPAGLIFLOZIN 25 MG/1
25 TABLET, FILM COATED ORAL EVERY MORNING
Qty: 30 TABLET | Refills: 5 | Status: SHIPPED | OUTPATIENT
Start: 2025-03-06

## 2025-03-15 ENCOUNTER — APPOINTMENT (EMERGENCY)
Dept: RADIOLOGY | Facility: HOSPITAL | Age: 60
End: 2025-03-15
Payer: COMMERCIAL

## 2025-03-15 ENCOUNTER — HOSPITAL ENCOUNTER (EMERGENCY)
Facility: HOSPITAL | Age: 60
Discharge: HOME | End: 2025-03-15
Attending: EMERGENCY MEDICINE
Payer: COMMERCIAL

## 2025-03-15 ENCOUNTER — APPOINTMENT (OUTPATIENT)
Dept: LAB | Facility: CLINIC | Age: 60
End: 2025-03-15
Payer: COMMERCIAL

## 2025-03-15 VITALS
RESPIRATION RATE: 20 BRPM | OXYGEN SATURATION: 98 % | SYSTOLIC BLOOD PRESSURE: 153 MMHG | HEART RATE: 82 BPM | DIASTOLIC BLOOD PRESSURE: 89 MMHG | TEMPERATURE: 98.6 F

## 2025-03-15 DIAGNOSIS — G51.0 LEFT-SIDED BELL'S PALSY: ICD-10-CM

## 2025-03-15 DIAGNOSIS — E11.65 TYPE 2 DIABETES MELLITUS WITH HYPERGLYCEMIA, WITH LONG-TERM CURRENT USE OF INSULIN (HCC): ICD-10-CM

## 2025-03-15 DIAGNOSIS — Z79.4 TYPE 2 DIABETES MELLITUS WITH HYPERGLYCEMIA, WITH LONG-TERM CURRENT USE OF INSULIN (HCC): ICD-10-CM

## 2025-03-15 DIAGNOSIS — R29.810 FACIAL WEAKNESS: Primary | ICD-10-CM

## 2025-03-15 LAB
ANION GAP SERPL CALCULATED.3IONS-SCNC: 11 MMOL/L (ref 4–13)
ANION GAP SERPL CALCULATED.3IONS-SCNC: 9 MMOL/L (ref 4–13)
APTT PPP: 28 SECONDS (ref 23–34)
ATRIAL RATE: 88 BPM
BUN SERPL-MCNC: 15 MG/DL (ref 5–25)
BUN SERPL-MCNC: 16 MG/DL (ref 5–25)
CALCIUM SERPL-MCNC: 8.8 MG/DL (ref 8.4–10.2)
CALCIUM SERPL-MCNC: 9.4 MG/DL (ref 8.4–10.2)
CARDIAC TROPONIN I PNL SERPL HS: 3 NG/L (ref ?–50)
CHLORIDE SERPL-SCNC: 95 MMOL/L (ref 96–108)
CHLORIDE SERPL-SCNC: 97 MMOL/L (ref 96–108)
CO2 SERPL-SCNC: 25 MMOL/L (ref 21–32)
CO2 SERPL-SCNC: 27 MMOL/L (ref 21–32)
CREAT SERPL-MCNC: 0.89 MG/DL (ref 0.6–1.3)
CREAT SERPL-MCNC: 0.95 MG/DL (ref 0.6–1.3)
ERYTHROCYTE [DISTWIDTH] IN BLOOD BY AUTOMATED COUNT: 14.7 % (ref 11.6–15.1)
EST. AVERAGE GLUCOSE BLD GHB EST-MCNC: 171 MG/DL
GFR SERPL CREATININE-BSD FRML MDRD: 86 ML/MIN/1.73SQ M
GFR SERPL CREATININE-BSD FRML MDRD: 92 ML/MIN/1.73SQ M
GLUCOSE P FAST SERPL-MCNC: 147 MG/DL (ref 65–99)
GLUCOSE SERPL-MCNC: 191 MG/DL (ref 65–140)
GLUCOSE SERPL-MCNC: 196 MG/DL (ref 65–140)
HBA1C MFR BLD: 7.6 %
HCT VFR BLD AUTO: 46.7 % (ref 36.5–49.3)
HGB BLD-MCNC: 15.7 G/DL (ref 12–17)
INR PPP: 0.81 (ref 0.85–1.19)
MCH RBC QN AUTO: 31.5 PG (ref 26.8–34.3)
MCHC RBC AUTO-ENTMCNC: 33.6 G/DL (ref 31.4–37.4)
MCV RBC AUTO: 94 FL (ref 82–98)
P AXIS: 61 DEGREES
PLATELET # BLD AUTO: 283 THOUSANDS/UL (ref 149–390)
PMV BLD AUTO: 8.5 FL (ref 8.9–12.7)
POTASSIUM SERPL-SCNC: 4.4 MMOL/L (ref 3.5–5.3)
POTASSIUM SERPL-SCNC: 4.8 MMOL/L (ref 3.5–5.3)
PR INTERVAL: 180 MS
PROTHROMBIN TIME: 11.5 SECONDS (ref 12.3–15)
QRS AXIS: 15 DEGREES
QRSD INTERVAL: 82 MS
QT INTERVAL: 360 MS
QTC INTERVAL: 436 MS
RBC # BLD AUTO: 4.99 MILLION/UL (ref 3.88–5.62)
SODIUM SERPL-SCNC: 131 MMOL/L (ref 135–147)
SODIUM SERPL-SCNC: 133 MMOL/L (ref 135–147)
T WAVE AXIS: 38 DEGREES
VENTRICULAR RATE: 88 BPM
WBC # BLD AUTO: 8.09 THOUSAND/UL (ref 4.31–10.16)

## 2025-03-15 PROCEDURE — 85027 COMPLETE CBC AUTOMATED: CPT

## 2025-03-15 PROCEDURE — 70496 CT ANGIOGRAPHY HEAD: CPT

## 2025-03-15 PROCEDURE — 93010 ELECTROCARDIOGRAM REPORT: CPT | Performed by: INTERNAL MEDICINE

## 2025-03-15 PROCEDURE — 80048 BASIC METABOLIC PNL TOTAL CA: CPT

## 2025-03-15 PROCEDURE — 99285 EMERGENCY DEPT VISIT HI MDM: CPT | Performed by: EMERGENCY MEDICINE

## 2025-03-15 PROCEDURE — 70498 CT ANGIOGRAPHY NECK: CPT

## 2025-03-15 PROCEDURE — 83036 HEMOGLOBIN GLYCOSYLATED A1C: CPT

## 2025-03-15 PROCEDURE — 36415 COLL VENOUS BLD VENIPUNCTURE: CPT

## 2025-03-15 PROCEDURE — 82985 ASSAY OF GLYCATED PROTEIN: CPT

## 2025-03-15 PROCEDURE — 99204 OFFICE O/P NEW MOD 45 MIN: CPT | Performed by: PSYCHIATRY & NEUROLOGY

## 2025-03-15 PROCEDURE — 85730 THROMBOPLASTIN TIME PARTIAL: CPT

## 2025-03-15 PROCEDURE — 99285 EMERGENCY DEPT VISIT HI MDM: CPT

## 2025-03-15 PROCEDURE — 82948 REAGENT STRIP/BLOOD GLUCOSE: CPT

## 2025-03-15 PROCEDURE — 84484 ASSAY OF TROPONIN QUANT: CPT

## 2025-03-15 PROCEDURE — 93005 ELECTROCARDIOGRAM TRACING: CPT

## 2025-03-15 PROCEDURE — 85610 PROTHROMBIN TIME: CPT

## 2025-03-15 RX ORDER — PREDNISONE 10 MG/1
TABLET ORAL
Qty: 79 TABLET | Refills: 0 | Status: SHIPPED | OUTPATIENT
Start: 2025-03-15 | End: 2025-04-10

## 2025-03-15 RX ADMIN — IOHEXOL 85 ML: 350 INJECTION, SOLUTION INTRAVENOUS at 09:45

## 2025-03-15 NOTE — CONSULTS
Consultation - Neurology   Name: Nixon Bailey Jr. 59 y.o. 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 Stroke alert initiated in the ED. Initial /109, . NIHSS 1 (facial droop). CTH/CTA without acute intracranial findings.     Neuroimaging/work-up:  CTH:No mass effect, acute intracranial hemorrhage or evidence of recent infarction.  CTA:No evidence for high-grade stenosis, focal occlusion or vascular aneurysm of the cervical or intracranial vessels.     Recommendations:  Exam consistent with Burton Palsy; autoimmune or post infectious  D/C stroke alert  Prednisone 60mg daily x5 days followed by slow taper (10mg every few days to off)  Continue eye drops qid and qhs eye patch to left eye  Outpatient follow up with PCP and ophthalmology      Thrombolytic Decision: Patient not a candidate. Unclear time of onset outside appropriate time window. and Symptoms resolved/clearly non disabling.    Nixon Bailey Jr. will not need outpatient follow up with Neurology. He will not require outpatient neurological testing.    History of Present Illness   Hx and PE limited by: na  Patient last known well: 2000 3/14/25  Stroke alert called: 0939 3/15/25  Neurology  time of arrival: 0941  HPI: Nixon Bailey Jr. is a 59 y.o.  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 Stroke alert initiated in the ED. Initial /109, . NIHSS 1 (facial droop). CTH/CTA without acute intracranial findings.     Patient states that last evening around 8pm he noticed that he was unable to whistle as usual. This morning he noted left facial weakness with liquid spilling from the left side of his mouth when drinking, inability to close his left eye and subjective left facial numbness prompting ED evaluation. He additionally reports recent nasal congestion with post nasal drip, left ear/neck discomfort and left eye twitching/tearing.    Review of Systems  See HPI    Objective :  Temp:  [98.6 °F (37 °C)] 98.6 °F (37 °C)  HR:  [88-94] 94  BP: (149-175)/() 175/90  Resp:  [18-22] 22  SpO2:  [98 %-99 %] 99 %  O2 Device: None (Room air)    Physical Exam  Vitals reviewed.   Constitutional:       General: He is not in acute distress.  HENT:      Head: Normocephalic and atraumatic.   Eyes:      Extraocular Movements: Extraocular movements intact.   Pulmonary:      Effort: Pulmonary effort is normal.   Skin:     General: Skin is warm and dry.   Psychiatric:         Speech: Speech normal.       Neurological Exam  Mental Status  Awake, alert and oriented to person, place and time. Speech is normal. Language is fluent with no aphasia. Attention and concentration are normal.    Cranial Nerves  CN III, IV, VI: Extraocular movements intact bilaterally.  CN V:  Right: Facial sensation is normal.  Left: Facial sensation is normal  on the left.  CN VII:  Right: There is no facial weakness.  Left: There is peripheral facial weakness.  CN XI: Shoulder shrug strength is normal.  CN XII: Tongue midline without atrophy or fasciculations.  Right eye central scotoma (chronic)  Left pupil slightly larger than right, both reactive.      NIHSS:  1a.Level of Consciousness: 0 = Alert   1b. LOC Questions: 0 = Answers both correctly   1c. LOC Commands: 0 = Obeys both correctly   2. Best Gaze: 0 = Normal   3. Visual: 0 = No visual field loss   4. Facial Palsy: 1=Minor paralysis (flattened nasolabial fold, asymmetric on smiling)   5a. Motor Right Arm: 0=No drift, limb holds 90 (or 45) degrees for full 10 seconds   5b. Motor Left Arm: 0=No drift, limb holds 90 (or 45) degrees for full 10 seconds   6a. Motor Right Le=No drift, limb holds 90 (or 45) degrees for full 10 seconds   6b. Motor Left Le=No drift, limb holds 90 (or 45) degrees for full 10 seconds   7. Limb Ataxia:  0=Absent   8. Sensory: 0=Normal; no sensory loss   9. Best Language:  0=No aphasia, normal   10. Dysarthria: 0=Normal articulation   11. Extinction and Inattention (formerly Neglect): 0=No abnormality   Total Score: 1     Time NIHSS was completed: 0955    Modified Buster Score:  0 (No baseline symptoms/disability)        I personally reviewed pertinent labs and neuroimaging as noted above    VTE Prophylaxis: VTE covered by:    None       Code Status: No Order  Advance Directive and Living Will:      Power of :    POLST:      Assessment, images and plan reviewed with Dr. Jensen at bedside. Plan discussed with patient and ED resident. Please refer to attending attestation for additional recommendations

## 2025-03-15 NOTE — ASSESSMENT & PLAN NOTE
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 Stroke alert initiated in the ED. Initial /109, . NIHSS 1 (facial droop). CTH/CTA without acute intracranial findings.

## 2025-03-15 NOTE — ED ATTENDING ATTESTATION
3/15/2025  I, Michael Huerta DO, saw and evaluated the patient. I have discussed the patient with the resident/non-physician practitioner and agree with the resident's/non-physician practitioner's findings, Plan of Care, and MDM as documented in the resident's/non-physician practitioner's note, except where noted. All available labs and Radiology studies were reviewed.  I was present for key portions of any procedure(s) performed by the resident/non-physician practitioner and I was immediately available to provide assistance.       At this point I agree with the current assessment done in the Emergency Department.  I have conducted an independent evaluation of this patient a history and physical is as follows:    59-year-old man with history of sarcoidosis, IgG4 related disease, rheumatoid arthritis, infiltrative disease of V2, sees rheumatology at [facility] and receives rituximab infusion.  Presents today for evaluation of left-sided facial droop starting last night at 8 PM when he realized he was unable to whistle.  He reports some intermittent left-sided facial numbness as well.  There was some question of left upper extremity weakness however on exam there is no demonstrable weakness.  He denies change in vision and/or speech.  No other complaints at this time.  Patient does not take a blood thinner however he does take aspirin.    Stroke alert was activated upon ED provider arrival and assessment.  I accompanied patient to CT scanner where I reviewed his CT imaging real-time demonstrating no ICH and no LVO.    Patient is not a candidate for systemic thrombolytics due to being well outside of the window.  Patient is also not a candidate for retrieval as there is no LVO.    Impression: Left-sided facial droop and numbness possibly due to progressive infiltrative disease versus rheumatologic disorder versus CVA/vascular pathology plan: Stroke alert as above neuro evaluation, likely admit for stroke  pathway.      ED Course         Critical Care Time  Procedures

## 2025-03-15 NOTE — DISCHARGE INSTRUCTIONS
Please follow up with your PCP, Neurologist, Rheumatologist, Ophthalmologist, and specialists in [place] as soon as possible. You have been prescribed a prednisone taper, please take as prescribed.    Please return to the Emergency Department if you experience worsening of your current symptoms, symptoms of a stroke, severe headache, vomiting/diarrhea, or any new/other concerning symptoms.

## 2025-03-17 ENCOUNTER — RESULTS FOLLOW-UP (OUTPATIENT)
Dept: ENDOCRINOLOGY | Facility: CLINIC | Age: 60
End: 2025-03-17

## 2025-03-17 LAB — FRUCTOSAMINE SERPL-SCNC: 272 UMOL/L (ref 0–285)

## 2025-03-25 NOTE — ED PROVIDER NOTES
Time reflects when diagnosis was documented in both MDM as applicable and the Disposition within this note       Time User Action Codes Description Comment    3/15/2025  9:37 AM Conchita Rasheed [R29.810] Facial weakness     3/15/2025 10:44 AM Conchita Rasheed [G51.0] Left-sided Bell's palsy           ED Disposition       ED Disposition   Discharge    Condition   Stable    Date/Time   Sat Mar 15, 2025 10:44 AM    Comment   Nixon Bailey Jr. discharge to home/self care.                   Assessment & Plan       Medical Decision Making  See ED course for additional details.    Amount and/or Complexity of Data Reviewed  Labs: ordered. Decision-making details documented in ED Course.  Radiology: ordered. Decision-making details documented in ED Course.  ECG/medicine tests:  Decision-making details documented in ED Course.    Risk  Prescription drug management.        ED Course as of 03/24/25 2031   Sat Mar 15, 2025   0945 HPI:   Patient is a 59 y.o. male with PMHx IGG4 disease, sarcoidosis, HTN, DM, who presents to the ED for evaluation of left sided facial weakness that he noticed last night at 2000. Woke up this morning and noticed his symptoms worsened so came to the ED after getting routine lab work done across the street. He otherwise denies any complaints or concern.     0947 ROS:   All other systems reviewed and negative unless otherwise stated in HPI above.    PHYSICAL EXAM:  General: NAD, awake, alert.  Head: Normocephalic, atraumatic.  Eyes: No scleral icterus or conjunctival injection.  ENT: Atraumatic external nose and ears. No stridor. Normal phonation.   Neck: Symmetric, trachea midline.   CV: No murmurs. Peripheral pulses +2 throughout. RRR.  Lungs: Unlabored. No retractions. No tachypnea. CTA, lungs sounds equal bilateral.   Abd: Flat, nondistended. +BS, soft, nontender.  MSK: FROM in all extremities, no deformity/injury.  Skin: Warm, dry. No rashes.  Neuro: GCS 15. AAOx4, EOMI. PERRL. No  pronator drift.  strength 5/5 bilaterally. B/L UE strength 5/5 throughout. Finger to nose, heel to shin, and cerebellar function normal. Ambulates without difficulty. No ataxia. B/L LE strength 5/5 throughout. Gross sensation to face, b/l upper and lower extremities intact. +left sided facial droop with forehead sparing.     0949 ASSESSMENT: Patient is a 59 y.o. male who presents with left sided facial droop.   DDX includes but not limited to: CVA, TIA, Bell's palsy, progression of disease (sarcoid/IGG4 disease).   PLAN: Stroke Alert called. Orders placed. Neurology consulted.     0955 ECG 12 lead  Sinus rhythm at 88 bpm, normal axis, normal, no acute ST elevations or depressions, similar prior 11 August 2009   1007 POC Glucose(!): 191  WNL   1007 Blood Pressure: 149/92   1007 CBC and Platelet(!)  Unremarkable   1007 CTA stroke alert (head/neck)  No evidence for high-grade stenosis, focal occlusion or vascular aneurysm of the cervical or intracranial vessels.   1007 CT stroke alert brain  No mass effect, acute intracranial hemorrhage or evidence of recent infarction.   1044 Case discussed with neurology who recommends prednisone taper and outpatient follow up. Discussed evaluation with findings and plan with patient. Advised on need for outpatient follow up. Given return precautions verbally and in discharge instructions, confirmed with teach back method. All questions answered prior to discharge with verbal understanding/agreement with plan expressed.       Medications   iohexol (OMNIPAQUE) 350 MG/ML injection (MULTI-DOSE) 85 mL (85 mL Intravenous Given 3/15/25 0945)       ED Risk Strat Scores                            SBIRT 22yo+      Flowsheet Row Most Recent Value   Initial Alcohol Screen: US AUDIT-C     1. How often do you have a drink containing alcohol? 0 Filed at: 03/15/2025 0854   2. How many drinks containing alcohol do you have on a typical day you are drinking?  0 Filed at: 03/15/2025 0854   3a.  Male UNDER 65: How often do you have five or more drinks on one occasion? 0 Filed at: 03/15/2025 0854   3b. FEMALE Any Age, or MALE 65+: How often do you have 4 or more drinks on one occassion? 0 Filed at: 03/15/2025 0854   Audit-C Score 0 Filed at: 03/15/2025 0854   ANJALI: How many times in the past year have you...    Used an illegal drug or used a prescription medication for non-medical reasons? Never Filed at: 03/15/2025 0854                            History of Present Illness       Chief Complaint   Patient presents with    Facial Numbness     Patient coming in for facial numbness around left side of mouth that started last night. Patient does have history of autoimmune disorder that affects his facial nerves. Neuro exam face symmetrical, extremities strength equal on all 4, only symptoms is facial numbness around mouth.        Past Medical History:   Diagnosis Date    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HCC)     Colon polyp     Diabetes mellitus (HCC)     blood sugar 121 @ 0600    GERD (gastroesophageal reflux disease)     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HCC)     Sarcoid       Past Surgical History:   Procedure Laterality Date    COLONOSCOPY      ESOPHAGOGASTRODUODENOSCOPY N/A 4/2/2019    Procedure: ESOPHAGOGASTRODUODENOSCOPY (EGD);  Surgeon: Trey Singleton MD;  Location: [facility] GI [facility];  Service: Gastroenterology    LYMPH NODE BIOPSY      LYMPH NODE DISSECTION  2009, left axilla    benign on pathology    OPTIC NERVE DECOMPRESSION      LA ESOPHAGOGASTRODUODENOSCOPY TRANSORAL DIAGNOSTIC N/A 5/26/2017    Procedure: EGD AND COLONOSCOPY;  Surgeon: Trey Singleton MD;  Location:  GI LAB;  Service: Gastroenterology    SINUS SURGERY      SINUS SURGERY      WISDOM TOOTH EXTRACTION        Family History   Problem Relation Age of Onset    Alzheimer's disease Mother     Hypertension Mother     Osteoporosis Mother     Hypertension Father     Heart attack Father     Osteoporosis Sister     Diabetes Brother     Diabetes type II Brother      Hyperthyroidism Brother     Diabetes type II Paternal Aunt       Social History     Tobacco Use    Smoking status: Former     Current packs/day: 0.00     Average packs/day: 1 pack/day for 25.0 years (25.0 ttl pk-yrs)     Types: Cigarettes     Start date: 1991     Quit date: 2016     Years since quittin.9    Smokeless tobacco: Never   Vaping Use    Vaping status: Never Used   Substance Use Topics    Alcohol use: Not Currently     Comment: rarely    Drug use: Never      E-Cigarette/Vaping    E-Cigarette Use Never User       E-Cigarette/Vaping Substances    Nicotine No     THC No     CBD No     Flavoring No     Other No     Unknown No       I have reviewed and agree with the history as documented.     Objective       ED Triage Vitals [03/15/25 0852]   Temperature Pulse Blood Pressure Respirations SpO2 Patient Position - Orthostatic VS   98.6 °F (37 °C) 93 (!) 165/109 18 98 % Sitting      Temp Source Heart Rate Source BP Location FiO2 (%) Pain Score    Oral Monitor Left arm -- 1      Vitals      Date and Time Temp Pulse SpO2 Resp BP Pain Score FACES Pain Rating User   03/15/25 1015 -- 82 98 % 20 153/89 -- -- AM   03/15/25 1013 -- 96 99 % 31 -- -- -- AM   03/15/25 1000 -- 94 99 % 22 175/90 -- -- CHASE   03/15/25 0945 -- 88 98 % 18 149/92 -- -- TLM   03/15/25 0852 98.6 °F (37 °C) 93 98 % 18 165/109 1 -- DJS              Results Reviewed       Procedure Component Value Units Date/Time    Basic metabolic panel [[id number]]  (Abnormal) Collected: 03/15/25 0948    Lab Status: Final result Specimen: Blood from Arm, Right Updated: 03/15/25 1038     Sodium 131 mmol/L      Potassium 4.4 mmol/L      Chloride 97 mmol/L      CO2 25 mmol/L      ANION GAP 9 mmol/L      BUN 15 mg/dL      Creatinine 0.89 mg/dL      Glucose 196 mg/dL      Calcium 8.8 mg/dL      eGFR 92 ml/min/1.73sq m     Narrative:      National Kid[employer] Disease Foundation guidelines for Chronic Kidney Disease (CKD):     Stage 1 with normal or high GFR (GFR > 90  mL/min/1.73 square meters)    Stage 2 Mild CKD (GFR = 60-89 mL/min/1.73 square meters)    Stage 3A Moderate CKD (GFR = 45-59 mL/min/1.73 square meters)    Stage 3B Moderate CKD (GFR = 30-44 mL/min/1.73 square meters)    Stage 4 Severe CKD (GFR = 15-29 mL/min/1.73 square meters)    Stage 5 End Stage CKD (GFR <15 mL/min/1.73 square meters)  Note: GFR calculation is accurate only with a steady state creatinine    HS Troponin 0hr (reflex protocol) [[id number]]  (Normal) Collected: 03/15/25 0948    Lab Status: Final result Specimen: Blood from Arm, Right Updated: 03/15/25 1029     hs TnI 0hr 3 ng/L     Protime-INR [[id number]]  (Abnormal) Collected: 03/15/25 0948    Lab Status: Final result Specimen: Blood from Arm, Right Updated: 03/15/25 1017     Protime 11.5 seconds      INR 0.81    Narrative:      INR Therapeutic Range    Indication                                             INR Range      Atrial Fibrillation                                               2.0-3.0  Hypercoagulable State                                    2.0.2.3  Left Ventricular Asist Device                            2.0-3.0  Mechanical Heart Valve                                  -    Aortic(with afib, MI, embolism, HF, LA enlargement,    and/or coagulopathy)                                     2.0-3.0 (2.5-3.5)     Mitral                                                             2.5-3.5  Prosthetic/Bioprosthetic Heart Valve               2.0-3.0  Venous thromboembolism (VTE: VT, PE        2.0-3.0    APTT [[id number]]  (Normal) Collected: 03/15/25 0948    Lab Status: Final result Specimen: Blood from Arm, Right Updated: 03/15/25 1017     PTT 28 seconds     CBC and Platelet [[id number]]  (Abnormal) Collected: 03/15/25 0948    Lab Status: Final result Specimen: Blood from Arm, Right Updated: 03/15/25 0957     WBC 8.09 Thousand/uL      RBC 4.99 Million/uL      Hemoglobin 15.7 g/dL      Hematocrit 46.7 %      MCV 94 fL      MCH 31.5 pg      MCHC 33.6  g/dL      RDW 14.7 %      Platelets 283 Thousands/uL      MPV 8.5 fL     Fingerstick Glucose (POCT) [[id number]]  (Abnormal) Collected: 03/15/25 0945    Lab Status: Final result Specimen: Blood Updated: 03/15/25 0946     POC Glucose 191 mg/dl             CTA stroke alert (head/neck)   Final Interpretation by Nahum Del Rosario MD (03/15 1006)   No evidence for high-grade stenosis, focal occlusion or vascular aneurysm of the cervical or intracranial vessels.         Findings were directly discussed with Melisa Jensen  at 10:05 a.m.      Workstation performed: [id number]         CT stroke alert brain   Final Interpretation by Nahum Del Rosario MD (03/15 1006)      No mass effect, acute intracranial hemorrhage or evidence of recent infarction.      Findings were directly discussed with Melisa Jensen  at approximately 10:04 a.m.      Workstation performed: [id number]             Procedures    ED Medication and Procedure Management   Prior to Admission Medications   Prescriptions Last Dose Informant Patient Reported? Taking?   Ascorbic Acid (VITAMIN C) 1000 MG tablet  Self Yes No   Sig: Take 1,000 mg by mouth daily   B-D ULTRAFINE III SHORT PEN 31G X 8 MM MISC   No No   Sig: USE 2 (TWO) TIMES A DAY   Continuous Blood Gluc Sensor (Dexcom G7 Sensor)  Self No No   Sig: Use 1 Device every 10 days   Cyanocobalamin (VITAMIN B12 PO)  Self Yes No   Sig: Take 1 tablet by mouth daily   Diclofenac Sodium (VOLTAREN) 1 %  Self No No   Sig: Apply 2 g topically 2 (two) times a day as needed (knee pain/OA)   FLUTICASONE PROPIONATE, INHAL, IN  Self Yes No   Sig: Inhale 2 sprays 2 (two) times a day   Jardiance 25 MG TABS   No No   Sig: TAKE 1 TABLET BY MOUTH EVERY DAY IN THE MORNING   Multiple Vitamins-Minerals (PRESERVISION AREDS PO)  Self Yes No   Sig: Take 1 tablet by mouth daily   NovoLOG Mix 70/30 FlexPen (70-30) 100 units/mL injection pen   No No   Sig: INJECT 9 UNITS UNDER THE SKIN JUST PRIOR TO BREAKFAST AND JUST PRIOR TO DINNER   Omega-3  Fatty Acids (FISH OIL PO)  Self Yes No   Sig: Take 1 capsule by mouth daily   OneTouch Ultra test strip   No No   Sig: Check up to 3 times daily as needed before meals and at bedtime.   Vitamin D, Cholecalciferol, 1000 units CAPS  Self Yes No   Sig: Take 1 tablet by mouth in the morning   b complex vitamins tablet  Self Yes No   Sig: Take 1 tablet by mouth daily   famotidine (PEPCID) 40 MG tablet  Self No No   Sig: Take 1 tablet (40 mg total) by mouth 2 (two) times a day   Patient not taking: Reported on 2025   folic acid (FOLVITE) 1 mg tablet  Self Yes No   Sig: Take 1 mg by mouth daily   glucose blood test strip  Self Yes No   Sig: onetouch     calvin ultra   Patient not taking: Reported on 2025   latanoprost (XALATAN) 0.005 % ophthalmic solution  Self Yes No   Si drop daily at bedtime   lisinopril (ZESTRIL) 20 mg tablet   No No   Sig: Take 1.5 tablets (30 mg total) by mouth daily   metFORMIN (GLUCOPHAGE) 1000 MG tablet   No No   Sig: TAKE 1 TABLET BY MOUTH TWICE A DAY WITH MEALS   methotrexate 2.5 mg tablet  Self Yes No   Sig: 15 mg once a week   multivitamin (THERAGRAN) TABS  Self Yes No   Sig: Take 1 tablet by mouth daily   Patient not taking: Reported on 10/30/2024   mupirocin (BACTROBAN) 2 % ointment  Self Yes No   riTUXimab (RITUXAN) injection  Self Yes No   Sig: Infuse into a venous catheter    rosuvastatin (CRESTOR) 20 MG tablet   No No   Sig: Take 1 tablet (20 mg total) by mouth daily      Facility-Administered Medications: None     Discharge Medication List as of 3/15/2025 10:50 AM        START taking these medications    Details   predniSONE 10 mg tablet Multiple Dosages:Starting Sat 3/15/2025, Until Fri 3/21/2025 at 2359, THEN Starting Sat 3/22/2025, Until Wed 3/26/2025 at 2359, THEN Starting Thu 3/27/2025, Until Mon 3/31/2025 at 2359, THEN Starting Tue 2025, Until Sat 2025 at 2359, THEN St arting Sun 2025, Until 2025 at 2359Take 6 tablets (60 mg total) by mouth daily for 7  days, THEN 4 tablets (40 mg total) daily for 5 days, THEN 2 tablets (20 mg total) daily for 5 days, THEN 1 tablet (10 mg total) daily for 5 days, THEN 0.5 t ablets (5 mg total) daily for 4 days., Normal           CONTINUE these medications which have NOT CHANGED    Details   Ascorbic Acid (VITAMIN C) 1000 MG tablet Take 1,000 mg by mouth daily, Historical Med      b complex vitamins tablet Take 1 tablet by mouth daily, Historical Med      B-D ULTRAFINE III SHORT PEN 31G X 8 MM MISC USE 2 (TWO) TIMES A DAY, Starting Mon 12/2/2024, Normal      Continuous Blood Gluc Sensor (Dexcom G7 Sensor) Use 1 Device every 10 days, Starting Wed 11/8/2023, Normal      Cyanocobalamin (VITAMIN B12 PO) Take 1 tablet by mouth daily, Historical Med      Diclofenac Sodium (VOLTAREN) 1 % Apply 2 g topically 2 (two) times a day as needed (knee pain/OA), Starting Mon 12/28/2020, Normal      famotidine (PEPCID) 40 MG tablet Take 1 tablet (40 mg total) by mouth 2 (two) times a day, Starting Tue 8/9/2022, Normal      FLUTICASONE PROPIONATE, INHAL, IN Inhale 2 sprays 2 (two) times a day, Historical Med      folic acid (FOLVITE) 1 mg tablet Take 1 mg by mouth daily, Historical Med      !! glucose blood test strip onetouch     calvin ultra, Historical Med      Jardiance 25 MG TABS TAKE 1 TABLET BY MOUTH EVERY DAY IN THE MORNING, Starting u 3/6/2025, Normal      latanoprost (XALATAN) 0.005 % ophthalmic solution 1 drop daily at bedtime, Historical Med      lisinopril (ZESTRIL) 20 mg tablet Take 1.5 tablets (30 mg total) by mouth daily, Starting Mon 1/27/2025, Normal      metFORMIN (GLUCOPHAGE) 1000 MG tablet TAKE 1 TABLET BY MOUTH TWICE A DAY WITH MEALS, Starting Mon 9/30/2024, Normal      methotrexate 2.5 mg tablet 15 mg once a week, Starting Sun 5/8/2022, Historical Med      Multiple Vitamins-Minerals (PRESERVISION AREDS PO) Take 1 tablet by mouth daily, Historical Med      multivitamin (THERAGRAN) TABS Take 1 tablet by mouth daily, Historical Med       mupirocin (BACTROBAN) 2 % ointment Historical Med      NovoLOG Mix 70/30 FlexPen (70-30) 100 units/mL injection pen INJECT 9 UNITS UNDER THE SKIN JUST PRIOR TO BREAKFAST AND JUST PRIOR TO DINNER, Normal      Omega-3 Fatty Acids (FISH OIL PO) Take 1 capsule by mouth daily, Historical Med      !! OneTouch Ultra test strip Check up to 3 times daily as needed before meals and at bedtime., Normal      riTUXimab (RITUXAN) injection Infuse into a venous catheter , Historical Med      rosuvastatin (CRESTOR) 20 MG tablet Take 1 tablet (20 mg total) by mouth daily, Starting Mon 1/27/2025, Normal      Vitamin D, Cholecalciferol, 1000 units CAPS Take 1 tablet by mouth in the morning, Historical Med       !! - Potential duplicate medications found. Please discuss with provider.        No discharge procedures on file.  ED SEPSIS DOCUMENTATION   Time reflects when diagnosis was documented in both MDM as applicable and the Disposition within this note       Time User Action Codes Description Comment    3/15/2025  9:37 AM Conchita Rasheed [R29.810] Facial weakness     3/15/2025 10:44 AM Conchita Rasheed [G51.0] Left-sided Bell's palsy                  Conchita Rasheed,   03/24/25 2031

## 2025-04-01 DIAGNOSIS — Z79.4 TYPE 2 DIABETES MELLITUS WITH HYPERGLYCEMIA, WITH LONG-TERM CURRENT USE OF INSULIN (HCC): ICD-10-CM

## 2025-04-01 DIAGNOSIS — E11.65 TYPE 2 DIABETES MELLITUS WITH HYPERGLYCEMIA, WITH LONG-TERM CURRENT USE OF INSULIN (HCC): ICD-10-CM

## 2025-04-15 ENCOUNTER — OFFICE VISIT (OUTPATIENT)
Dept: ENDOCRINOLOGY | Facility: CLINIC | Age: 60
End: 2025-04-15
Payer: COMMERCIAL

## 2025-04-15 VITALS
WEIGHT: 173.4 LBS | HEIGHT: 70 IN | DIASTOLIC BLOOD PRESSURE: 82 MMHG | BODY MASS INDEX: 24.82 KG/M2 | SYSTOLIC BLOOD PRESSURE: 130 MMHG

## 2025-04-15 DIAGNOSIS — Z79.4 TYPE 2 DIABETES MELLITUS WITH HYPERGLYCEMIA, WITH LONG-TERM CURRENT USE OF INSULIN (HCC): Primary | ICD-10-CM

## 2025-04-15 DIAGNOSIS — E11.65 TYPE 2 DIABETES MELLITUS WITH HYPERGLYCEMIA, WITH LONG-TERM CURRENT USE OF INSULIN (HCC): Primary | ICD-10-CM

## 2025-04-15 PROCEDURE — 95251 CONT GLUC MNTR ANALYSIS I&R: CPT

## 2025-04-15 PROCEDURE — 99214 OFFICE O/P EST MOD 30 MIN: CPT

## 2025-04-15 NOTE — PROGRESS NOTES
Name: Nixon Bailey Jr.      : 1965      MRN: [id number]  Encounter Provider: NAYA Stone  Encounter Date: 4/15/2025   Encounter department: [facility] [place]    Chief Complaint   Patient presents with    Diabetes Type 2     Assessment & Plan  1. Diabetes Mellitus.  His A1c level is currently at 7.5, indicating suboptimal control of his diabetes. He has been experiencing elevated blood glucose levels, particularly postprandial spikes, which may be attributed to his recent steroid use and dietary indiscretions during work travel. His urine protein creatinine ratio remains within the normal range, suggesting no significant renal involvement at this time. His glomerular filtration rate is also within normal limits, indicating adequate renal function. His creatinine levels are normal, further supporting the absence of diabetic nephropathy. His liver enzymes are within normal limits, suggesting no hepatic dysfunction related to his diabetes or its treatment. He is advised to engage in physical activity such as walking post meals, particularly lunch and dinner, to help manage his blood glucose levels. Dietary modifications, including increased water intake and reduced carbohydrate consumption, are recommended. He is advised to continue his current insulin regimen, with the possibility of increasing his NovoLog dose to 13 or 14 units based on his blood glucose readings. A foot examination will be conducted during his next visit.    2. Bell's Palsy.  He was diagnosed with Bell's palsy and was initially treated with 60 mg of steroids daily, which was tapered down and completed last Thursday.    3. Sinus Infection.  He developed a sinus infection and was prescribed antibiotics and restarted on steroids at 20 mg daily for three days.    4. Hyponatremia.  His sodium levels have been consistently low, but he has not experienced any symptoms such as confusion or  gait disturbance. He is advised to increase his salt intake and limit his fluid intake to 64 ounces per day.    5. Autoimmune Disease.  He receives a 20-25 mg dose of prednisone every six months for his autoimmune disease, which affects his blood sugar levels.    6. Hypertension  Controlled in office. Continue regimen    7. Hyperlipidemia  LDL above goal 11/2024. Update lipid panel  Continue statin. Continue lifestyle modifications    History of Present Illness  Nixon Bailey Jr. is a 58 y.o. male with a history of type 2 diabetes, hypertension, hyperlipidemia, IgG4 related disease causing recurrent/chronic pancreatitis. Complications:  none. Last A1C was 7.5. Home glucose monitoring: CGM     The patient presents for evaluation of diabetes mellitus.    He has been managing his diabetes for over 20 years. He has been experiencing frequent spikes in his blood sugar levels, which he attributes to poor dietary choices during work travel. He finds it challenging to maintain a consistent diet due to his hectic schedule. He acknowledges a lack of discipline in his food choices and expresses frustration with the need for dietary restrictions. He is considering the use of an insulin pump but is hesitant due to the significant lifestyle changes it would necessitate. 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 His current insulin regimen has been effective, but he was unable to discontinue insulin after being diagnosed with an autoimmune disease and receiving high doses of steroids for several months. He has been more active recently, engaging in outdoor activities such as fishing. He has been monitoring his blood sugar levels using Dexcom and fingerstick tests, which he finds more accurate than Mary. He has been receiving false low alarms at night from his Dexcom device, but upon checking, his blood sugar levels are normal. He calibrates his Dexcom device when it is outside the 20%  range. He is currently on NovoLog insulin, administered at a dose of 12 units with breakfast and dinner. He has been advised to increase his insulin dosage by 2 units when on steroids, which he has been doing. He has also been receiving 20 to 25 mg of prednisone every 6 months for his autoimmune disease, which causes a significant increase in his blood sugar levels.    He reports that his rheumatologist has noted a slight increase in albumin in his urine, with the last reading being 30%. He is concerned about potential kidney damage and is seeking reassurance that his liver function is not being adversely affected by his medications.    He has a history of pancreatitis, which was initially misdiagnosed as back pain. He was hospitalized for pancreatitis in 2003 and was later diagnosed with IgG4-related disease in 2016.    He reports that his sodium levels have consistently been low, despite attempts to identify the cause through various tests. He does not consume alcohol regularly, only having a beer approximately twice a month. He consumes a significant amount of decaffeinated coffee and uses a normal amount of salt in his food. He has never experienced a seizure.    He had been feeling a little funny in his face, like the left side was droopy and out of his control. He went to the ER and they did a workup and said he was not having a stroke but has Bell's palsy. They quickly put him on 60 mg of steroids a day. He was tapering down and then last week he finally ended the taper on Thursday.    He was traveling in [place] last week. He got home Friday night late from the airport and now he has a cold. On Saturday morning, he went to one of the walk-in places and was told he has a bad sinus infection. He was put on antibiotics and right back on steroids. He just tapered down from 60 mg to half a tablet up until Thursday last week and then on Saturday he was back on 20 mg. His last dose was this morning. It was only a  3-day course. Since he has some leftover, he is not going to stop dead because he was on it for a month. He is going to cut some in half for 3 or 4 days.    SOCIAL HISTORY  He drinks a lot of decaf coffee. He does not drink alcohol regularly but will have a beer once in a while, about twice a month.    MEDICATIONS  Current: NovoLog, lisinopril, Jardiance  Fructosamine is normal.    CGM Interpretation:  Date Range: April 2 to April 15, 2025  Device used: Dexcom  Option - Home use   Option - Indication: Type 2 Diabetes  Analysis of data:   Average Glucose: 158  GMI: 7.1%  Coefficient of Variation: 26.6%  SD: 42 mg/dL  Time in Target Range: 75%   Time Above Range: 22% high, 3% very high  Time Below Range: 0% low, <1% very low  Interpretation of data:   More than 72 hours of data was reviewed. Report to be scanned to chart.     Current regimen:   Jardiance 25 mg daily  Metofmrin 1,000 mg BID  Novolog 70/30 : 12 units prior to breakfast and 12 units prior to dinner- has been titrating based on blood sugars while on steroids.     ACE/ARB: lisinopril   Has hyperlipidemia: Taking rosuvastatin          Eye exam: [facility]  Foot exam: UTD, May 2024  History of pancreatitis: yes- chronic    Health Maintenance   Topic Date Due    Diabetic Eye Exam  05/16/2025    Diabetic Foot Exam  05/28/2025             Review of Systems   Constitutional:  Negative for chills and fever.   HENT:  Negative for ear pain and sore throat.    Eyes:  Negative for pain and visual disturbance.   Respiratory:  Negative for cough and shortness of breath.    Cardiovascular:  Negative for chest pain and palpitations.   Gastrointestinal:  Negative for abdominal pain and vomiting.   Genitourinary:  Negative for dysuria and hematuria.   Musculoskeletal:  Negative for arthralgias and back pain.   Skin:  Negative for color change and rash.   Neurological:  Negative for seizures and syncope.   All other systems reviewed and are negative.    "as per HPI         Medical History Reviewed by provider this encounter:     .    Objective   /82 (BP Location: Left arm, Patient Position: Sitting, Cuff Size: Adult)   Ht 5' 10\" (1.778 m)   Wt 78.7 kg (173 lb 6.4 oz)   BMI 24.88 kg/m²      Body mass index is 24.88 kg/m².  Wt Readings from Last 3 Encounters:   04/15/25 78.7 kg (173 lb 6.4 oz)   01/08/25 79.4 kg (175 lb)   12/12/24 80.6 kg (177 lb 9.6 oz)     Physical Exam  Lungs were auscultated.  Physical Exam  Vitals reviewed.   HENT:      Head: Normocephalic and atraumatic.   Pulmonary:      Effort: Pulmonary effort is normal.   Neurological:      Mental Status: He is alert.         Results  Laboratory Studies  A1c is 7.5. Urine protein creatinine ratio is 0.13. Glomerular filtration rate is normal. Creatinine is normal. Liver enzymes are all normal. Sodium levels have been consistently low.  Labs:   Lab Results   Component Value Date    HGBA1C 7.6 (H) 03/15/2025    HGBA1C 7.4 (H) 11/30/2024    HGBA1C 7.5 (A) 08/15/2024     Lab Results   Component Value Date    CREATININE 0.89 03/15/2025    CREATININE 0.95 03/15/2025    CREATININE 1.03 12/23/2024    BUN 15 03/15/2025    K 4.4 03/15/2025    CL 97 03/15/2025    CO2 25 03/15/2025     GFR, Calculated   Date Value Ref Range Status   03/16/2018 96 >60 mL/min/1.73m2 Final     Comment:     mL/min per 1.73 square meters                                            Normal Function or Mild Renal    Disease (if clinically at risk):  >or=60  Moderately Decreased:                30-59  Severely Decreased:                  15-29  Renal Failure:                         <15                                            -American GFR: multiply reported GFR by 1.16    Please note that the eGFR is based on the CKD-EPI calculation, and is not intended to be used for drug dosing.                                            Note: Calculated GFR may not be an accurate indicator of renal function if the patient's renal function " "is not in a steady state.     eGFRcr   Date Value Ref Range Status   12/23/2024 83 >=60 mL/min/1.73 m2 Final     Comment:     Estimated glomerular filtration rate (eGFR) is calculated without a race  coefficient. Values should be interpreted in the context of the patient's full  clinical presentation. Reference: Kaci Groves et al. \"A unifying approach  for GFR estimation: recommendations of the NKF-ASN task force on reassessing the  inclusion of race in diagnosing kidney disease.\" American Journal of Kidney  Diseases (2021)     eGFR   Date Value Ref Range Status   03/15/2025 92 ml/min/1.73sq m Final     Lab Results   Component Value Date    HDL 52 11/30/2024    TRIG 189 (H) 11/30/2024     Lab Results   Component Value Date    ALT 22 12/23/2024    AST 25 12/23/2024    ALKPHOS 69 12/23/2024     Lab Results   Component Value Date    MGT3EPJPBXCE 2.002 01/11/2025    YMF0KWMJUSJQ 2.210 09/24/2021    VYG6XTRONIGS 2.000 02/08/2020       There are no Patient Instructions on file for this visit.    Discussed with the patient and all questioned fully answered. He will call me if any problems arise.  "

## 2025-04-26 ENCOUNTER — PATIENT MESSAGE (OUTPATIENT)
Dept: FAMILY MEDICINE CLINIC | Facility: CLINIC | Age: 60
End: 2025-04-26

## 2025-04-26 DIAGNOSIS — E78.2 MIXED HYPERLIPIDEMIA: ICD-10-CM

## 2025-04-27 RX ORDER — ROSUVASTATIN CALCIUM 20 MG/1
20 TABLET, COATED ORAL DAILY
Qty: 30 TABLET | Refills: 5 | Status: SHIPPED | OUTPATIENT
Start: 2025-04-27

## 2025-05-25 DIAGNOSIS — Z79.4 CURRENT USE OF INSULIN (HCC): ICD-10-CM

## 2025-05-25 DIAGNOSIS — Z79.4 TYPE 2 DIABETES MELLITUS WITH HYPERGLYCEMIA, WITH LONG-TERM CURRENT USE OF INSULIN (HCC): ICD-10-CM

## 2025-05-25 DIAGNOSIS — E11.65 TYPE 2 DIABETES MELLITUS WITH HYPERGLYCEMIA, WITH LONG-TERM CURRENT USE OF INSULIN (HCC): ICD-10-CM

## 2025-05-27 ENCOUNTER — PROCEDURE VISIT (OUTPATIENT)
Dept: PODIATRY | Facility: CLINIC | Age: 60
End: 2025-05-27
Payer: COMMERCIAL

## 2025-05-27 DIAGNOSIS — M20.42 HAMMER TOES OF BOTH FEET: ICD-10-CM

## 2025-05-27 DIAGNOSIS — E11.65 TYPE 2 DIABETES MELLITUS WITH HYPERGLYCEMIA, WITH LONG-TERM CURRENT USE OF INSULIN (HCC): Primary | ICD-10-CM

## 2025-05-27 DIAGNOSIS — Z79.4 TYPE 2 DIABETES MELLITUS WITH HYPERGLYCEMIA, WITH LONG-TERM CURRENT USE OF INSULIN (HCC): Primary | ICD-10-CM

## 2025-05-27 DIAGNOSIS — M20.41 HAMMER TOES OF BOTH FEET: ICD-10-CM

## 2025-05-27 DIAGNOSIS — M21.42 PES PLANUS OF BOTH FEET: ICD-10-CM

## 2025-05-27 DIAGNOSIS — M21.41 PES PLANUS OF BOTH FEET: ICD-10-CM

## 2025-05-27 PROCEDURE — 99213 OFFICE O/P EST LOW 20 MIN: CPT | Performed by: PODIATRIST

## 2025-05-27 RX ORDER — INSULIN ASPART 100 [IU]/ML
INJECTION, SUSPENSION SUBCUTANEOUS
Qty: 15 ML | Refills: 0 | OUTPATIENT
Start: 2025-05-27

## 2025-05-27 RX ORDER — INSULIN ASPART 100 [IU]/ML
INJECTION, SUSPENSION SUBCUTANEOUS
Qty: 15 ML | Refills: 1 | Status: SHIPPED | OUTPATIENT
Start: 2025-05-27

## 2025-05-27 NOTE — PATIENT INSTRUCTIONS
"Patient Education     Foot care for people with diabetes   The Basics   Written by the doctors and editors at [facility]   Why is foot care important if I have diabetes? -- Diabetes can cause nerve damage if your blood sugar is high for a long time. The medical term for this is \"diabetic neuropathy.\"  If you have problems with the nerves in your feet, you might not be able to feel pain in your foot. Normally, people feel pain when they get a cut or a blister on their foot. The pain tells them that they need to treat their cut so it can heal. But people with nerve damage might not feel any pain when their feet get hurt. They might not even know that they have a cut, so they might not treat it. Problems that aren't treated right away can get much worse. For example, an untreated cut can get infected and turn into an open sore.  High blood sugar can also damage blood vessels and decrease blood flow to your feet. This can weaken your skin and make wounds take longer to heal. You are also more likely to get an infection if you have high blood sugar.  How do I take care of my feet? -- Taking good care of your feet can help prevent foot problems. You should:   Wash your feet every day with soap and warm water. Pat your feet dry, and be sure to dry the skin between your toes.   Keep your feet moisturized. Put lotion on the tops and bottoms of your feet, but not between your toes.   Check your feet every day (figure 1). Look for cuts, blisters, redness, or swelling. Use a mirror, or ask someone to help you check the bottoms of your feet. Check all parts of the foot, especially between the toes. Look for broken skin, ulcers, blisters, or redness.   Trim your toenails straight across when needed (figure 2). Do not cut the corners of your toenails. File rough edges. Do not cut your cuticles. Ask for help if you cannot see well or have problems reaching your feet.   Ask your doctor or nurse to check your feet at each visit. Take " your shoes and socks off for these checks.   See a foot care provider (such as a podiatrist) if you have an ingrown toenail, corn, or callus. Do not try to remove corns and calluses yourself.  How do I protect my feet from injury? -- There are several ways to protect your feet. You can:   Wear shoes and socks at all times, even at home. Do not walk barefoot. Wear swim shoes if you go to the beach or a swimming pool.   Choose shoes that fit well. They should not be not too tight or too loose. Your shoes should have plenty of room for your toes (figure 3). Your doctor might give you a prescription for special shoes. Check to see if they are covered by your insurance.   Check your shoes each time before you put them on to make sure that the lining is smooth. Also check to make sure that there is nothing inside the shoes before putting them on.   Do not wear shoes that expose any part of the foot, like sandals, thongs, or clogs.   Wear cotton socks that fit loosely. Do not wear shoes without socks.   Protect your feet from heat and cold. Test bath water before putting your feet in it to make sure that it is not too hot. Do not walk barefoot on hot ground. Take extra care when going outside in the cold and wear warm socks.  What else should I know? -- You can lower your risk for foot problems by keeping your blood sugar levels as close to your goal as possible. Other things you can do include:   Move your ankles and toes often to help with blood flow. You can wear a support stocking to help with swelling.   Walk often. Regular walking helps blood flow.   If you smoke, try to quit. Your doctor or nurse can help. Smoking causes poor blood flow to your feet and can damage your nerves.  When should I call the doctor? -- Call your doctor or nurse for advice if you have:   A fever of 100.4°F (38°C) or higher, chills, or a wound that will not heal   Swelling, redness, warmth around a wound, a foul smell coming from a wound, or  yellowish, greenish, or bloody discharge   Sores or blisters on your feet that hurt more or less than you would expect   Numbness or tingling in your foot or leg   Corns, calluses, blisters, or new sores on your foot   Very dry, scaly, or cracked skin on your feet   Changes in the way your foot joints or arch look  All topics are updated as new evidence becomes available and our peer review process is complete.  This topic retrieved from [employer] on: Mar 13, 2024.  Topic 973955 Version 2.0  Release: 32.2.4 - C32.71  © 2024 UpToDate, Inc. and/or its affiliates. All rights reserved.  figure 1: Foot check for people with diabetes     People with diabetes should check both of their feet every day. It is important to check your feet all over, including in between your toes. If you can't see the bottom of your foot, use a mirror or ask another person to check for you. Let your doctor or nurse know if you find any:  Redness   Cuts or cracks in the skin   Blisters   Swelling   Graphic 61047 Version 3.0  figure 2: Trim your toenails     Trim your toenails straight across and smooth them with a nail file.  Graphic 79937 Version 2.0  figure 3: Correct shoe shape     Choose shoes that fit the right way and are not too tight or too loose. Your shoes should have plenty of room for your toes.  Graphic 69947 Version 2.0  Consumer Information Use and Disclaimer   Disclaimer: This generalized information is a limited summary of diagnosis, treatment, and/or medication information. It is not meant to be comprehensive and should be used as a tool to help the user understand and/or assess potential diagnostic and treatment options. It does NOT include all information about conditions, treatments, medications, side effects, or risks that may apply to a specific patient. It is not intended to be medical advice or a substitute for the medical advice, diagnosis, or treatment of a health care provider based on the health care provider's  examination and assessment of a patient's specific and unique circumstances. Patients must speak with a health care provider for complete information about their health, medical questions, and treatment options, including any risks or benefits regarding use of medications. This information does not endorse any treatments or medications as safe, effective, or approved for treating a specific patient. UpToDate, Inc. and its affiliates disclaim any warranty or liability relating to this information or the use thereof.The use of this information is governed by the Terms of Use, available at https://www.woltersLolaboxuwer.com/en/know/clinical-effectiveness-terms. 2024© UpToDate, Inc. and its affiliates and/or licensors. All rights reserved.  Copyright   © 2024 UpToDate, Inc. and/or its affiliates. All rights reserved.

## 2025-05-27 NOTE — PROGRESS NOTES
PATIENT:  Nixon Bailey Jr.    1965      ASSESSMENT:     1. Type 2 diabetes mellitus with hyperglycemia, with long-term current use of insulin ([facility])        2. Hammer toes of both feet        3. Pes planus of both feet            PLAN:  1.  Reviewed medical records.  Patient was counseled on the condition and diagnosis.    2.  Educated disease prevention and risks related to diabetes.    3.  Educated proper daily foot care and exam.  Instructed proper skin care / protection and footwear.    4.  The recent blood work was reviewed and discussed.  The last HbA1c was 7.6.  Discussed proper blood glucose control with diet and exercise.    5. Gait looks normal and it could be habitual.  Instructed him to pay more attention when he walks.    6. Continue orthotics.  The patient will return in 1 year for diabetic foot exam.        Subjective:      HPI  The patient presents for diabetic foot exam.  BS is under control.  No significant numbness or paresthesia.  Denied weakness or significant functional deficit.  He notices he has been walking outside left foot.  When it does, he has more pain in left midfoot from arthritis.    The following portions of the patient's history were reviewed and updated as appropriate: allergies, current medications, past family history, past medical history, past social history, past surgical history and problem list.  All pertinent labs and images were reviewed.    Past Medical History  Past Medical History:   Diagnosis Date    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HCC)     Colon polyp     Diabetes mellitus (HCC)     blood sugar 121 @ 0600    GERD (gastroesophageal reflux disease)     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HCC)     Sarcoid        Past Surgical History  Past Surgical History:   Procedure Laterality Date    COLONOSCOPY      ESOPHAGOGASTRODUODENOSCOPY N/A 4/2/2019    Procedure: ESOPHAGOGASTRODUODENOSCOPY (EGD);  Surgeon: Trey Singleton MD;  Location: [facility];  Service: Gastroenterology    LYMPH  NODE BIOPSY      LYMPH NODE DISSECTION  2009, left axilla    benign on pathology    OPTIC NERVE DECOMPRESSION      WI ESOPHAGOGASTRODUODENOSCOPY TRANSORAL DIAGNOSTIC N/A 5/26/2017    Procedure: EGD AND COLONOSCOPY;  Surgeon: Trey Singleton MD;  Location: [facility];  Service: Gastroenterology    SINUS SURGERY      SINUS SURGERY      WISDOM TOOTH EXTRACTION          Allergies:  Cefaclor    Medications:  Current Outpatient Medications   Medication Sig Dispense Refill    b complex vitamins tablet Take 1 tablet by mouth in the morning.      B-D ULTRAFINE III SHORT PEN 31G X 8 MM MISC USE 2 (TWO) TIMES A  each 3    Continuous Blood Gluc Sensor (Dexcom G7 Sensor) Use 1 Device every 10 days 9 each 2    Cyanocobalamin (VITAMIN B12 PO) Take 1 tablet by mouth in the morning.      Diclofenac Sodium (VOLTAREN) 1 % Apply 2 g topically 2 (two) times a day as needed (knee pain/OA) 1 Tube 0    FLUTICASONE PROPIONATE, INHAL, IN Inhale 2 sprays in the morning and 2 sprays in the evening.      folic acid (FOLVITE) 1 mg tablet Take 1 mg by mouth in the morning.      Jardiance 25 MG TABS TAKE 1 TABLET BY MOUTH EVERY DAY IN THE MORNING 30 tablet 5    latanoprost (XALATAN) 0.005 % ophthalmic solution 1 drop daily at bedtime      lisinopril (ZESTRIL) 20 mg tablet Take 1.5 tablets (30 mg total) by mouth daily 90 tablet 1    metFORMIN (GLUCOPHAGE) 1000 MG tablet TAKE 1 TABLET BY MOUTH TWICE A DAY WITH MEALS 60 tablet 5    methotrexate 2.5 mg tablet 15 mg once a week      Multiple Vitamins-Minerals (PRESERVISION AREDS PO) Take 1 tablet by mouth in the morning.      mupirocin (BACTROBAN) 2 % ointment       NovoLOG Mix 70/30 FlexPen (70-30) 100 units/mL injection pen INJECT 9 UNITS UNDER THE SKIN JUST PRIOR TO BREAKFAST AND JUST PRIOR TO DINNER 15 mL 1    Omega-3 Fatty Acids (FISH OIL PO) Take 1 capsule by mouth in the morning.      OneTouch Ultra test strip Check up to 3 times daily as needed before meals and at bedtime. 300 strip 1     rosuvastatin (CRESTOR) 20 MG tablet Take 1 tablet (20 mg total) by mouth daily 30 tablet 5    Vitamin D, Cholecalciferol, 1000 units CAPS Take 1 tablet by mouth in the morning      Ascorbic Acid (VITAMIN C) 1000 MG tablet Take 1,000 mg by mouth daily      famotidine (PEPCID) 40 MG tablet Take 1 tablet (40 mg total) by mouth 2 (two) times a day (Patient not taking: Reported on 2025) 180 tablet 3    glucose blood test strip onetouch     calvin ultra (Patient not taking: Reported on 2025)      multivitamin (THERAGRAN) TABS Take 1 tablet by mouth daily (Patient not taking: Reported on 10/30/2024)      riTUXimab (RITUXAN) injection Infuse into a venous catheter  (Patient not taking: Reported on 2025)       No current facility-administered medications for this visit.       Social History:  Social History     Socioeconomic History    Marital status: /Civil Union   Occupational History    Occupation:    Tobacco Use    Smoking status: Former     Current packs/day: 0.00     Average packs/day: 1 pack/day for 25.0 years (25.0 ttl pk-yrs)     Types: Cigarettes     Start date: 1991     Quit date: 2016     Years since quittin.1    Smokeless tobacco: Never   Vaping Use    Vaping status: Never Used   Substance and Sexual Activity    Alcohol use: Not Currently     Comment: rarely    Drug use: Never    Sexual activity: Yes     Partners: Female     Birth control/protection: None     Social Drivers of Health     Financial Resource Strain: Low Risk  (2025)    Received from [facility]    Overall Financial Resource Strain (CARDIA)     Difficulty of Paying Living Expenses: Not very hard   Food Insecurity: No Food Insecurity (2025)    Received from [facility]    Hunger Vital Sign     Within the past 12 months, you worried that your food would run out before you got the money to buy more.: Never true     Within the past 12  months, the food you bought just didn't last and you didn't have money to get more.: Never true   Transportation Needs: No Transportation Needs (5/2/2025)    Received from [facility]    PRAPARE - Transportation     Lack of Transportation (Medical): No     Lack of Transportation (Non-Medical): No   Physical Activity: Insufficiently Active (5/2/2025)    Received from [facility]    Exercise Vital Sign     On average, how many days per week do you engage in moderate to strenuous exercise (like a brisk walk)?: 3 days     On average, how many minutes do you engage in exercise at this level?: 30 min   Stress: No Stress Concern Present (5/2/2025)    Received from [facility]    [employer] - Occupational Stress Questionnaire     Feeling of Stress : Only a little   Social Connections: Unknown (5/2/2025)    Received from [facility] - Social Connections     Frequency of Feeling Lonely or Isolated: Rarely   Housing Stability: Low Risk  (5/2/2025)    Received from [facility]    Housing Stability Vital Sign     In the last 12 months, was there a time when you were not able to pay the mortgage or rent on time?: No     In the past 12 months, how many times have you moved where you were living?: 0     At any time in the past 12 months, were you homeless or living in a shelter (including now)?: No        Review of Systems   Constitutional:  Negative for appetite change, chills and fever.   Respiratory: Negative.     Cardiovascular: Negative.    Gastrointestinal: Negative.    Musculoskeletal:  Negative for gait problem.   Skin:  Negative for wound.   Neurological:  Negative for weakness and numbness.         Objective:      There were no vitals taken for this visit.         Physical Exam  Vitals reviewed.   Constitutional:       General: He is not  in acute distress.     Appearance: He is not toxic-appearing or diaphoretic.     Cardiovascular:      Rate and Rhythm: Normal rate and regular rhythm.      Pulses: no weak pulses.           Dorsalis pedis pulses are 2+ on the right side and 2+ on the left side.        Posterior tibial pulses are 2+ on the right side and 2+ on the left side.   Pulmonary:      Effort: Pulmonary effort is normal. No respiratory distress.     Musculoskeletal:         General: Deformity present. No swelling, tenderness or signs of injury.      Right lower leg: No edema.      Left lower leg: No edema.      Right foot: Deformity present. No Charcot foot or foot drop.      Left foot: Deformity present. No Charcot foot or foot drop.      Comments: Mild pes planus presents.  Flexible hammertoe presents.  Mild hypertrophy of TMTJ.   Feet:      Right foot:      Protective Sensation: 10 sites tested.  10 sites sensed.      Skin integrity: No ulcer, skin breakdown, erythema, warmth, callus or dry skin.      Left foot:      Protective Sensation: 10 sites tested.  10 sites sensed.      Skin integrity: No ulcer, skin breakdown, erythema, warmth, callus or dry skin.     Skin:     General: Skin is warm.      Capillary Refill: Capillary refill takes less than 2 seconds.      Coloration: Skin is not cyanotic or mottled.      Findings: No abscess, ecchymosis, erythema, rash or wound.      Nails: There is no clubbing.     Neurological:      General: No focal deficit present.      Mental Status: He is alert and oriented to person, place, and time.      Cranial Nerves: No cranial nerve deficit.      Sensory: No sensory deficit.      Motor: No weakness.      Coordination: Coordination normal.      Deep Tendon Reflexes: Reflexes normal.      Comments: Normal gait.     Psychiatric:         Mood and Affect: Mood normal.         Behavior: Behavior normal.         Thought Content: Thought content normal.         Judgment: Judgment normal.             Diabetic  Foot Exam    Patient's shoes and socks removed.    Right Foot/Ankle   Right Foot Inspection  Skin Exam: skin normal and skin intact. No dry skin, no warmth, no callus, no erythema, no maceration, no abnormal color, no pre-ulcer, no ulcer and no callus.     Toe Exam: right toe deformity. No swelling, no tenderness and erythema    Sensory   Vibration: intact  Proprioception: intact  Monofilament testing: intact    Vascular  Capillary refills: < 3 seconds  The right DP pulse is 2+. The right PT pulse is 2+.     Left Foot/Ankle  Left Foot Inspection  Skin Exam: skin normal and skin intact. No dry skin, no warmth, no erythema, no maceration, normal color, no pre-ulcer, no ulcer and no callus.     Toe Exam: left toe deformity. No swelling, no tenderness and no erythema.     Sensory   Vibration: intact  Proprioception: intact  Monofilament testing: intact    Vascular  Capillary refills: < 3 seconds  The left DP pulse is 2+. The left PT pulse is 2+.     Assign Risk Category  Deformity present  No loss of protective sensation  No weak pulses  Risk: 0

## 2025-05-28 DIAGNOSIS — I10 ESSENTIAL HYPERTENSION: ICD-10-CM

## 2025-05-29 RX ORDER — LISINOPRIL 20 MG/1
30 TABLET ORAL DAILY
Qty: 90 TABLET | Refills: 2 | Status: SHIPPED | OUTPATIENT
Start: 2025-05-29

## 2025-06-06 DIAGNOSIS — Z79.4 TYPE 2 DIABETES MELLITUS WITH HYPERGLYCEMIA, WITH LONG-TERM CURRENT USE OF INSULIN (HCC): ICD-10-CM

## 2025-06-06 DIAGNOSIS — E11.65 TYPE 2 DIABETES MELLITUS WITH HYPERGLYCEMIA, WITH LONG-TERM CURRENT USE OF INSULIN (HCC): ICD-10-CM

## 2025-06-06 RX ORDER — BLOOD SUGAR DIAGNOSTIC
STRIP MISCELLANEOUS
Qty: 100 STRIP | Refills: 5 | Status: SHIPPED | OUTPATIENT
Start: 2025-06-06

## 2025-07-08 ENCOUNTER — OFFICE VISIT (OUTPATIENT)
Dept: FAMILY MEDICINE CLINIC | Facility: CLINIC | Age: 60
End: 2025-07-08
Payer: COMMERCIAL

## 2025-07-08 VITALS
OXYGEN SATURATION: 98 % | SYSTOLIC BLOOD PRESSURE: 130 MMHG | BODY MASS INDEX: 24.39 KG/M2 | WEIGHT: 170 LBS | RESPIRATION RATE: 18 BRPM | DIASTOLIC BLOOD PRESSURE: 84 MMHG | HEART RATE: 74 BPM | TEMPERATURE: 98 F

## 2025-07-08 DIAGNOSIS — Z00.00 ANNUAL PHYSICAL EXAM: Primary | ICD-10-CM

## 2025-07-08 DIAGNOSIS — E78.2 MIXED HYPERLIPIDEMIA: ICD-10-CM

## 2025-07-08 DIAGNOSIS — Z12.5 SCREENING FOR PROSTATE CANCER: ICD-10-CM

## 2025-07-08 DIAGNOSIS — D86.9 SARCOIDOSIS: ICD-10-CM

## 2025-07-08 DIAGNOSIS — D89.84 IGG4 RELATED DISEASE (HCC): ICD-10-CM

## 2025-07-08 DIAGNOSIS — I10 ESSENTIAL HYPERTENSION: ICD-10-CM

## 2025-07-08 DIAGNOSIS — Z79.4 TYPE 2 DIABETES MELLITUS WITH HYPERGLYCEMIA, WITH LONG-TERM CURRENT USE OF INSULIN (HCC): ICD-10-CM

## 2025-07-08 DIAGNOSIS — J32.9 CHRONIC RHINOSINUSITIS: ICD-10-CM

## 2025-07-08 DIAGNOSIS — K21.9 GASTROESOPHAGEAL REFLUX DISEASE WITHOUT ESOPHAGITIS: ICD-10-CM

## 2025-07-08 DIAGNOSIS — G47.33 OSA (OBSTRUCTIVE SLEEP APNEA): ICD-10-CM

## 2025-07-08 DIAGNOSIS — E11.65 TYPE 2 DIABETES MELLITUS WITH HYPERGLYCEMIA, WITH LONG-TERM CURRENT USE OF INSULIN (HCC): ICD-10-CM

## 2025-07-08 DIAGNOSIS — Z23 ENCOUNTER FOR IMMUNIZATION: ICD-10-CM

## 2025-07-08 PROBLEM — R93.1 AGATSTON CAC SCORE 200-399: Status: ACTIVE | Noted: 2025-07-08

## 2025-07-08 PROBLEM — R35.0 INCREASED FREQUENCY OF URINATION: Status: RESOLVED | Noted: 2025-01-08 | Resolved: 2025-07-08

## 2025-07-08 LAB — SL AMB POCT HEMOGLOBIN AIC: 7.4 (ref ?–6.5)

## 2025-07-08 PROCEDURE — 83036 HEMOGLOBIN GLYCOSYLATED A1C: CPT | Performed by: FAMILY MEDICINE

## 2025-07-08 PROCEDURE — 99396 PREV VISIT EST AGE 40-64: CPT | Performed by: FAMILY MEDICINE

## 2025-07-08 RX ORDER — MUPIROCIN 2 %
OINTMENT (GRAM) TOPICAL DAILY
Qty: 22 G | Refills: 0 | Status: SHIPPED | OUTPATIENT
Start: 2025-07-08

## 2025-07-08 NOTE — ASSESSMENT & PLAN NOTE
Lab Results   Component Value Date    HGBA1C 7.4 (A) 07/08/2025   - Followed by Endo  - BG in am around 120  - Continues on NovoLog 9 units BID AC, Jardiance 25 mg daily, metformin 1000 mg twice daily  Orders:  •  POCT hemoglobin A1c

## 2025-07-08 NOTE — PROGRESS NOTES
Adult Annual Physical  Name: Nixon Bailey Jr.      : 1965      MRN: [id number]  Encounter Provider: Efren Tillman DO  Encounter Date: 2025   Encounter department: [facility]    Assessment & Plan  Annual physical exam  -Patient presents for annual physical examination without any acute complaints or concerns at this time  - Patient denies recent change of status       Type 2 diabetes mellitus with hyperglycemia, with long-term current use of insulin ([facility])    Lab Results   Component Value Date    HGBA1C 7.4 (A) 2025   - Followed by Endo  - BG in am around 120  - Continues on NovoLog 9 units BID AC, Jardiance 25 mg daily, metformin 1000 mg twice daily  Orders:  •  POCT hemoglobin A1c    Essential hypertension  -BP of 130/84 in office today  - Continues on lisinopril 30 mg daily       DARYL (obstructive sleep apnea)  - Wearing CPAP at night; reports no issues presently  - Notes he is following up with sleep medicine regularly        Gastroesophageal reflux disease without esophagitis  - No acute complaints or concerns regarding   - Continues on Pepcid 40 mg BID        Sarcoidosis  - Continues on methotrexate and rituximab 1000mg q6mo through [facility]        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HCC)  - Followed by [facility] regarding   - Gets routine MRI of brain/orbits           Preventive Screenings:    - Prostate cancer screening: screening up-to-date     Immunizations:  - Immunizations due: Prevnar 20 and Tdap      Depression Screening and Follow-up Plan: Patient was screened for depression during today's encounter. They screened negative with a PHQ-2 score of 0.          History of Present Illness     Adult Annual Physical:  Patient presents for annual physical.     Diet and Physical Activity:  - Diet/Nutrition: well balanced diet, portion control and diabetic diet.  - Exercise: moderate cardiovascular exercise and 1-2 times a  week on average.    Depression Screening:  - PHQ-2 Score: 0    General Health:  - Sleep: 7-8 hours of sleep on average and uses CPAP machine.  - Hearing: normal hearing bilateral ears.  - Vision: vision problems, most recent eye exam < 1 year ago and wears glasses.  - Dental: regular dental visits, brushes teeth twice daily and floss regularly.    /GYN Health:  - Follows with GYN: no.   - History of STDs: no     Health:  - History of STDs: no.   - Urinary symptoms: none.     Advanced Care Planning:  - Has an advanced directive?: yes    - Has a durable medical POA?: yes      Review of Systems   Constitutional:  Positive for fatigue. Negative for activity change and appetite change.   HENT:  Negative for congestion, postnasal drip and rhinorrhea.    Respiratory:  Negative for cough, shortness of breath and wheezing.    Cardiovascular:  Negative for chest pain, palpitations and leg swelling.   Gastrointestinal:  Negative for abdominal pain, constipation and diarrhea.   Endocrine: Negative for polyuria.   Genitourinary:  Negative for decreased urine volume and difficulty urinating.   Musculoskeletal:  Positive for arthralgias (right knee on occasion). Negative for back pain and myalgias.   Skin:  Negative for color change and rash.   Allergic/Immunologic: Positive for environmental allergies.   Neurological:  Positive for headaches. Negative for dizziness and light-headedness.     Medications Ordered Prior to Encounter[1]     Objective   /84 (BP Location: Left arm, Patient Position: Sitting, Cuff Size: Standard)   Pulse 74   Temp 98 °F (36.7 °C)   Resp 18   Wt 77.1 kg (170 lb)   SpO2 98%   BMI 24.39 kg/m²     Physical Exam  Constitutional:       General: He is not in acute distress.     Appearance: Normal appearance. He is not ill-appearing.   HENT:      Head: Normocephalic and atraumatic.      Right Ear: There is impacted cerumen.      Left Ear: Tympanic membrane, ear canal and external ear normal.  There is no impacted cerumen.      Nose: Nose normal.     Cardiovascular:      Rate and Rhythm: Normal rate and regular rhythm.      Pulses: Normal pulses.      Heart sounds: Normal heart sounds.   Pulmonary:      Effort: Pulmonary effort is normal.      Breath sounds: Normal breath sounds.     Musculoskeletal:      Right lower leg: No edema.      Left lower leg: No edema.     Skin:     General: Skin is warm and dry.     Neurological:      Mental Status: He is alert.                [1]  Current Outpatient Medications on File Prior to Visit   Medication Sig Dispense Refill   • Ascorbic Acid (VITAMIN C) 1000 MG tablet Take 1,000 mg by mouth in the morning.     • b complex vitamins tablet Take 1 tablet by mouth in the morning.     • B-D ULTRAFINE III SHORT PEN 31G X 8 MM MISC USE 2 (TWO) TIMES A  each 3   • Continuous Blood Gluc Sensor (Dexcom G7 Sensor) Use 1 Device every 10 days 9 each 2   • Cyanocobalamin (VITAMIN B12 PO) Take 1 tablet by mouth in the morning.     • Diclofenac Sodium (VOLTAREN) 1 % Apply 2 g topically 2 (two) times a day as needed (knee pain/OA) 1 Tube 0   • FLUTICASONE PROPIONATE, INHAL, IN Inhale 2 sprays in the morning and 2 sprays in the evening.     • folic acid (FOLVITE) 1 mg tablet Take 1 mg by mouth in the morning.     • Jardiance 25 MG TABS TAKE 1 TABLET BY MOUTH EVERY DAY IN THE MORNING 30 tablet 5   • latanoprost (XALATAN) 0.005 % ophthalmic solution 1 drop daily at bedtime     • lisinopril (ZESTRIL) 20 mg tablet Take 1.5 tablets (30 mg total) by mouth daily 90 tablet 2   • metFORMIN (GLUCOPHAGE) 1000 MG tablet TAKE 1 TABLET BY MOUTH TWICE A DAY WITH MEALS 60 tablet 5   • methotrexate 2.5 mg tablet 15 mg once a week     • Multiple Vitamins-Minerals (PRESERVISION AREDS PO) Take 1 tablet by mouth in the morning.     • NovoLOG Mix 70/30 FlexPen (70-30) 100 units/mL injection pen INJECT 9 UNITS UNDER THE SKIN JUST PRIOR TO BREAKFAST AND JUST PRIOR TO DINNER 15 mL 1   • Omega-3 Fatty  Acids (FISH OIL PO) Take 1 capsule by mouth in the morning.     • OneTouch Ultra test strip CHECK UP TO 3 TIMES DAILY AS NEEDED BEFORE MEALS AND AT BEDTIME. 100 strip 5   • rosuvastatin (CRESTOR) 20 MG tablet Take 1 tablet (20 mg total) by mouth daily 30 tablet 5   • Vitamin D, Cholecalciferol, 1000 units CAPS Take 1 tablet by mouth in the morning     • famotidine (PEPCID) 40 MG tablet Take 1 tablet (40 mg total) by mouth 2 (two) times a day (Patient not taking: Reported on 1/8/2025) 180 tablet 3   • glucose blood test strip onetouch     calvin ultra (Patient not taking: Reported on 1/8/2025)     • multivitamin (THERAGRAN) TABS Take 1 tablet by mouth daily (Patient not taking: Reported on 10/30/2024)     • riTUXimab (RITUXAN) injection Infuse into a venous catheter  (Patient not taking: Reported on 5/27/2025)       No current facility-administered medications on file prior to visit.

## 2025-07-08 NOTE — PATIENT INSTRUCTIONS
"Patient Education     Routine physical for adults   The Basics   Written by the doctors and editors at [facility]   What is a physical? -- A physical is a routine visit, or \"check-up,\" with your doctor. You might also hear it called a \"wellness visit\" or \"preventive visit.\"  During each visit, the doctor will:   Ask about your physical and mental health   Ask about your habits, behaviors, and lifestyle   Do an exam   Give you vaccines if needed   Talk to you about any medicines you take   Give advice about your health   Answer your questions  Getting regular check-ups is an important part of taking care of your health. It can help your doctor find and treat any problems you have. But it's also important for preventing health problems.  A routine physical is different from a \"sick visit.\" A sick visit is when you see a doctor because of a health concern or problem. Since physicals are scheduled ahead of time, you can think about what you want to ask the doctor.  How often should I get a physical? -- It depends on your age and health. In general, for people age 21 years and older:   If you are younger than 50 years, you might be able to get a physical every 3 years.   If you are 50 years or older, your doctor might recommend a physical every year.  If you have an ongoing health condition, like diabetes or high blood pressure, your doctor will probably want to see you more often.  What happens during a physical? -- In general, each visit will include:   Physical exam - The doctor or nurse will check your height, weight, heart rate, and blood pressure. They will also look at your eyes and ears. They will ask about how you are feeling and whether you have any symptoms that bother you.   Medicines - It's a good idea to bring a list of all the medicines you take to each doctor visit. Your doctor will talk to you about your medicines and answer any questions. Tell them if you are having any side effects that bother you. You " "should also tell them if you are having trouble paying for any of your medicines.   Habits and behaviors - This includes:   Your diet   Your exercise habits   Whether you smoke, drink alcohol, or use drugs   Whether you are sexually active   Whether you feel safe at home  Your doctor will talk to you about things you can do to improve your health and lower your risk of health problems. They will also offer help and support. For example, if you want to quit smoking, they can give you advice and might prescribe medicines. If you want to improve your diet or get more physical activity, they can help you with this, too.   Lab tests, if needed - The tests you get will depend on your age and situation. For example, your doctor might want to check your:   Cholesterol   Blood sugar   Iron level   Vaccines - The recommended vaccines will depend on your age, health, and what vaccines you already had. Vaccines are very important because they can prevent certain serious or deadly infections.   Discussion of screening - \"Screening\" means checking for diseases or other health problems before they cause symptoms. Your doctor can recommend screening based on your age, risk, and preferences. This might include tests to check for:   Cancer, such as breast, prostate, cervical, ovarian, colorectal, prostate, lung, or skin cancer   Sexually transmitted infections, such as chlamydia and gonorrhea   Mental health conditions like depression and anxiety  Your doctor will talk to you about the different types of screening tests. They can help you decide which screenings to have. They can also explain what the results might mean.   Answering questions - The physical is a good time to ask the doctor or nurse questions about your health. If needed, they can refer you to other doctors or specialists, too.  Adults older than 65 years often need other care, too. As you get older, your doctor will talk to you about:   How to prevent falling at " home   Hearing or vision tests   Memory testing   How to take your medicines safely   Making sure that you have the help and support you need at home  All topics are updated as new evidence becomes available and our peer review process is complete.  This topic retrieved from [employer] on: May 02, 2024.  Topic 009113 Version 1.0  Release: 32.4.3 - C32.122  © 2024 UpToDate, Inc. and/or its affiliates. All rights reserved.  Consumer Information Use and Disclaimer   Disclaimer: This generalized information is a limited summary of diagnosis, treatment, and/or medication information. It is not meant to be comprehensive and should be used as a tool to help the user understand and/or assess potential diagnostic and treatment options. It does NOT include all information about conditions, treatments, medications, side effects, or risks that may apply to a specific patient. It is not intended to be medical advice or a substitute for the medical advice, diagnosis, or treatment of a health care provider based on the health care provider's examination and assessment of a patient's specific and unique circumstances. Patients must speak with a health care provider for complete information about their health, medical questions, and treatment options, including any risks or benefits regarding use of medications. This information does not endorse any treatments or medications as safe, effective, or approved for treating a specific patient. UpToDate, Inc. and its affiliates disclaim any warranty or liability relating to this information or the use thereof.The use of this information is governed by the Terms of Use, available at https://www.[employer].uwer.com/en/know/clinical-effectiveness-terms. 2024© UpToDate, Inc. and its affiliates and/or licensors. All rights reserved.  Copyright   © 2024 UpToDate, Inc. and/or its affiliates. All rights reserved.

## 2025-07-08 NOTE — ASSESSMENT & PLAN NOTE
- Wearing CPAP at night; reports no issues presently  - Notes he is following up with sleep medicine regularly

## 2025-07-08 NOTE — ASSESSMENT & PLAN NOTE
- Followed by [facility] regarding   - Gets routine MRI of brain/orbits

## 2025-07-19 ENCOUNTER — APPOINTMENT (OUTPATIENT)
Dept: LAB | Facility: CLINIC | Age: 60
End: 2025-07-19
Payer: COMMERCIAL

## 2025-07-19 DIAGNOSIS — E78.2 MIXED HYPERLIPIDEMIA: ICD-10-CM

## 2025-07-19 DIAGNOSIS — E11.65 TYPE 2 DIABETES MELLITUS WITH HYPERGLYCEMIA, WITH LONG-TERM CURRENT USE OF INSULIN (HCC): ICD-10-CM

## 2025-07-19 DIAGNOSIS — Z12.5 SCREENING FOR PROSTATE CANCER: ICD-10-CM

## 2025-07-19 DIAGNOSIS — Z79.4 TYPE 2 DIABETES MELLITUS WITH HYPERGLYCEMIA, WITH LONG-TERM CURRENT USE OF INSULIN (HCC): ICD-10-CM

## 2025-07-19 LAB
ALBUMIN SERPL BCG-MCNC: 4.6 G/DL (ref 3.5–5)
ALP SERPL-CCNC: 72 U/L (ref 34–104)
ALT SERPL W P-5'-P-CCNC: 22 U/L (ref 7–52)
ANION GAP SERPL CALCULATED.3IONS-SCNC: 9 MMOL/L (ref 4–13)
AST SERPL W P-5'-P-CCNC: 20 U/L (ref 13–39)
BILIRUB SERPL-MCNC: 0.7 MG/DL (ref 0.2–1)
BUN SERPL-MCNC: 15 MG/DL (ref 5–25)
CALCIUM SERPL-MCNC: 9.7 MG/DL (ref 8.4–10.2)
CHLORIDE SERPL-SCNC: 96 MMOL/L (ref 96–108)
CHOLEST SERPL-MCNC: 137 MG/DL (ref ?–200)
CO2 SERPL-SCNC: 29 MMOL/L (ref 21–32)
CREAT SERPL-MCNC: 0.97 MG/DL (ref 0.6–1.3)
GFR SERPL CREATININE-BSD FRML MDRD: 83 ML/MIN/1.73SQ M
GLUCOSE P FAST SERPL-MCNC: 121 MG/DL (ref 65–99)
HDLC SERPL-MCNC: 52 MG/DL
LDLC SERPL CALC-MCNC: 55 MG/DL (ref 0–100)
NONHDLC SERPL-MCNC: 85 MG/DL
POTASSIUM SERPL-SCNC: 5.1 MMOL/L (ref 3.5–5.3)
PROT SERPL-MCNC: 6.9 G/DL (ref 6.4–8.4)
PSA FREE MFR SERPL: 27.5 %
PSA FREE SERPL-MCNC: 0.29 NG/ML
PSA SERPL-MCNC: 1.06 NG/ML (ref 0–4)
SODIUM SERPL-SCNC: 134 MMOL/L (ref 135–147)
TRIGL SERPL-MCNC: 151 MG/DL (ref ?–150)

## 2025-07-19 PROCEDURE — 84153 ASSAY OF PSA TOTAL: CPT

## 2025-07-19 PROCEDURE — 84154 ASSAY OF PSA FREE: CPT

## 2025-07-19 PROCEDURE — 80061 LIPID PANEL: CPT

## 2025-07-19 PROCEDURE — 80053 COMPREHEN METABOLIC PANEL: CPT

## 2025-07-19 PROCEDURE — 82985 ASSAY OF GLYCATED PROTEIN: CPT

## 2025-07-19 PROCEDURE — 36415 COLL VENOUS BLD VENIPUNCTURE: CPT

## 2025-07-20 LAB — FRUCTOSAMINE SERPL-SCNC: 280 UMOL/L (ref 0–285)

## 2025-07-24 ENCOUNTER — PROBLEM (OUTPATIENT)
Dept: URBAN - METROPOLITAN AREA CLINIC 6 | Facility: CLINIC | Age: 60
End: 2025-07-24

## 2025-07-24 DIAGNOSIS — H57.12: ICD-10-CM

## 2025-07-24 PROCEDURE — 92012 INTRM OPH EXAM EST PATIENT: CPT

## 2025-07-24 ASSESSMENT — KERATOMETRY
OD_K1POWER_DIOPTERS: 44.75
OS_AXISANGLE_DEGREES: 086
OD_AXISANGLE2_DEGREES: 146
OS_AXISANGLE2_DEGREES: 176
OS_K2POWER_DIOPTERS: 46.25
OD_AXISANGLE_DEGREES: 056
OS_K1POWER_DIOPTERS: 46.00
OD_K2POWER_DIOPTERS: 45.50

## 2025-07-24 ASSESSMENT — VISUAL ACUITY
OS_CC: 20/50-1
OD_CC: 20/200

## 2025-07-24 ASSESSMENT — TONOMETRY
OS_IOP_MMHG: 11
OD_IOP_MMHG: 8

## 2025-08-19 ENCOUNTER — OFFICE VISIT (OUTPATIENT)
Dept: ENDOCRINOLOGY | Facility: CLINIC | Age: 60
End: 2025-08-19
Payer: COMMERCIAL

## 2025-08-19 VITALS
DIASTOLIC BLOOD PRESSURE: 92 MMHG | BODY MASS INDEX: 24.54 KG/M2 | WEIGHT: 171.4 LBS | SYSTOLIC BLOOD PRESSURE: 152 MMHG | HEART RATE: 76 BPM | OXYGEN SATURATION: 98 % | HEIGHT: 70 IN

## 2025-08-19 DIAGNOSIS — I10 ESSENTIAL HYPERTENSION: ICD-10-CM

## 2025-08-19 DIAGNOSIS — Z79.4 TYPE 2 DIABETES MELLITUS WITH HYPERGLYCEMIA, WITH LONG-TERM CURRENT USE OF INSULIN (HCC): Primary | ICD-10-CM

## 2025-08-19 DIAGNOSIS — Z79.4 CURRENT USE OF INSULIN (HCC): ICD-10-CM

## 2025-08-19 DIAGNOSIS — E11.65 TYPE 2 DIABETES MELLITUS WITH HYPERGLYCEMIA, WITH LONG-TERM CURRENT USE OF INSULIN (HCC): Primary | ICD-10-CM

## 2025-08-19 DIAGNOSIS — E78.5 DYSLIPIDEMIA: ICD-10-CM

## 2025-08-19 PROCEDURE — 95251 CONT GLUC MNTR ANALYSIS I&R: CPT | Performed by: INTERNAL MEDICINE

## 2025-08-19 PROCEDURE — 99214 OFFICE O/P EST MOD 30 MIN: CPT | Performed by: INTERNAL MEDICINE

## 2025-08-19 RX ORDER — INSULIN ASPART 100 [IU]/ML
INJECTION, SUSPENSION SUBCUTANEOUS
Qty: 15 ML | Refills: 3 | Status: SHIPPED | OUTPATIENT
Start: 2025-08-19

## (undated) RX ORDER — TOBRAMYCIN AND DEXAMETHASONE 1; 3 MG/ML; MG/ML: 1 SUSPENSION/ DROPS OPHTHALMIC

## (undated) RX ORDER — LIFITEGRAST 50 MG/ML
1 SOLUTION/ DROPS OPHTHALMIC TWICE A DAY
Start: 2024-08-09